# Patient Record
Sex: MALE | Race: WHITE | NOT HISPANIC OR LATINO | Employment: OTHER | URBAN - METROPOLITAN AREA
[De-identification: names, ages, dates, MRNs, and addresses within clinical notes are randomized per-mention and may not be internally consistent; named-entity substitution may affect disease eponyms.]

---

## 2018-12-20 ENCOUNTER — TRANSCRIBE ORDERS (OUTPATIENT)
Dept: ADMINISTRATIVE | Facility: HOSPITAL | Age: 68
End: 2018-12-20

## 2018-12-20 DIAGNOSIS — R52 PAIN: Primary | ICD-10-CM

## 2019-01-08 ENCOUNTER — HOSPITAL ENCOUNTER (OUTPATIENT)
Dept: MRI IMAGING | Facility: HOSPITAL | Age: 69
Discharge: HOME | End: 2019-01-08
Attending: FAMILY MEDICINE
Payer: MEDICARE

## 2019-01-08 DIAGNOSIS — R52 PAIN: ICD-10-CM

## 2019-01-08 PROCEDURE — 73221 MRI JOINT UPR EXTREM W/O DYE: CPT

## 2019-02-04 ENCOUNTER — OFFICE VISIT (OUTPATIENT)
Dept: OBGYN CLINIC | Facility: HOSPITAL | Age: 69
End: 2019-02-04
Payer: MEDICARE

## 2019-02-04 VITALS
WEIGHT: 271.6 LBS | DIASTOLIC BLOOD PRESSURE: 65 MMHG | HEIGHT: 69 IN | BODY MASS INDEX: 40.23 KG/M2 | SYSTOLIC BLOOD PRESSURE: 96 MMHG | HEART RATE: 60 BPM

## 2019-02-04 DIAGNOSIS — S46.811A INFRASPINATUS TENDON TEAR, RIGHT, INITIAL ENCOUNTER: ICD-10-CM

## 2019-02-04 DIAGNOSIS — IMO0001 SUPRASPINATUS TENDON TEAR, RIGHT, INITIAL ENCOUNTER: Primary | ICD-10-CM

## 2019-02-04 PROCEDURE — 99203 OFFICE O/P NEW LOW 30 MIN: CPT | Performed by: ORTHOPAEDIC SURGERY

## 2019-02-04 RX ORDER — METOPROLOL TARTRATE 100 MG/1
100 TABLET ORAL EVERY 12 HOURS SCHEDULED
COMMUNITY

## 2019-02-04 RX ORDER — ATORVASTATIN CALCIUM 80 MG/1
80 TABLET, FILM COATED ORAL
COMMUNITY

## 2019-02-04 RX ORDER — ISOSORBIDE DINITRATE 20 MG/1
20 TABLET ORAL 3 TIMES DAILY
COMMUNITY

## 2019-02-04 RX ORDER — CHLORTHALIDONE 25 MG/1
25 TABLET ORAL
COMMUNITY

## 2019-02-04 RX ORDER — CLOPIDOGREL BISULFATE 75 MG/1
75 TABLET ORAL
COMMUNITY
End: 2020-09-21

## 2019-02-04 RX ORDER — MELATONIN
2000 DAILY
COMMUNITY

## 2019-02-04 RX ORDER — LISINOPRIL 40 MG/1
40 TABLET ORAL
COMMUNITY

## 2019-02-04 RX ORDER — GABAPENTIN 400 MG/1
400 CAPSULE ORAL 3 TIMES DAILY
COMMUNITY

## 2019-02-04 RX ORDER — CEFAZOLIN SODIUM 2 G/50ML
2000 SOLUTION INTRAVENOUS ONCE
Status: CANCELLED | OUTPATIENT
Start: 2019-02-04 | End: 2019-02-04

## 2019-02-04 RX ORDER — CLONAZEPAM 0.5 MG/1
0.5 TABLET ORAL 2 TIMES DAILY
COMMUNITY

## 2019-02-04 RX ORDER — ASPIRIN 325 MG
325 TABLET ORAL
COMMUNITY

## 2019-02-04 NOTE — PATIENT INSTRUCTIONS
You are being scheduled for a shoulder arthroscopy to treat your symptoms  Below are some instructions and information on what to expect before and after your surgery  Pre-Surgical Preparation for Arthroscopic Shoulder Surgery: You will be contacted the evening prior to your surgery to confirm the scheduled time of the procedure and when to arrive at the hospital    Do not eat or drink anything after midnight the night before your surgery  Since you are having out-patient surgery, make sure that you have someone who can drive you home later in the day  Also, prepare that person for a long day, as the process of safely preparing for and recovering from the procedure is more time consuming than the actual procedure! As you will be in a sling after surgery, please wear or bring a loose fitting button-down shirt so that you can easily place this over the sling when you leave the surgical suite  This avoids having to place the operative arm in a sleeve  Most patients find that this is the easiest outfit to wear for the first week or so after surgery so you may want to plan accordingly  Most patients find that lying down in bed after shoulder surgery accentuates their discomfort  This is likely related to the effect of gravity on the swelling in the shoulder  As a result, most patients sleep better in a recliner or in bed with pillows propped up behind their back for the first few days or weeks after surgery  It is a good idea to plan for this ahead of time so there will be less hassle getting things set up the night after surgery  What to Expect After Arthroscopic Shoulder Surgery: It is normal to have swelling and discomfort in the shoulder for several days or a week after surgery  It is also normal to have a small amount of drainage from the surgical wounds (especially the first few days after surgery), as we put fluid into the shoulder to visualize the structures during surgery  It is NOT normal to have foul smelling, purulent drainage and if this is noted, please contact the office immediately or proceed to the emergency room for evaluation as this may indicate an infection  Applying ice bags to the shoulder may help with pain that is not controlled by the pain medicine  Ice should be applied 20-30 minutes at a time, every hour or two  Make sure to put a thin towel or T-shirt next to your skin to avoid direct contact of the ice with the skin  Icing is most helpful in the first 48 hours, although many people find that continuing past this time frame lessens their postoperative pain  Please note that your post-operative dressing is not conductive to ice, so if you need to, it is okay to remove that dressing even the night after surgery and place band-aids over the wounds in order for the ice to take effect  Pain Control    Most patients will receive a nerve block, the local anesthetic may keep your whole arm numb for several hours (12-24 in most cases)  When the block is beginning to wear off, you will first feel a pins and needles sensation in your hand  This is a warning that you may start experiencing more pain, so please take a pain pill if you have not already  You will be given a prescription for pain medication when you are discharged from the hospital  If you find you do not tolerate that type of pain medicine well, call our office and we will try another one  The anesthesiologists have also been providing most patients with a catheter that is left in place after the block  The catheter is connected to a small pump which will continue to provide numbing medicine and help prolong the pain control from the block  Unfortunately this catheter is not as effective as the initial block, but can still be very helpful in managing the pain  Even with the block the pain can be significant and a narcotic pain medication is often required to manage the pain    A prescription for this will be provided but only a limited number of pills will be prescribed to help manage the acute phase of recovery  Outside of the acute phase (5 or so days), this medication will not be indicated  In addition to the narcotic pain medication, it is safe to use an anti-inflammatory (unless the patient has a medical condition that would not allow safe use of this mediation)  This includes the Advil, Motrin, Ibuprofen and Alleve category of medications  Simply follow the over the counter dosing on the package and use as indicated as another adjunct  Importantly since these medications are all very similar, use only one of them  Tylenol is a separate medication that can be utilized as well and can be taken at the same time as the other medication or given in a "staggered" manner  Just make sure that you follow the dosing on the over the counter bottle instructions  Also make sure that the pain medication prescribed by Dr Olga Torres team does not contain acetaminophen (this is found in Percocet and Vicodin)  Typically we do not prescribe those types of pain medications but if for some reason that has been prescribed DO NOT add more Tylenol (acetaminophen) as you could end up taking too much of that medication  As mentioned above, most patients find that lying down accentuates their discomfort  You might sleep better in a recliner, or propped up in bed  Dr Millie Payne encourages patients to safely ambulate around the house as much as possible in the first few days after the procedure as this can help with blood circulation in the legs  While the incidence of blood clots is very rare following shoulder surgery, early ambulation is a great way to help decrease the already low rate  24-48 hours after the surgery you may remove the bandage and cover incisions with Band-Aids if needed   At that time you may shower, the wounds will have a surgical glue that will protect them from shower water but do not submerge your incisions directly (bathing or swimming) until at least 2 weeks post-operatively  Unless noted otherwise in your discharge paperwork, Dr Frieda Kerr uses absorbable sutures so they do not need to be removed  Dr Edith Pyle physician assistant (PA) will see you in the office a few days after the procedure to review the intra-operative findings and to initiate physical therapy if appropriate  A post-operative appointment should have been scheduled for you already, but if for some reason this did not happen, please call the office to make one  Physical therapy is important after nearly all shoulder surgeries and a detailed rehabilitation plan based on the specific intra-operative findings and procedures will be provided to your therapist at the first post-operative office visit  Most patients have post-operative therapy appointments scheduled pre-operatively, but if you do not, that will be handled at the first post-operative office visit  Unless expressly directed otherwise it is safe to remove the sling even the first day after the surgery and let the arm hang by the side  This allows patients to shower and even put a shirt on (bad arm in the sleeve first)  It is also safe to flex and extend their wrist, hand and fingers as much as possible when the block wears off  These simple motions can serve to pump fluid out of the forearm and decrease swelling in the arm

## 2019-02-04 NOTE — PROGRESS NOTES
Assessment  Diagnoses and all orders for this visit:    Supraspinatus tendon tear, right, initial encounter      Infraspinatus tendon tear, right, initial encounter      Discussion and Plan:    The patient has a recurrent tear of his supraspinatus and infraspinatus with some retraction but only mild atrophy  He has not improved with the injection and the physical therapy that he has been doing on his own at home after being instructed by the therapist of how to do it, given these findings I feel he is indicated for arthroscopic evaluation and revision rotator cuff repair  He understands with mild atrophy there is no guarantee that I can fully repair the recurrent tear and there is no guarantee that it will fully heal but it is his next best option at this time and we would not progress towards reverse total shoulder unless we are unsuccessful at repairing his rotator cuff  A thorough discussion was performed with the patient regarding the risks and benefit of operative and nonoperative treatment of their rotator cuff tear  Risks discussed include but were not limited to infection, neurovascular injury, recurrent tear, nonhealing of the repair, need for further surgery, need for biceps tenodesis or tenotomy, stiffness, need for prolonged rehabilitation, as well as the risk of anesthesia  After this discussion all questions were answered and informed consent was obtained in the office for arthroscopic rotator cuff repair  Subjective:   Patient ID: Eve Hansen is a 76 y o  male      Layton Copping presents for a second opinion for his right shoulder  He had an open rotator cuff repair approximately 20 years ago due to a fall at work  He states the right shoulder has been functioning well until October 2018 shortly after returning home from a fishing trip  He denies known injury or trauma  When shoulder pain and motion did not improve, he sought treatment at [facility]    MRI was obtained and was told he was not a surgical candidate  He states he did not see a surgeon but saw an advance practitioner  He was given a steroid injection which did not help his pain  He was given a home exercise program for which he has been faithfully doing since he first saw [facility] at the end of last year  Bhavana Duncan continues to be limited with shoulder range of motion and with pain  He has pain with sleeping  He is limited with activities of daily living and activities of enjoyment  He denies numbness or tingling  Vitals:    02/04/19 1320   BP: 96/65   Pulse: 60     The following portions of the patient's history were reviewed and updated as appropriate: allergies, current medications, past family history, past medical history, past social history, past surgical history and problem list     Review of Systems   Constitutional: Negative for chills and fever  HENT: Negative for hearing loss  Eyes: Negative for visual disturbance  Respiratory: Negative for shortness of breath  Cardiovascular: Negative for chest pain  Gastrointestinal: Negative for abdominal pain  Musculoskeletal:        As reviewed in the HPI   Skin: Negative for rash  Neurological:        As reviewed in the HPI   Psychiatric/Behavioral: Negative for agitation  Objective:  Left Shoulder Exam   Left shoulder exam is normal     Right Shoulder Exam     Tenderness   None    Range of Motion   Passive Abduction:                    90  Forward Flexion:                        170  External Rotation:                      30  Internal Rotation 0 degrees:      Sacrum    Muscle Strength   External Rotation: 3/5  Supraspinatus:     4/5    Tests   Impingement:   Positive  Ortez:          Positive  Drop Arm:        Positive    Comments:  Positive Empty Can  Negative Belly press  Negative Bear Hug          Physical Exam   Constitutional: He is oriented to person, place, and time  He appears well-developed and well-nourished     HENT:   Head: Normocephalic and atraumatic  Eyes: EOM are normal    Neck: Normal range of motion  Neck supple  Cardiovascular: Normal rate, regular rhythm and normal heart sounds  Pulmonary/Chest: Effort normal and breath sounds normal  No respiratory distress  He has no wheezes  Abdominal: Soft  He exhibits no distension  Musculoskeletal:        Left shoulder: Normal    Neurological: He is alert and oriented to person, place, and time  Skin: Skin is warm and dry  Psychiatric: He has a normal mood and affect  His behavior is normal  Judgment and thought content normal    Nursing note and vitals reviewed  I have personally reviewed pertinent films in PACS and my interpretation is as follows  MRI right shoulder: complete supraspinatus and infraspinatus tendon tears with mild atrophy  Long head bicep tendon tear

## 2019-02-05 ENCOUNTER — TELEPHONE (OUTPATIENT)
Dept: OBGYN CLINIC | Facility: HOSPITAL | Age: 69
End: 2019-02-05

## 2019-04-25 ENCOUNTER — CLINICAL SUPPORT (OUTPATIENT)
Dept: CARDIAC REHAB | Age: 69
End: 2019-04-25
Payer: OTHER GOVERNMENT

## 2019-04-25 DIAGNOSIS — Z95.1 S/P CABG X 3: ICD-10-CM

## 2019-04-25 PROCEDURE — 93797 PHYS/QHP OP CAR RHAB WO ECG: CPT

## 2019-04-29 ENCOUNTER — CLINICAL SUPPORT (OUTPATIENT)
Dept: CARDIAC REHAB | Age: 69
End: 2019-04-29
Payer: OTHER GOVERNMENT

## 2019-04-29 DIAGNOSIS — Z95.1 S/P CABG (CORONARY ARTERY BYPASS GRAFT): ICD-10-CM

## 2019-04-29 PROCEDURE — 93798 PHYS/QHP OP CAR RHAB W/ECG: CPT

## 2019-05-01 ENCOUNTER — CLINICAL SUPPORT (OUTPATIENT)
Dept: CARDIAC REHAB | Age: 69
End: 2019-05-01
Payer: OTHER GOVERNMENT

## 2019-05-01 DIAGNOSIS — Z95.1 S/P CABG X 3: ICD-10-CM

## 2019-05-01 PROCEDURE — 93798 PHYS/QHP OP CAR RHAB W/ECG: CPT

## 2019-05-03 ENCOUNTER — CLINICAL SUPPORT (OUTPATIENT)
Dept: CARDIAC REHAB | Age: 69
End: 2019-05-03
Payer: OTHER GOVERNMENT

## 2019-05-03 DIAGNOSIS — Z95.1 S/P CABG X 3: ICD-10-CM

## 2019-05-03 PROCEDURE — 93798 PHYS/QHP OP CAR RHAB W/ECG: CPT

## 2019-05-06 ENCOUNTER — CLINICAL SUPPORT (OUTPATIENT)
Dept: CARDIAC REHAB | Age: 69
End: 2019-05-06
Payer: OTHER GOVERNMENT

## 2019-05-06 DIAGNOSIS — Z95.1 S/P CABG X 3: ICD-10-CM

## 2019-05-06 PROCEDURE — 93798 PHYS/QHP OP CAR RHAB W/ECG: CPT

## 2019-05-08 ENCOUNTER — CLINICAL SUPPORT (OUTPATIENT)
Dept: CARDIAC REHAB | Age: 69
End: 2019-05-08
Payer: OTHER GOVERNMENT

## 2019-05-08 DIAGNOSIS — Z95.1 S/P CABG X 3: ICD-10-CM

## 2019-05-08 PROCEDURE — 93798 PHYS/QHP OP CAR RHAB W/ECG: CPT

## 2019-05-10 ENCOUNTER — CLINICAL SUPPORT (OUTPATIENT)
Dept: CARDIAC REHAB | Age: 69
End: 2019-05-10
Payer: OTHER GOVERNMENT

## 2019-05-10 DIAGNOSIS — Z95.1 S/P CABG X 3: ICD-10-CM

## 2019-05-10 PROCEDURE — 93798 PHYS/QHP OP CAR RHAB W/ECG: CPT

## 2019-05-13 ENCOUNTER — CLINICAL SUPPORT (OUTPATIENT)
Dept: CARDIAC REHAB | Age: 69
End: 2019-05-13
Payer: OTHER GOVERNMENT

## 2019-05-13 DIAGNOSIS — Z95.1 S/P CABG X 3: ICD-10-CM

## 2019-05-13 PROCEDURE — 93798 PHYS/QHP OP CAR RHAB W/ECG: CPT

## 2019-05-15 ENCOUNTER — CLINICAL SUPPORT (OUTPATIENT)
Dept: CARDIAC REHAB | Age: 69
End: 2019-05-15
Payer: OTHER GOVERNMENT

## 2019-05-15 DIAGNOSIS — Z95.1 S/P CABG X 3: ICD-10-CM

## 2019-05-15 PROCEDURE — 93798 PHYS/QHP OP CAR RHAB W/ECG: CPT

## 2019-05-17 ENCOUNTER — CLINICAL SUPPORT (OUTPATIENT)
Dept: CARDIAC REHAB | Age: 69
End: 2019-05-17
Payer: OTHER GOVERNMENT

## 2019-05-17 DIAGNOSIS — Z95.1 S/P CABG X 3: ICD-10-CM

## 2019-05-17 PROCEDURE — 93798 PHYS/QHP OP CAR RHAB W/ECG: CPT

## 2019-05-20 ENCOUNTER — CLINICAL SUPPORT (OUTPATIENT)
Dept: CARDIAC REHAB | Age: 69
End: 2019-05-20
Payer: OTHER GOVERNMENT

## 2019-05-20 DIAGNOSIS — Z95.1 S/P CABG X 3: ICD-10-CM

## 2019-05-20 PROCEDURE — 93798 PHYS/QHP OP CAR RHAB W/ECG: CPT

## 2019-05-22 ENCOUNTER — CLINICAL SUPPORT (OUTPATIENT)
Dept: CARDIAC REHAB | Age: 69
End: 2019-05-22
Payer: OTHER GOVERNMENT

## 2019-05-22 DIAGNOSIS — Z95.1 S/P CABG X 3: ICD-10-CM

## 2019-05-22 PROCEDURE — 93798 PHYS/QHP OP CAR RHAB W/ECG: CPT

## 2019-05-24 ENCOUNTER — CLINICAL SUPPORT (OUTPATIENT)
Dept: CARDIAC REHAB | Age: 69
End: 2019-05-24
Payer: OTHER GOVERNMENT

## 2019-05-24 DIAGNOSIS — Z95.1 S/P CABG X 3: ICD-10-CM

## 2019-05-24 PROCEDURE — 93798 PHYS/QHP OP CAR RHAB W/ECG: CPT

## 2019-05-28 ENCOUNTER — CLINICAL SUPPORT (OUTPATIENT)
Dept: CARDIAC REHAB | Age: 69
End: 2019-05-28
Payer: OTHER GOVERNMENT

## 2019-05-28 DIAGNOSIS — Z95.1 S/P CABG X 3: ICD-10-CM

## 2019-05-28 PROCEDURE — 93798 PHYS/QHP OP CAR RHAB W/ECG: CPT

## 2019-05-29 ENCOUNTER — CLINICAL SUPPORT (OUTPATIENT)
Dept: CARDIAC REHAB | Age: 69
End: 2019-05-29
Payer: OTHER GOVERNMENT

## 2019-05-29 DIAGNOSIS — Z95.1 S/P CABG X 3: ICD-10-CM

## 2019-05-29 PROCEDURE — 93798 PHYS/QHP OP CAR RHAB W/ECG: CPT

## 2019-05-31 ENCOUNTER — CLINICAL SUPPORT (OUTPATIENT)
Dept: CARDIAC REHAB | Age: 69
End: 2019-05-31
Payer: OTHER GOVERNMENT

## 2019-05-31 DIAGNOSIS — Z95.1 S/P CABG X 3: ICD-10-CM

## 2019-05-31 PROCEDURE — 93798 PHYS/QHP OP CAR RHAB W/ECG: CPT

## 2019-06-03 ENCOUNTER — CLINICAL SUPPORT (OUTPATIENT)
Dept: CARDIAC REHAB | Age: 69
End: 2019-06-03
Payer: OTHER GOVERNMENT

## 2019-06-03 DIAGNOSIS — Z95.1 S/P CABG X 3: ICD-10-CM

## 2019-06-03 PROCEDURE — 93798 PHYS/QHP OP CAR RHAB W/ECG: CPT

## 2019-06-05 ENCOUNTER — CLINICAL SUPPORT (OUTPATIENT)
Dept: CARDIAC REHAB | Age: 69
End: 2019-06-05
Payer: OTHER GOVERNMENT

## 2019-06-05 DIAGNOSIS — Z95.1 S/P CABG X 3: ICD-10-CM

## 2019-06-05 PROCEDURE — 93798 PHYS/QHP OP CAR RHAB W/ECG: CPT

## 2019-06-07 ENCOUNTER — CLINICAL SUPPORT (OUTPATIENT)
Dept: CARDIAC REHAB | Age: 69
End: 2019-06-07
Payer: OTHER GOVERNMENT

## 2019-06-07 DIAGNOSIS — Z95.1 S/P CABG X 3: ICD-10-CM

## 2019-06-07 PROCEDURE — 93798 PHYS/QHP OP CAR RHAB W/ECG: CPT

## 2019-06-10 ENCOUNTER — CLINICAL SUPPORT (OUTPATIENT)
Dept: CARDIAC REHAB | Age: 69
End: 2019-06-10
Payer: OTHER GOVERNMENT

## 2019-06-10 DIAGNOSIS — Z95.1 S/P CABG X 3: ICD-10-CM

## 2019-06-10 PROCEDURE — 93798 PHYS/QHP OP CAR RHAB W/ECG: CPT

## 2019-06-12 ENCOUNTER — CLINICAL SUPPORT (OUTPATIENT)
Dept: CARDIAC REHAB | Age: 69
End: 2019-06-12
Payer: OTHER GOVERNMENT

## 2019-06-12 DIAGNOSIS — Z95.1 S/P CABG X 3: ICD-10-CM

## 2019-06-12 PROCEDURE — 93798 PHYS/QHP OP CAR RHAB W/ECG: CPT

## 2019-06-14 ENCOUNTER — CLINICAL SUPPORT (OUTPATIENT)
Dept: CARDIAC REHAB | Age: 69
End: 2019-06-14
Payer: OTHER GOVERNMENT

## 2019-06-14 DIAGNOSIS — Z95.1 S/P CABG X 3: ICD-10-CM

## 2019-06-14 PROCEDURE — 93798 PHYS/QHP OP CAR RHAB W/ECG: CPT

## 2019-06-17 ENCOUNTER — CLINICAL SUPPORT (OUTPATIENT)
Dept: CARDIAC REHAB | Age: 69
End: 2019-06-17
Payer: OTHER GOVERNMENT

## 2019-06-17 DIAGNOSIS — Z95.1 S/P CABG X 3: ICD-10-CM

## 2019-06-17 PROCEDURE — 93798 PHYS/QHP OP CAR RHAB W/ECG: CPT

## 2019-06-19 ENCOUNTER — CLINICAL SUPPORT (OUTPATIENT)
Dept: CARDIAC REHAB | Age: 69
End: 2019-06-19
Payer: OTHER GOVERNMENT

## 2019-06-19 DIAGNOSIS — Z95.1 S/P CABG X 3: ICD-10-CM

## 2019-06-19 PROCEDURE — 93798 PHYS/QHP OP CAR RHAB W/ECG: CPT

## 2019-06-21 ENCOUNTER — CLINICAL SUPPORT (OUTPATIENT)
Dept: CARDIAC REHAB | Age: 69
End: 2019-06-21
Payer: OTHER GOVERNMENT

## 2019-06-21 DIAGNOSIS — Z95.1 S/P CABG X 3: ICD-10-CM

## 2019-06-21 PROCEDURE — 93798 PHYS/QHP OP CAR RHAB W/ECG: CPT

## 2019-06-24 ENCOUNTER — CLINICAL SUPPORT (OUTPATIENT)
Dept: CARDIAC REHAB | Age: 69
End: 2019-06-24
Payer: OTHER GOVERNMENT

## 2019-06-24 DIAGNOSIS — Z95.1 S/P CABG X 3: ICD-10-CM

## 2019-06-24 PROCEDURE — 93798 PHYS/QHP OP CAR RHAB W/ECG: CPT

## 2019-06-26 ENCOUNTER — APPOINTMENT (OUTPATIENT)
Dept: CARDIAC REHAB | Age: 69
End: 2019-06-26
Payer: OTHER GOVERNMENT

## 2019-06-28 ENCOUNTER — APPOINTMENT (OUTPATIENT)
Dept: CARDIAC REHAB | Age: 69
End: 2019-06-28
Payer: OTHER GOVERNMENT

## 2019-07-03 ENCOUNTER — CLINICAL SUPPORT (OUTPATIENT)
Dept: CARDIAC REHAB | Age: 69
End: 2019-07-03
Payer: MEDICARE

## 2019-07-03 DIAGNOSIS — Z95.1 S/P CABG X 3: ICD-10-CM

## 2019-07-03 PROCEDURE — 93798 PHYS/QHP OP CAR RHAB W/ECG: CPT

## 2019-07-05 ENCOUNTER — CLINICAL SUPPORT (OUTPATIENT)
Dept: CARDIAC REHAB | Age: 69
End: 2019-07-05
Payer: MEDICARE

## 2019-07-05 DIAGNOSIS — Z95.1 S/P CABG X 3: ICD-10-CM

## 2019-07-05 PROCEDURE — 93798 PHYS/QHP OP CAR RHAB W/ECG: CPT

## 2019-07-08 ENCOUNTER — CLINICAL SUPPORT (OUTPATIENT)
Dept: CARDIAC REHAB | Age: 69
End: 2019-07-08
Payer: MEDICARE

## 2019-07-08 DIAGNOSIS — Z95.1 S/P CABG X 3: ICD-10-CM

## 2019-07-08 PROCEDURE — 93798 PHYS/QHP OP CAR RHAB W/ECG: CPT

## 2019-07-10 ENCOUNTER — CLINICAL SUPPORT (OUTPATIENT)
Dept: CARDIAC REHAB | Age: 69
End: 2019-07-10
Payer: MEDICARE

## 2019-07-10 DIAGNOSIS — Z95.1 S/P CABG X 3: ICD-10-CM

## 2019-07-10 PROCEDURE — 93798 PHYS/QHP OP CAR RHAB W/ECG: CPT

## 2019-07-12 ENCOUNTER — CLINICAL SUPPORT (OUTPATIENT)
Dept: CARDIAC REHAB | Age: 69
End: 2019-07-12
Payer: MEDICARE

## 2019-07-12 DIAGNOSIS — Z95.1 S/P CABG X 3: ICD-10-CM

## 2019-07-12 PROCEDURE — 93798 PHYS/QHP OP CAR RHAB W/ECG: CPT

## 2019-07-15 ENCOUNTER — CLINICAL SUPPORT (OUTPATIENT)
Dept: CARDIAC REHAB | Age: 69
End: 2019-07-15
Payer: MEDICARE

## 2019-07-15 DIAGNOSIS — Z95.1 S/P CABG X 3: ICD-10-CM

## 2019-07-15 PROCEDURE — 93798 PHYS/QHP OP CAR RHAB W/ECG: CPT

## 2019-07-17 ENCOUNTER — CLINICAL SUPPORT (OUTPATIENT)
Dept: CARDIAC REHAB | Age: 69
End: 2019-07-17
Payer: MEDICARE

## 2019-07-17 DIAGNOSIS — Z95.1 S/P CABG X 3: ICD-10-CM

## 2019-07-17 PROCEDURE — 93798 PHYS/QHP OP CAR RHAB W/ECG: CPT

## 2019-07-19 ENCOUNTER — CLINICAL SUPPORT (OUTPATIENT)
Dept: CARDIAC REHAB | Age: 69
End: 2019-07-19
Payer: MEDICARE

## 2019-07-19 DIAGNOSIS — Z95.1 S/P CABG X 3: ICD-10-CM

## 2019-07-19 PROCEDURE — 93798 PHYS/QHP OP CAR RHAB W/ECG: CPT

## 2019-07-24 ENCOUNTER — CLINICAL SUPPORT (OUTPATIENT)
Dept: CARDIAC REHAB | Age: 69
End: 2019-07-24
Payer: MEDICARE

## 2019-07-24 DIAGNOSIS — Z95.1 S/P CABG X 3: ICD-10-CM

## 2019-07-24 PROCEDURE — 93798 PHYS/QHP OP CAR RHAB W/ECG: CPT

## 2019-07-26 ENCOUNTER — CLINICAL SUPPORT (OUTPATIENT)
Dept: CARDIAC REHAB | Age: 69
End: 2019-07-26
Payer: MEDICARE

## 2019-07-26 DIAGNOSIS — Z95.1 S/P CABG X 3: ICD-10-CM

## 2019-07-26 PROCEDURE — 93798 PHYS/QHP OP CAR RHAB W/ECG: CPT

## 2019-07-26 NOTE — PROGRESS NOTES
Alex Sharma        Dear Dr Bandar Buitrago,    Emotional well-being and depression is addressed in the Cardiac  Rehab setting  To assess the severity of depression, patients are given the PHQ-9 Depression Questionnaire  This is to inform you that your patient Galen Wetzel scored a 9 which is interpreted as 5-9 = Mild Depression  Please advise if you recommend additional mental health services or initiation of medical therapy  Thank you for your continued support of cardiac rehabilitation       Sincerely,      Abbi Shannon RN

## 2019-07-26 NOTE — PROGRESS NOTES
Cardiac Rehabilitation Plan of Care   Discharge Report      Today's date: 2019   Visits: 36  Patient name: Di Saez      : 1950  Age: 71 y o  MRN: [id number]  Referring Physician: Micki Velázquez MD  Provider: Carter Luis  Clinician: Manuelito Gr RN      Dx:   Encounter Diagnosis   Name Primary?  S/P CABG x 3      Date of onset:       SUMMARY OF PROGRESS:   Alysha Brenner completed session #36 today  During his discharge Submax ETT he reached 5 8 METs at 10 mins  NSR with occasional PVC  Denies cardiac symptoms  Today's weight was 280 lbs which is a 12 lb increase since 1 week ago  Alysha Brenner reports his furosemide was discontinued on Tuesday by Dr Shaye Maldonado at the  [facility] center was called and informed of his weight gain and they will address this with the patient  He continues with neuropathy pain during exercise, especially with walking/treadmill  Alysha Brenner has made dietary modifications such as increasing fruits and vegetables, rarely eating red meat, and whole wheat bread and pasta  His Rate Your Plate score increased by 9 4%  He reports his FBS ranges 100-121  PHQ-9 repeated today was 9 (Mild depression), which is down 1 level  Reports he doesn't feel depressed, but feels very frustrated with his health issues  He has good family support  He rides a stationery bike 20-30 mins 5 days a week and will be joining a gym         Medication compliance: Yes   Comments:   Fall Risk: Low   Comments:     EKG changes: NSR, occ PVC      EXERCISE ASSESSMENT and PLAN    Current Exercise Program in Rehab:       Frequency: 3 days/week   Home: stationery bike 5 days/week: 20-30 mins     Minutes: 40         METS: 3 8-5 3          HR:    RPE: 4-5         Modalities: Treadmill, UBE, NuStep and Recumbent bike      Exercise Progression 30 Day Goals :    Frequency: 3 days/week  Plus home walking, will be joining a gym   Minutes: 40-50   METS: 2 8-4 0   HR:     RPE: 4-5   Modalities: Treadmill, UBE, Rower, NuStep and Recumbent bike    Strength trainin-3 days / week, 12-15 repitations  and 1-2 sets per modality    Modalities: Leg Press, Chest Press and Pull Downs    Progressing: yes - progressed duration to 40 mins  Progressing intensity levels as tolerated without difficulty    Home Exercise: Type: stationery bike, Frequency: 5 days/week, Duration: 20-30 mins    Goals: 10% improvement in functional capacity, improved DASI score by 10%, Increase in peak CR METs by 40%, Resume ADLs with increased strength and Exercise 5 days/wk, >150mins/wk, increased strength  Education: Benefit of exercise for CAD risk factors, signs and sxs and RPE scale   Plan:stationery bike 5 days/week for 20-30 mins, joining gym - 3-4 days/week 40-50 mins  Readiness to change: Action      NUTRITION ASSESSMENT AND PLAN    Weight control:    Starting weight: 269 4   Current weight:   280  Waist circumference:    Startin 5   Current:  52 25  Diabetes: Patient reported fasting -120  Lipid management: Discussed diet and lipid management and Last lipid profile 19  Chol 115    HDL 27  LDL 65  Goals:reduced BMI to < 25, HDL >40, TRG <150, decreased body fat % <33%, reduced waist circumference <40 inches, improved A1c  < 6 5%, Improved Rate Your Plate score  >38 and Wt  loss 1-2 ppw goal of 250 lbs  Education: heart healthy eating  low sodium diet  diet and lipid management  diabetes management and exercise  wt  Loss   Education class: Reading Food Labels, Education Class: Heart Healthy Eating  Progressing:  Yes,  improved BG control   No- 12 lb weight gain in 1 week (diuretic dc'd)  Plan:  Goals met:   Increased PUFA and MUFA, Decrease SFA, Increase whole grains, increase fruits/vegs, eliminate processed meats, reduce red meat to rarely, swtiched to low fat dairy and Reduce added sugars <25g/day  Readiness to change: Maintenance      PSYCHOSOCIAL ASSESSMENT AND PLAN    Emotional: PHQ-9 = 9 = mild depression     Patient reports he/she is coping well with good social support and denies depression or anxiety  Self-reported stress level: 1   Social support: Excellent  Goals:  Goals met:   PHQ-9 - reduced severity by one level, Physical Fitness in [facility] Score < 3, Daily Activity in [facility] Score < 3, Pain in [facility] Score < 3, Overall Health in [facility] Score < 3, Quality of Life in [facility] Score < 3 , Change in Health in [place] Score < 3 , improved sleep, improved positive thoughts of well being and increased energy  Education: benefits of positive support system and depression and CAD  Progressing: yes - reports improved fitness, increased daily activity  Plan: Practice relaxation techniques  Readiness to change: Action      OTHER CORE COMPONENTS     Tobacco:   Social History     Tobacco Use   Smoking Status Former Smoker   Smokeless Tobacco Never Used   Tobacco Comment    quit        Tobacco Use Intervention: Referral to tobacco expert:   N/A    Blood pressure:    Restin/70, 102/56   Exercise: 132/72, 160/78    Goals: consistent BP < 130/80, reduced dietary sodium <2300mg and consistent exercise >150 mins/wk  Education:  understanding HTN and CAD and low sodium diet and HTN  Progressing: yes - consistent exercise 150 mins/wk  Plan: medication compliance, low sodium diet, consistent exercise >150 mins/week  Readiness to change: Action

## 2020-08-20 ENCOUNTER — TRANSCRIBE ORDERS (OUTPATIENT)
Dept: ADMINISTRATIVE | Facility: HOSPITAL | Age: 70
End: 2020-08-20

## 2020-08-20 DIAGNOSIS — R52 PAIN: Primary | ICD-10-CM

## 2020-09-08 ENCOUNTER — HOSPITAL ENCOUNTER (OUTPATIENT)
Dept: MRI IMAGING | Facility: HOSPITAL | Age: 70
Discharge: HOME | End: 2020-09-08
Attending: FAMILY MEDICINE
Payer: MEDICARE

## 2020-09-08 DIAGNOSIS — R52 PAIN: ICD-10-CM

## 2020-09-08 PROCEDURE — 72146 MRI CHEST SPINE W/O DYE: CPT

## 2020-09-08 PROCEDURE — G1004 CDSM NDSC: HCPCS

## 2020-09-21 ENCOUNTER — OFFICE VISIT (OUTPATIENT)
Dept: GASTROENTEROLOGY | Facility: CLINIC | Age: 70
End: 2020-09-21
Payer: MEDICARE

## 2020-09-21 VITALS
DIASTOLIC BLOOD PRESSURE: 76 MMHG | SYSTOLIC BLOOD PRESSURE: 160 MMHG | BODY MASS INDEX: 43.07 KG/M2 | HEART RATE: 61 BPM | HEIGHT: 68 IN | WEIGHT: 284.2 LBS | TEMPERATURE: 96.6 F

## 2020-09-21 DIAGNOSIS — Z12.11 SCREEN FOR COLON CANCER: Primary | ICD-10-CM

## 2020-09-21 PROCEDURE — 99203 OFFICE O/P NEW LOW 30 MIN: CPT | Performed by: PHYSICIAN ASSISTANT

## 2020-09-21 RX ORDER — LEVOTHYROXINE SODIUM 88 UG/1
CAPSULE ORAL
COMMUNITY

## 2020-09-21 RX ORDER — EZETIMIBE 10 MG/1
5 TABLET ORAL DAILY
COMMUNITY

## 2020-09-21 RX ORDER — INSULIN GLARGINE 100 [IU]/ML
INJECTION, SOLUTION SUBCUTANEOUS
COMMUNITY

## 2020-09-21 RX ORDER — PREGABALIN 100 MG/1
100 CAPSULE ORAL 3 TIMES DAILY
COMMUNITY

## 2020-09-21 NOTE — PROGRESS NOTES
[facility] - Outpatient Follow-up Note  Laura Golden 79 y o  male MRN: [id number]  Encounter: [id number]          ASSESSMENT AND PLAN:      Diagnoses and all orders for this visit:    1  Screen for colon cancer  -     Colonoscopy; Future  -     polyethylene glycol (GOLYTELY) 4000 mL solution; Take 4,000 mL by mouth once for 1 dose  2  Diabetes Mellitus Type II - A1C 6 7  3  Hypothyroidism - on Synthroid  4  Coronary Artery Disease s/p CABG x3  5  A fib - on Eliquis    Patient is referred for screening colonoscopy, with last procedure >10 years ago  He has multiple medical comorbidities which were reviewed today  He remains under the care of the cardiologist at the [place]  We will contact them to obtain clearance to hold Eliquis for his colonoscopy  Details of colonoscopy including risks, benefits, alternatives to colonoscopy all reviewed  Patient expresses understanding and agreement to colonoscopy  Will follow up as needed, based on procedure results  He will continue to follow with his PCP for routine medical care   ______________________________________________________________________    SUBJECTIVE:  Mr Yara Jaramillo is a 79year old male with hypertension, hyperlipidemia, DMII, CAD s/p CABG, A fib (on Eliquis) and peripheral neuropathy who presents to the GI office to discuss routine screening colonoscopy  He states he had 1 normal colonoscopy approximately 12 years ago  He has no GI complaints at this time, specifically no abdominal pain, nausea/vomiting, heartburn, dysphagia or odynophagia, constipation, diarrhea, melena or hematochezia  He denies a family history of colon cancer  He has DM2 which is managed with insulin and metformin  He states his most recent A1C was 6 7  He had CABG x3 approximately 2 years ago and remains in cardiac rehab  He had been on Plavix previously but is no longer taking this    He is on Eliquis for a diagnosis of A fib and follows with Dr Joanna Lara at the [facility]  He states he has gained some weight in the past 1-2 years and relates this to thyroid dysfunction  He is currently taking Synthroid daily  He denies fevers, chills, night sweats or unintentional weight loss  No cough, chest pain, palpitations, SOB, or STEWARD changes from baseline  He has some swelling in the left distal lower extremity which is chronic since his bypass surgery  REVIEW OF SYSTEMS IS OTHERWISE NEGATIVE  Historical Information   Past Medical History:   Diagnosis Date    Diabetes mellitus ([facility] [address] )     IDDM    Heart disease     Hyperlipidemia     Hypertension     [facility]    Neuropathy     Sleep apnea     NO CPAP     Past Surgical History:   Procedure Laterality Date    BACK SURGERY  2010    benign tumor removed from spine    CORONARY ANGIOPLASTY  2009    SHOULDER SURGERY Right     TONSILLECTOMY       Social History   Social History     Substance and Sexual Activity   Alcohol Use Not Currently    Comment: social     Social History     Substance and Sexual Activity   Drug Use No     Social History     Tobacco Use   Smoking Status Former Smoker   Smokeless Tobacco Never Used   Tobacco Comment    quit 1989     History reviewed  No pertinent family history      Meds/Allergies       Current Outpatient Medications:     apixaban (ELIQUIS) 5 mg    aspirin 325 mg tablet    atorvastatin (LIPITOR) 80 mg tablet    calcium-vitamin D (OSCAL) 250-125 MG-UNIT per tablet    chlorthalidone 25 mg tablet    clonazePAM (KlonoPIN) 0 5 mg tablet    cyanocobalamin 1000 MCG tablet    ezetimibe (ZETIA) 10 mg tablet    insulin aspart (NovoLOG) 100 units/mL injection    insulin glargine (Lantus) 100 units/mL subcutaneous injection    Levothyroxine Sodium 88 MCG CAPS    lisinopril (ZESTRIL) 40 mg tablet    metFORMIN (GLUCOPHAGE) 1000 MG tablet    metoprolol tartrate (LOPRESSOR) 100 mg tablet    pregabalin (LYRICA) 100 mg capsule    cholecalciferol (VITAMIN D3) 1,000 units tablet    gabapentin (NEURONTIN) 400 mg capsule    insulin detemir (LEVEMIR) 100 units/mL subcutaneous injection    insulin regular (HumuLIN R,NovoLIN R) 100 units/mL injection    isosorbide dinitrate (ISORDIL) 20 mg tablet    polyethylene glycol (GOLYTELY) 4000 mL solution    Allergies   Allergen Reactions    Amlodipine Lip Swelling           Objective     Blood pressure 160/76, pulse 61, temperature (!) 96 6 °F (35 9 °C), temperature source Tympanic, height 5' 8" (1 727 m), weight 129 kg (284 lb 3 2 oz)  Body mass index is 43 21 kg/m²  PHYSICAL EXAM:      General Appearance:   Alert, cooperative, no distress   HEENT:   Normocephalic, atraumatic, sclera anicteric   Wearing mask for examination  Neck:  Supple, symmetrical, no lymphadenopathy, trachea midline   Lungs:   Clear to auscultation bilaterally; no rales, rhonchi or wheezing; respirations unlabored    Heart[de-identified]   Regular rate and rhythm; no murmur, rub, or gallop     Abdomen:   Soft/obese, non-tender without rebound or guarding, non-distended; positive bowel sounds; no masses, no organomegaly    Genitalia:   Deferred    Rectal:   Deferred    Extremities:  No cyanosis or clubbing, trace non-pitting left lower extremity edema    Pulses:  2+ and symmetric    Skin:  No jaundice, rashes, or lesions          Erna Ramsey PA-C

## 2020-09-23 ENCOUNTER — TELEPHONE (OUTPATIENT)
Dept: GASTROENTEROLOGY | Facility: CLINIC | Age: 70
End: 2020-09-23

## 2020-09-23 NOTE — TELEPHONE ENCOUNTER
Our mutual patient is scheduled for procedure: Colonoscopy    On: 11/05/20     With: Dr Cris Uribe is taking the following blood thinner:   Eliquis       Can this be stopped ____2__ days prior to the procedure?       Physician Approving clearance: ________________________

## 2020-09-23 NOTE — TELEPHONE ENCOUNTER
Colonoscopy scheduled for 11/05/20 with Dr Grimm [facility] instructions given verbally/mailed to patient

## 2020-11-05 ENCOUNTER — ANESTHESIA (OUTPATIENT)
Dept: GASTROENTEROLOGY | Facility: HOSPITAL | Age: 70
End: 2020-11-05

## 2020-11-05 ENCOUNTER — ANESTHESIA EVENT (OUTPATIENT)
Dept: GASTROENTEROLOGY | Facility: HOSPITAL | Age: 70
End: 2020-11-05

## 2020-11-05 ENCOUNTER — HOSPITAL ENCOUNTER (OUTPATIENT)
Dept: GASTROENTEROLOGY | Facility: HOSPITAL | Age: 70
Setting detail: OUTPATIENT SURGERY
Discharge: HOME | End: 2020-11-05
Attending: INTERNAL MEDICINE
Payer: MEDICARE

## 2020-11-05 VITALS
SYSTOLIC BLOOD PRESSURE: 121 MMHG | DIASTOLIC BLOOD PRESSURE: 59 MMHG | HEART RATE: 68 BPM | OXYGEN SATURATION: 97 % | BODY MASS INDEX: 43.04 KG/M2 | WEIGHT: 284 LBS | RESPIRATION RATE: 17 BRPM | HEIGHT: 68 IN | TEMPERATURE: 97.2 F

## 2020-11-05 VITALS — HEART RATE: 66 BPM

## 2020-11-05 DIAGNOSIS — Z12.11 SCREEN FOR COLON CANCER: ICD-10-CM

## 2020-11-05 PROCEDURE — 45385 COLONOSCOPY W/LESION REMOVAL: CPT | Performed by: INTERNAL MEDICINE

## 2020-11-05 PROCEDURE — 88305 TISSUE EXAM BY PATHOLOGIST: CPT | Performed by: PATHOLOGY

## 2020-11-05 RX ORDER — EPHEDRINE SULFATE 50 MG/ML
INJECTION INTRAVENOUS AS NEEDED
Status: DISCONTINUED | OUTPATIENT
Start: 2020-11-05 | End: 2020-11-05

## 2020-11-05 RX ORDER — LIDOCAINE HYDROCHLORIDE 10 MG/ML
INJECTION, SOLUTION EPIDURAL; INFILTRATION; INTRACAUDAL; PERINEURAL AS NEEDED
Status: DISCONTINUED | OUTPATIENT
Start: 2020-11-05 | End: 2020-11-05

## 2020-11-05 RX ORDER — SODIUM CHLORIDE, SODIUM LACTATE, POTASSIUM CHLORIDE, CALCIUM CHLORIDE 600; 310; 30; 20 MG/100ML; MG/100ML; MG/100ML; MG/100ML
50 INJECTION, SOLUTION INTRAVENOUS CONTINUOUS
Status: CANCELLED | OUTPATIENT
Start: 2020-11-05

## 2020-11-05 RX ORDER — PROPOFOL 10 MG/ML
INJECTION, EMULSION INTRAVENOUS AS NEEDED
Status: DISCONTINUED | OUTPATIENT
Start: 2020-11-05 | End: 2020-11-05

## 2020-11-05 RX ORDER — LIDOCAINE HYDROCHLORIDE 10 MG/ML
0.5 INJECTION, SOLUTION EPIDURAL; INFILTRATION; INTRACAUDAL; PERINEURAL ONCE AS NEEDED
Status: CANCELLED | OUTPATIENT
Start: 2020-11-05

## 2020-11-05 RX ORDER — SODIUM CHLORIDE 9 MG/ML
INJECTION, SOLUTION INTRAVENOUS CONTINUOUS PRN
Status: DISCONTINUED | OUTPATIENT
Start: 2020-11-05 | End: 2020-11-05

## 2020-11-05 RX ADMIN — SODIUM CHLORIDE: 9 INJECTION, SOLUTION INTRAVENOUS at 10:56

## 2020-11-05 RX ADMIN — PROPOFOL 50 MG: 10 INJECTION, EMULSION INTRAVENOUS at 11:21

## 2020-11-05 RX ADMIN — PROPOFOL 50 MG: 10 INJECTION, EMULSION INTRAVENOUS at 11:31

## 2020-11-05 RX ADMIN — EPHEDRINE SULFATE 10 MG: 50 INJECTION, SOLUTION INTRAVENOUS at 11:25

## 2020-11-05 RX ADMIN — LIDOCAINE HYDROCHLORIDE 50 MG: 10 INJECTION, SOLUTION EPIDURAL; INFILTRATION; INTRACAUDAL; PERINEURAL at 11:11

## 2020-11-05 RX ADMIN — PROPOFOL 50 MG: 10 INJECTION, EMULSION INTRAVENOUS at 11:38

## 2020-11-05 RX ADMIN — PROPOFOL 10 MG: 10 INJECTION, EMULSION INTRAVENOUS at 11:41

## 2020-11-05 RX ADMIN — EPHEDRINE SULFATE 5 MG: 50 INJECTION, SOLUTION INTRAVENOUS at 11:21

## 2020-11-05 RX ADMIN — PROPOFOL 50 MG: 10 INJECTION, EMULSION INTRAVENOUS at 11:15

## 2020-11-05 RX ADMIN — PROPOFOL 100 MG: 10 INJECTION, EMULSION INTRAVENOUS at 11:11

## 2022-09-28 ENCOUNTER — HOSPITAL ENCOUNTER (INPATIENT)
Facility: HOSPITAL | Age: 72
LOS: 4 days | DRG: 303 | End: 2022-10-02
Attending: EMERGENCY MEDICINE | Admitting: INTERNAL MEDICINE
Payer: COMMERCIAL

## 2022-09-28 ENCOUNTER — APPOINTMENT (OUTPATIENT)
Dept: RADIOLOGY | Facility: HOSPITAL | Age: 72
DRG: 303 | End: 2022-09-28
Payer: COMMERCIAL

## 2022-09-28 DIAGNOSIS — I21.4 NSTEMI (NON-ST ELEVATED MYOCARDIAL INFARCTION) (HCC): Primary | ICD-10-CM

## 2022-09-28 DIAGNOSIS — Z95.1 S/P TRIPLE VESSEL BYPASS: ICD-10-CM

## 2022-09-28 DIAGNOSIS — R77.8 ELEVATED TROPONIN: ICD-10-CM

## 2022-09-28 DIAGNOSIS — R07.9 CHEST PAIN: ICD-10-CM

## 2022-09-28 PROBLEM — E03.9 HYPOTHYROIDISM: Status: ACTIVE | Noted: 2022-09-28

## 2022-09-28 PROBLEM — D72.829 LEUKOCYTOSIS: Status: ACTIVE | Noted: 2022-09-28

## 2022-09-28 PROBLEM — E11.9 TYPE 2 DIABETES MELLITUS (HCC): Status: ACTIVE | Noted: 2022-09-28

## 2022-09-28 LAB
2HR DELTA HS TROPONIN: 45 NG/L
4HR DELTA HS TROPONIN: 114 NG/L
ALBUMIN SERPL BCP-MCNC: 3.6 G/DL (ref 3.5–5)
ALP SERPL-CCNC: 98 U/L (ref 34–104)
ALT SERPL W P-5'-P-CCNC: 17 U/L (ref 7–52)
ANION GAP SERPL CALCULATED.3IONS-SCNC: 6 MMOL/L (ref 4–13)
APTT PPP: 44 SECONDS (ref 23–37)
AST SERPL W P-5'-P-CCNC: 16 U/L (ref 13–39)
BASOPHILS # BLD AUTO: 0.05 THOUSANDS/ΜL (ref 0–0.1)
BASOPHILS NFR BLD AUTO: 0 % (ref 0–1)
BILIRUB SERPL-MCNC: 0.93 MG/DL (ref 0.2–1)
BUN SERPL-MCNC: 23 MG/DL (ref 5–25)
CALCIUM SERPL-MCNC: 9.6 MG/DL (ref 8.4–10.2)
CARDIAC TROPONIN I PNL SERPL HS: 181 NG/L
CARDIAC TROPONIN I PNL SERPL HS: 226 NG/L
CARDIAC TROPONIN I PNL SERPL HS: 295 NG/L
CHLORIDE SERPL-SCNC: 104 MMOL/L (ref 96–108)
CO2 SERPL-SCNC: 27 MMOL/L (ref 21–32)
CREAT SERPL-MCNC: 1.13 MG/DL (ref 0.6–1.3)
EOSINOPHIL # BLD AUTO: 0.19 THOUSAND/ΜL (ref 0–0.61)
EOSINOPHIL NFR BLD AUTO: 1 % (ref 0–6)
ERYTHROCYTE [DISTWIDTH] IN BLOOD BY AUTOMATED COUNT: 18.5 % (ref 11.6–15.1)
GFR SERPL CREATININE-BSD FRML MDRD: 64 ML/MIN/1.73SQ M
GLUCOSE SERPL-MCNC: 127 MG/DL (ref 65–140)
GLUCOSE SERPL-MCNC: 67 MG/DL (ref 65–140)
GLUCOSE SERPL-MCNC: 79 MG/DL (ref 65–140)
GLUCOSE SERPL-MCNC: 90 MG/DL (ref 65–140)
HCT VFR BLD AUTO: 43.5 % (ref 36.5–49.3)
HGB BLD-MCNC: 13.8 G/DL (ref 12–17)
IMM GRANULOCYTES # BLD AUTO: 0.06 THOUSAND/UL (ref 0–0.2)
IMM GRANULOCYTES NFR BLD AUTO: 0 % (ref 0–2)
INR PPP: 1.39 (ref 0.84–1.19)
LYMPHOCYTES # BLD AUTO: 2.85 THOUSANDS/ΜL (ref 0.6–4.47)
LYMPHOCYTES NFR BLD AUTO: 19 % (ref 14–44)
MCH RBC QN AUTO: 26.6 PG (ref 26.8–34.3)
MCHC RBC AUTO-ENTMCNC: 31.7 G/DL (ref 31.4–37.4)
MCV RBC AUTO: 84 FL (ref 82–98)
MONOCYTES # BLD AUTO: 1.34 THOUSAND/ΜL (ref 0.17–1.22)
MONOCYTES NFR BLD AUTO: 9 % (ref 4–12)
NEUTROPHILS # BLD AUTO: 10.37 THOUSANDS/ΜL (ref 1.85–7.62)
NEUTS SEG NFR BLD AUTO: 71 % (ref 43–75)
NRBC BLD AUTO-RTO: 0 /100 WBCS
PLATELET # BLD AUTO: 232 THOUSANDS/UL (ref 149–390)
PMV BLD AUTO: 11.5 FL (ref 8.9–12.7)
POTASSIUM SERPL-SCNC: 3.7 MMOL/L (ref 3.5–5.3)
PROT SERPL-MCNC: 7.6 G/DL (ref 6.4–8.4)
PROTHROMBIN TIME: 17.3 SECONDS (ref 11.6–14.5)
RBC # BLD AUTO: 5.19 MILLION/UL (ref 3.88–5.62)
SODIUM SERPL-SCNC: 137 MMOL/L (ref 135–147)
WBC # BLD AUTO: 14.86 THOUSAND/UL (ref 4.31–10.16)

## 2022-09-28 PROCEDURE — 80053 COMPREHEN METABOLIC PANEL: CPT | Performed by: EMERGENCY MEDICINE

## 2022-09-28 PROCEDURE — 93005 ELECTROCARDIOGRAM TRACING: CPT

## 2022-09-28 PROCEDURE — 99285 EMERGENCY DEPT VISIT HI MDM: CPT

## 2022-09-28 PROCEDURE — 99223 1ST HOSP IP/OBS HIGH 75: CPT | Performed by: INTERNAL MEDICINE

## 2022-09-28 PROCEDURE — 85730 THROMBOPLASTIN TIME PARTIAL: CPT

## 2022-09-28 PROCEDURE — 99285 EMERGENCY DEPT VISIT HI MDM: CPT | Performed by: EMERGENCY MEDICINE

## 2022-09-28 PROCEDURE — 85610 PROTHROMBIN TIME: CPT | Performed by: INTERNAL MEDICINE

## 2022-09-28 PROCEDURE — 82948 REAGENT STRIP/BLOOD GLUCOSE: CPT

## 2022-09-28 PROCEDURE — 85025 COMPLETE CBC W/AUTO DIFF WBC: CPT | Performed by: EMERGENCY MEDICINE

## 2022-09-28 PROCEDURE — 84484 ASSAY OF TROPONIN QUANT: CPT | Performed by: EMERGENCY MEDICINE

## 2022-09-28 PROCEDURE — 71045 X-RAY EXAM CHEST 1 VIEW: CPT

## 2022-09-28 PROCEDURE — 36415 COLL VENOUS BLD VENIPUNCTURE: CPT

## 2022-09-28 RX ORDER — ASPIRIN 325 MG
325 TABLET ORAL ONCE
Status: DISCONTINUED | OUTPATIENT
Start: 2022-09-28 | End: 2022-09-28

## 2022-09-28 RX ORDER — ACETAMINOPHEN 325 MG/1
650 TABLET ORAL EVERY 6 HOURS PRN
Status: DISCONTINUED | OUTPATIENT
Start: 2022-09-28 | End: 2022-10-02 | Stop reason: HOSPADM

## 2022-09-28 RX ORDER — METOPROLOL TARTRATE 100 MG/1
100 TABLET ORAL EVERY 12 HOURS SCHEDULED
Status: DISCONTINUED | OUTPATIENT
Start: 2022-09-28 | End: 2022-09-29

## 2022-09-28 RX ORDER — NITROGLYCERIN 0.4 MG/1
0.4 TABLET SUBLINGUAL
Status: DISCONTINUED | OUTPATIENT
Start: 2022-09-28 | End: 2022-10-02 | Stop reason: HOSPADM

## 2022-09-28 RX ORDER — EZETIMIBE 10 MG/1
5 TABLET ORAL DAILY
Status: DISCONTINUED | OUTPATIENT
Start: 2022-09-29 | End: 2022-10-02 | Stop reason: HOSPADM

## 2022-09-28 RX ORDER — HEPARIN SODIUM 1000 [USP'U]/ML
4000 INJECTION, SOLUTION INTRAVENOUS; SUBCUTANEOUS ONCE
Status: COMPLETED | OUTPATIENT
Start: 2022-09-28 | End: 2022-09-28

## 2022-09-28 RX ORDER — ASPIRIN 325 MG
325 TABLET ORAL ONCE
Status: COMPLETED | OUTPATIENT
Start: 2022-09-28 | End: 2022-09-28

## 2022-09-28 RX ORDER — ASPIRIN 81 MG/1
81 TABLET ORAL DAILY
Status: DISCONTINUED | OUTPATIENT
Start: 2022-09-29 | End: 2022-10-02 | Stop reason: HOSPADM

## 2022-09-28 RX ORDER — CLOPIDOGREL BISULFATE 75 MG/1
75 TABLET ORAL DAILY
Status: DISCONTINUED | OUTPATIENT
Start: 2022-09-29 | End: 2022-10-02 | Stop reason: HOSPADM

## 2022-09-28 RX ORDER — LEVOTHYROXINE SODIUM 88 UG/1
88 TABLET ORAL
Status: DISCONTINUED | OUTPATIENT
Start: 2022-09-29 | End: 2022-10-02 | Stop reason: HOSPADM

## 2022-09-28 RX ORDER — ASPIRIN 325 MG
325 TABLET ORAL DAILY
Status: DISCONTINUED | OUTPATIENT
Start: 2022-09-29 | End: 2022-09-28

## 2022-09-28 RX ORDER — ATORVASTATIN CALCIUM 40 MG/1
80 TABLET, FILM COATED ORAL
Status: DISCONTINUED | OUTPATIENT
Start: 2022-09-28 | End: 2022-10-02 | Stop reason: HOSPADM

## 2022-09-28 RX ORDER — CHLORTHALIDONE 25 MG/1
25 TABLET ORAL
Status: DISCONTINUED | OUTPATIENT
Start: 2022-09-29 | End: 2022-09-29

## 2022-09-28 RX ORDER — CLONAZEPAM 0.5 MG/1
0.5 TABLET ORAL 2 TIMES DAILY
Status: DISCONTINUED | OUTPATIENT
Start: 2022-09-28 | End: 2022-10-02 | Stop reason: HOSPADM

## 2022-09-28 RX ORDER — ISOSORBIDE DINITRATE 10 MG/1
20 TABLET ORAL 3 TIMES DAILY
Status: DISCONTINUED | OUTPATIENT
Start: 2022-09-28 | End: 2022-10-02 | Stop reason: HOSPADM

## 2022-09-28 RX ORDER — PREGABALIN 100 MG/1
100 CAPSULE ORAL 3 TIMES DAILY
Status: DISCONTINUED | OUTPATIENT
Start: 2022-09-28 | End: 2022-10-02 | Stop reason: HOSPADM

## 2022-09-28 RX ORDER — HEPARIN SODIUM 10000 [USP'U]/100ML
3-20 INJECTION, SOLUTION INTRAVENOUS
Status: DISCONTINUED | OUTPATIENT
Start: 2022-09-28 | End: 2022-10-02 | Stop reason: HOSPADM

## 2022-09-28 RX ORDER — LISINOPRIL 20 MG/1
40 TABLET ORAL
Status: DISCONTINUED | OUTPATIENT
Start: 2022-09-29 | End: 2022-10-02 | Stop reason: HOSPADM

## 2022-09-28 RX ORDER — GABAPENTIN 400 MG/1
400 CAPSULE ORAL 3 TIMES DAILY
Status: DISCONTINUED | OUTPATIENT
Start: 2022-09-28 | End: 2022-10-02 | Stop reason: HOSPADM

## 2022-09-28 RX ORDER — MELATONIN
2000 DAILY
Status: DISCONTINUED | OUTPATIENT
Start: 2022-09-29 | End: 2022-10-02 | Stop reason: HOSPADM

## 2022-09-28 RX ADMIN — ASPIRIN 325 MG ORAL TABLET 325 MG: 325 PILL ORAL at 17:45

## 2022-09-28 RX ADMIN — ATORVASTATIN CALCIUM 80 MG: 40 TABLET, FILM COATED ORAL at 21:27

## 2022-09-28 RX ADMIN — ISOSORBIDE DINITRATE 20 MG: 10 TABLET ORAL at 21:27

## 2022-09-28 RX ADMIN — HEPARIN SODIUM 4000 UNITS: 1000 INJECTION INTRAVENOUS; SUBCUTANEOUS at 17:50

## 2022-09-28 RX ADMIN — HEPARIN SODIUM 11.1 UNITS/KG/HR: 10000 INJECTION, SOLUTION INTRAVENOUS at 17:51

## 2022-09-28 RX ADMIN — INSULIN DETEMIR 70 UNITS: 100 INJECTION, SOLUTION SUBCUTANEOUS at 21:27

## 2022-09-28 RX ADMIN — Medication 2 TABLET: at 20:00

## 2022-09-28 RX ADMIN — METOPROLOL TARTRATE 100 MG: 100 TABLET, FILM COATED ORAL at 20:00

## 2022-09-28 RX ADMIN — CLONAZEPAM 0.5 MG: 0.5 TABLET ORAL at 20:00

## 2022-09-28 RX ADMIN — GABAPENTIN 400 MG: 400 CAPSULE ORAL at 20:00

## 2022-09-28 RX ADMIN — PREGABALIN 100 MG: 100 CAPSULE ORAL at 20:00

## 2022-09-28 NOTE — ASSESSMENT & PLAN NOTE
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 + 2 stent placements in may ( since then was on eliquis + asa 325mg )  Background of hypertension, hyperlipidemia , obesity, T2DM  CONSUELO score : 6  EKG: no acute ischemic changes, first degree AV block previously reported in Ekg 1/7/14  Troponins  181>226> 295 w/ deltas 45> 114  Heparin gtt and Asa 325 initiated in the Emergency department , he is on daily plavix  At the moment of my encounter AOx3 , pleasant  Chest pain free , stable bp , bradycardic 50's range    Plan  Cardiology consulted  Telemetry monitoring  Trend troponins  Will hold eliquis for now   Continue heparin gtt  Continue aspirin and plavix   Will continue metoprolol, hold parameters HR <50

## 2022-09-28 NOTE — ED PROCEDURE NOTE
Procedure  POC Cardiac US    Date/Time: 9/28/2022 4:44 PM  Performed by: Mell Harding MD  Authorized by: Mell Harding MD     Patient location:  ED  Other Assisting Provider: Yes (comment) (Dr Nina Lal)    Procedure details:     Exam Type:  Educational    Indications: hypotension      Indications comment:  Elevated troponin    Assessment / Evaluation for: cardiac function      Exam Type: initial exam      Image quality: limited diagnostic    Patient Details:     Cardiac Rhythm:  Regular    Mechanical ventilation: No    Cardiac findings:     Echo technique: limited 2D      Views obtained: parasternal long axis, parasternal short axis and apical      Pericardial effusion: absent      Tamponade physiology: absent      Wall motion: normal      LV systolic function: normal                       Mell Harding MD  09/28/22 1713

## 2022-09-28 NOTE — ED ATTENDING ATTESTATION
9/28/2022  IRonnell MD, saw and evaluated the patient  I have discussed the patient with the resident/non-physician practitioner and agree with the resident's/non-physician practitioner's findings, Plan of Care, and MDM as documented in the resident's/non-physician practitioner's note, except where noted  All available labs and Radiology studies were reviewed  I was present for key portions of any procedure(s) performed by the resident/non-physician practitioner and I was immediately available to provide assistance  At this point I agree with the current assessment done in the Emergency Department  I have conducted an independent evaluation of this patient a history and physical is as follows: This is a 70-year-old male patient with a history of CAD, presented to the ED for chest pain  Patient states this chest pain was left-sided, radiating to his left shoulder, without any other associated symptoms aside from some lightheadedness  He, on exam does not have any significant abnormalities  His differential diagnosis includes:  ACS versus pneumonia versus VTE versus other  Patient has CBC, metabolic panel, chest x-ray, EKG all of which were unremarkable  He had a troponin which was significantly elevated, concerning for NSTEMI, and had heparin started here in the ED, in addition to aspirin, and was hospitalized on to the thank you very much Sir the hospitalist service who accepted without any further orders requested      ED Course         Critical Care Time  Procedures

## 2022-09-28 NOTE — H&P
[facility]  H&P- Shane Carrizales 1950, 67 y o  male MRN: [id number]  Unit/Bed#: ED-35 Encounter: [id number]  Primary Care Provider: Tod Nunez DO   Date and time admitted to hospital: 9/28/2022  1:41 PM    * Chest pain  Assessment & Plan  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 + 2 stent placements in may ( since then was on eliquis + asa 325mg )  Background of hypertension, hyperlipidemia , obesity, T2DM  CONSUELO score : 6  EKG: no acute ischemic changes, first degree AV block previously reported in Ekg 1/7/14  Troponins  181>226> 295 w/ deltas 45> 114  Heparin gtt and Asa 325 initiated in the Emergency department , he is on daily plavix  At the moment of my encounter AOx3 , pleasant  Chest pain free , stable bp , bradycardic 50's range    Plan  Cardiology consulted  Telemetry monitoring  Trend troponins  Will hold eliquis for now   Continue heparin gtt  Continue aspirin and plavix   Will continue metoprolol, hold parameters HR <50            Type 2 diabetes mellitus ([address] )  Assessment & Plan  Lab Results   Component Value Date    HGBA1C 6 7 (H) 06/01/2022       Recent Labs     09/28/22  1916   POCGLU 67       Home regimen includes lantus 70 u bedtime regular 26 tid  Currently normoglycemic  Carbohydrate control diet , NPO @ midnight  hypoglicemia protocol  bs goal 140-180    Leukocytosis  Assessment & Plan  Wbc 14 86   Possibly reactive   Will monitor cbc    Hypothyroidism  Assessment & Plan  Continue levothyroxine    Obesity  Assessment & Plan  Educate and encourage life style modifications    Hyperlipidemia  Assessment & Plan  Continue atorvastatin    Hypertension  Assessment & Plan  Continue losartan    VTE Prophylaxis: Heparin Drip  / sequential compression device   Code Status: Level 1   POLST: POLST form is not discussed and not completed at this time      Anticipated Length of Stay:  Patient will be admitted on an Inpatient basis with an anticipated length of stay of  2 midnights  Justification for Hospital Stay: chest pain     Chief Complaint:   Chest pain      History of Present Illness:    Daniel Gee is a 67 y o  male  With a past medical history of hypertension, hyperlipidemia, hypothyroidism, and an extensive cardiac history including a triple vessel bypass in the 1980's  And recently s/p 2 stents placements in 5/31, comes today to the hospital for evaluation of chest pain  Patient mentioned that symptomatology started last night, he was in his normal state of health, watching tv ready to go to bed when he started to experience significant dizziness, eventually developed worsening midsternum chest discomfort described as pressure/ thigness, during this period of time his blood pressure was noted low in 2 consecutives checks, the pain did not exhibited any radiation, he experienced some blurry vision denied any GI disturbances  On admission, he was noted hemodynamically stable, afebrile, EKG did not showed ischemic changes however there was a progressive troponin elevation, patient was placed on heparin gtt aspirin 325 mg was given , he takes plavix on a daily basis  During my evaluation , patient chest pain result, on RA patti in the 50's wbc in the 55479 range  Patient will be [facility] in the [facility] service for further evaluation and management  Review of Systems:    Review of Systems   Constitutional: Positive for activity change  Cardiovascular: Positive for chest pain  Neurological: Positive for dizziness  All other systems reviewed and are negative        Past Medical and Surgical History:     Past Medical History:   Diagnosis Date    Atrial fibrillation [address] [address]     CAD (coronary artery disease)     Diabetes mellitus ([facility] [address]     IDDM    Heart attack [address] [address]     Heart disease     Hyperlipidemia     Hypertension     [facility]    Neuropathy     S/P triple vessel bypass     Sleep apnea     NO CPAP       Past Surgical History:   Procedure Laterality Date    BACK SURGERY  2010    benign tumor removed from spine    CORONARY ANGIOPLASTY  2009    SHOULDER SURGERY Right     TONSILLECTOMY         Meds/Allergies:    Prior to Admission medications    Medication Sig Start Date End Date Taking?  Authorizing Provider   apixaban (ELIQUIS) 5 mg apixaban 5 mg tablet    Historical Provider, MD   aspirin 325 mg tablet Take 325 mg by mouth daily at bedtime      Historical Provider, MD   atorvastatin (LIPITOR) 80 mg tablet Take 80 mg by mouth daily at bedtime      Historical Provider, MD   calcium-vitamin D (OSCAL) 250-125 MG-UNIT per tablet Take 2 tablets by mouth 2 (two) times a day    Historical Provider, MD   chlorthalidone 25 mg tablet Take 25 mg by mouth daily in the early morning      Historical Provider, MD   cholecalciferol (VITAMIN D3) 1,000 units tablet Take 2,000 Units by mouth daily    Historical Provider, MD   clonazePAM (KlonoPIN) 0 5 mg tablet Take 0 5 mg by mouth 2 (two) times a day    Historical Provider, MD   cyanocobalamin 1000 MCG tablet Take 100 mcg by mouth every 30 (thirty) days    Historical Provider, MD   EMPAGLIFLOZIN-LINAGLIPTIN PO Take 5 mg by mouth    Historical Provider, MD   ezetimibe (ZETIA) 10 mg tablet Take 5 mg by mouth daily    Historical Provider, MD   gabapentin (NEURONTIN) 400 mg capsule Take 400 mg by mouth 3 (three) times a day      Historical Provider, MD   insulin aspart (NovoLOG) 100 units/mL injection Inject under the skin    Historical Provider, MD   insulin detemir (LEVEMIR) 100 units/mL subcutaneous injection Inject 70 Units under the skin daily at bedtime      Historical Provider, MD   insulin glargine (Lantus) 100 units/mL subcutaneous injection Inject under the skin daily at bedtime    Historical Provider, MD   insulin regular (HumuLIN R,NovoLIN R) 100 units/mL injection Inject 26 Units under the skin 3 (three) times a day with meals      Historical Provider, MD   isosorbide dinitrate (ISORDIL) 20 mg tablet Take 20 mg by mouth 3 (three) times a day    Historical Provider, MD   Levothyroxine Sodium 88 MCG CAPS Take by mouth    Historical Provider, MD   lisinopril (ZESTRIL) 40 mg tablet Take 40 mg by mouth daily after breakfast      Historical Provider, MD   metFORMIN (GLUCOPHAGE) 1000 MG tablet Take 1,000 mg by mouth 2 (two) times a day with meals    Historical Provider, MD   metoprolol tartrate (LOPRESSOR) 100 mg tablet Take 100 mg by mouth every 12 (twelve) hours    Historical Provider, MD   pregabalin (LYRICA) 100 mg capsule Take 100 mg by mouth 3 (three) times a day    Historical Provider, MD     I have reviewed home medications with patient personally  Allergies: Allergies   Allergen Reactions    Amlodipine Lip Swelling       Social History:     Marital Status: /Civil Union   Occupation: retired   Patient Pre-hospital Living Situation: lives at home   Patient Pre-hospital Level of Mobility: ambulates without assistance  Patient Pre-hospital Diet Restrictions: none  Substance Use History:   Social History     Substance and Sexual Activity   Alcohol Use Not Currently    Comment: social     Social History     Tobacco Use   Smoking Status Former Smoker   Smokeless Tobacco Never Used   Tobacco Comment    quit 1989     Social History     Substance and Sexual Activity   Drug Use No       Family History:    History reviewed  No pertinent family history  Physical Exam:     Vitals:   Blood Pressure: 137/65 (09/28/22 1830)  Pulse: (!) 50 (09/28/22 1830)  Temperature: 97 8 °F (36 6 °C) (09/28/22 1320)  Respirations: 16 (09/28/22 1830)  Weight - Scale: 129 kg (284 lb 6 3 oz) (09/28/22 1742)  SpO2: 99 % (09/28/22 1830)    Physical Exam  Vitals and nursing note reviewed  Constitutional:       General: He is not in acute distress  Appearance: Normal appearance  He is normal weight   He is not toxic-appearing  HENT:      Head: Normocephalic and atraumatic  Right Ear: Tympanic membrane normal       Left Ear: Tympanic membrane normal       Nose: Nose normal       Mouth/Throat:      Mouth: Mucous membranes are moist       Pharynx: Oropharynx is clear  Eyes:      Extraocular Movements: Extraocular movements intact  Conjunctiva/sclera: Conjunctivae normal       Pupils: Pupils are equal, round, and reactive to light  Cardiovascular:      Rate and Rhythm: Normal rate  Pulses: Normal pulses  Heart sounds: No murmur heard  No gallop  Pulmonary:      Effort: Pulmonary effort is normal  No respiratory distress  Breath sounds: No wheezing or rales  Chest:      Chest wall: No tenderness  Abdominal:      General: Bowel sounds are normal  There is no distension  Palpations: Abdomen is soft  Tenderness: There is no abdominal tenderness  Musculoskeletal:         General: Normal range of motion  Cervical back: Normal range of motion  Skin:     General: Skin is warm  Capillary Refill: Capillary refill takes 2 to 3 seconds  Neurological:      Mental Status: He is alert and oriented to person, place, and time  Psychiatric:         Mood and Affect: Mood normal          Behavior: Behavior normal          Thought Content: Thought content normal          Judgment: Judgment normal              Additional Data:     Lab Results: I have personally reviewed pertinent reports        Results from last 7 days   Lab Units 09/28/22  1328   WBC Thousand/uL 14 86*   HEMOGLOBIN g/dL 13 8   HEMATOCRIT % 43 5   PLATELETS Thousands/uL 232   NEUTROS PCT % 71   LYMPHS PCT % 19   MONOS PCT % 9   EOS PCT % 1     Results from last 7 days   Lab Units 09/28/22  1328   POTASSIUM mmol/L 3 7   CHLORIDE mmol/L 104   CO2 mmol/L 27   BUN mg/dL 23   CREATININE mg/dL 1 13   CALCIUM mg/dL 9 6   ALK PHOS U/L 98   ALT U/L 17   AST U/L 16     Results from last 7 days   Lab Units 09/28/22  1328 INR  1 39*       Imaging: I have personally reviewed pertinent reports  No results found  EKG, Pathology, and Other Studies Reviewed on Admission:   · EKG: no ischemic changes , first degree av block     Epic / Care Everywhere Records Reviewed: Yes     ** Please Note: This note has been constructed using a voice recognition system       Nutrition Assessment and Intervention:     Reviewed food recall journal      Physical Activity Assessment and Intervention:    Activity journal reviewed      Emotional and Mental Well-being, Sleep, Connectedness Assessment and Intervention:    Sleep/stress assessment performed      Tobacco and Toxic Substance Assessment and Intervention:     Tobacco use screening performed    Alcohol and drug use screening performed

## 2022-09-28 NOTE — ED PROVIDER NOTES
History  Chief Complaint   Patient presents with    Chest Pain     Pt c/o chest tightness, belching, and states that his bp has been running low at home      Patient is a 69-year-old male with PMH of 3 vessel bypass decades ago and 2 stents placed 4 months ago along with diabetes that presents to the emergency department with episode of chest tightness and squeezing that started this morning after getting up to fetus birds  He reports that last night before bed he felt very lightheaded and fatigued as well and he reports intermittent blurred vision today  He reports that he is feeling much better currently and his chest tightness is almost completely gone  He also reports that he has been having many episodes of burping since waking up today which is unusual for him  He reports that when feeling this fatigue and chest tightness this morning he checked his blood pressure several times and was found to be 102/54 and he reports that he is normally in the one-teens to 196T systolic  He denies any actual pain it did not take a nitro when he felt the symptoms  He also reports several days of right leg pain but states that it goes from his right ankle to his right knee on the anterolateral side  He denies any recent illnesses or injuries  Denies recent travel, history of DVT or PE, new cough, shortness of breath, hemoptysis, cancer, or recent surgery other than his stent placement 4 months ago  Prior to Admission Medications   Prescriptions Last Dose Informant Patient Reported? Taking?    EMPAGLIFLOZIN-LINAGLIPTIN PO 9/28/2022 at Unknown time  Yes Yes   Sig: Take 5 mg by mouth   Levothyroxine Sodium 88 MCG CAPS 9/28/2022 at Unknown time Self Yes Yes   Sig: Take by mouth   apixaban (ELIQUIS) 5 mg 9/28/2022 at Unknown time Self Yes Yes   Sig: apixaban 5 mg tablet   aspirin 325 mg tablet Not Taking at Unknown time Self Yes No   Sig: Take 325 mg by mouth daily at bedtime     Patient not taking: Reported on 9/28/2022   atorvastatin (LIPITOR) 80 mg tablet 9/28/2022 at Unknown time Self Yes Yes   Sig: Take 80 mg by mouth daily at bedtime     calcium-vitamin D (OSCAL) 250-125 MG-UNIT per tablet 9/28/2022 at Unknown time Self Yes Yes   Sig: Take 2 tablets by mouth 2 (two) times a day   chlorthalidone 25 mg tablet 9/27/2022 at Unknown time Self Yes Yes   Sig: Take 25 mg by mouth daily in the early morning     cholecalciferol (VITAMIN D3) 1,000 units tablet 9/28/2022 at Unknown time Self Yes Yes   Sig: Take 2,000 Units by mouth daily   clonazePAM (KlonoPIN) 0 5 mg tablet 9/28/2022 at Unknown time Self Yes Yes   Sig: Take 0 5 mg by mouth 2 (two) times a day   cyanocobalamin 1000 MCG tablet 9/28/2022 at Unknown time Self Yes Yes   Sig: Take 100 mcg by mouth every 30 (thirty) days   ezetimibe (ZETIA) 10 mg tablet 9/27/2022 at Unknown time Self Yes Yes   Sig: Take 5 mg by mouth daily   gabapentin (NEURONTIN) 400 mg capsule Not Taking at Unknown time Self Yes No   Sig: Take 400 mg by mouth 3 (three) times a day     Patient not taking: Reported on 9/28/2022   insulin aspart (NovoLOG) 100 units/mL injection 9/28/2022 at Unknown time Self Yes Yes   Sig: Inject under the skin   insulin detemir (LEVEMIR) 100 units/mL subcutaneous injection 9/28/2022 at Unknown time Self Yes Yes   Sig: Inject 70 Units under the skin daily at bedtime     insulin glargine (Lantus) 100 units/mL subcutaneous injection 9/28/2022 at Unknown time Self Yes Yes   Sig: Inject under the skin daily at bedtime   insulin regular (HumuLIN R,NovoLIN R) 100 units/mL injection Not Taking at Unknown time Self Yes No   Sig: Inject 26 Units under the skin 3 (three) times a day with meals     Patient not taking: Reported on 9/28/2022   isosorbide dinitrate (ISORDIL) 20 mg tablet 9/28/2022 at Unknown time Self Yes Yes   Sig: Take 20 mg by mouth 3 (three) times a day   lisinopril (ZESTRIL) 40 mg tablet 9/28/2022 at Unknown time Self Yes Yes   Sig: Take 40 mg by mouth daily after breakfast     metFORMIN (GLUCOPHAGE) 1000 MG tablet 9/28/2022 at Unknown time Self Yes Yes   Sig: Take 1,000 mg by mouth 2 (two) times a day with meals   metoprolol tartrate (LOPRESSOR) 100 mg tablet 9/28/2022 at Unknown time Self Yes Yes   Sig: Take 100 mg by mouth every 12 (twelve) hours   pregabalin (LYRICA) 100 mg capsule 9/28/2022 at Unknown time Self Yes Yes   Sig: Take 100 mg by mouth 3 (three) times a day      Facility-Administered Medications: None       Past Medical History:   Diagnosis Date    Atrial fibrillation ([facility] )     CAD (coronary artery disease)     Diabetes mellitus ([facility] )     IDDM    Heart attack ([facility] )     Heart disease     Hyperlipidemia     Hypertension     Myocardial infarction ([facility] ) 1989    Neuropathy     S/P triple vessel bypass     Sleep apnea     NO CPAP       Past Surgical History:   Procedure Laterality Date    BACK SURGERY  2010    benign tumor removed from spine    CORONARY ANGIOPLASTY  2009    SHOULDER SURGERY Right     TONSILLECTOMY         History reviewed  No pertinent family history  I have reviewed and agree with the history as documented  E-Cigarette/Vaping     E-Cigarette/Vaping Substances     Social History     Tobacco Use    Smoking status: Former Smoker    Smokeless tobacco: Never Used    Tobacco comment: quit 1989   Substance Use Topics    Alcohol use: Not Currently     Comment: social    Drug use: No        Review of Systems   Constitutional: Positive for fatigue  Negative for chills and fever  HENT: Negative for ear pain and sore throat  Eyes: Negative for pain and visual disturbance  Respiratory: Positive for chest tightness  Negative for cough and shortness of breath  Cardiovascular: Negative for chest pain and palpitations  Gastrointestinal: Negative for abdominal pain, constipation, diarrhea, nausea and vomiting  Genitourinary: Negative for dysuria and hematuria  Musculoskeletal: Negative for arthralgias and back pain  Skin: Negative for color change and rash  Neurological: Positive for light-headedness (Resolved)  Negative for seizures and syncope  All other systems reviewed and are negative  Physical Exam  ED Triage Vitals [09/28/22 1320]   Temperature Pulse Respirations Blood Pressure SpO2   97 8 °F (36 6 °C) 62 16 149/67 97 %      Temp src Heart Rate Source Patient Position - Orthostatic VS BP Location FiO2 (%)   -- -- -- -- --      Pain Score       3             Orthostatic Vital Signs  Vitals:    09/28/22 1730 09/28/22 1830 09/28/22 2000 09/28/22 2112   BP: 132/71 137/65 (!) 176/74 147/68   Pulse: 57 (!) 50 59 58       Physical Exam  Vitals and nursing note reviewed  Constitutional:       General: He is not in acute distress  Appearance: He is well-developed  He is not ill-appearing  HENT:      Head: Normocephalic and atraumatic  Eyes:      Extraocular Movements: Extraocular movements intact  Conjunctiva/sclera: Conjunctivae normal    Cardiovascular:      Rate and Rhythm: Normal rate and regular rhythm  Pulses: Normal pulses  Radial pulses are 2+ on the right side and 2+ on the left side  Dorsalis pedis pulses are 2+ on the right side and 2+ on the left side  Heart sounds: Normal heart sounds  Heart sounds not distant  No murmur heard  Pulmonary:      Effort: Pulmonary effort is normal  No respiratory distress  Breath sounds: Normal breath sounds  No decreased breath sounds, wheezing, rhonchi or rales  Chest:      Chest wall: No tenderness or edema  Abdominal:      General: Abdomen is flat  Palpations: Abdomen is soft  Tenderness: There is no abdominal tenderness  There is no guarding or rebound  Musculoskeletal:         General: No swelling, tenderness or deformity  Normal range of motion  Cervical back: Normal range of motion and neck supple  Right lower leg: No edema  Left lower leg: No edema     Skin:     General: Skin is warm and dry       Capillary Refill: Capillary refill takes less than 2 seconds  Neurological:      Mental Status: He is alert           ED Medications  Medications   heparin (porcine) 25,000 units in 0 45% NaCl 250 mL infusion (premix) (11 1 Units/kg/hr × 90 kg (Order-Specific) Intravenous New Bag 9/28/22 1751)   atorvastatin (LIPITOR) tablet 80 mg (80 mg Oral Given 9/28/22 2127)   calcium carbonate-vitamin D (OSCAL-D) 500 mg-200 units per tablet 2 tablet (2 tablets Oral Given 9/28/22 2000)   chlorthalidone tablet 25 mg (has no administration in time range)   cholecalciferol (VITAMIN D3) tablet 2,000 Units (has no administration in time range)   clonazePAM (KlonoPIN) tablet 0 5 mg (0 5 mg Oral Given 9/28/22 2000)   cyanocobalamin (VITAMIN B-12) tablet 100 mcg (100 mcg Oral Not Given 9/28/22 2126)   ezetimibe (ZETIA) tablet 5 mg (has no administration in time range)   gabapentin (NEURONTIN) capsule 400 mg (400 mg Oral Given 9/28/22 2000)   insulin detemir (LEVEMIR) subcutaneous injection 70 Units (70 Units Subcutaneous Given 9/28/22 2127)   insulin regular (HumuLIN R,NovoLIN R) injection 26 Units (26 Units Subcutaneous Not Given 9/28/22 1924)   isosorbide dinitrate (ISORDIL) tablet 20 mg (20 mg Oral Given 9/28/22 2127)   levothyroxine tablet 88 mcg (has no administration in time range)   lisinopril (ZESTRIL) tablet 40 mg (has no administration in time range)   pregabalin (LYRICA) capsule 100 mg (100 mg Oral Given 9/28/22 2000)   metoprolol tartrate (LOPRESSOR) tablet 100 mg (100 mg Oral Given 9/28/22 2000)   clopidogrel (PLAVIX) tablet 75 mg (has no administration in time range)   aspirin (ECOTRIN LOW STRENGTH) EC tablet 81 mg (has no administration in time range)   nitroglycerin (NITROSTAT) SL tablet 0 4 mg (has no administration in time range)   acetaminophen (TYLENOL) tablet 650 mg (has no administration in time range)   morphine injection 2 mg (has no administration in time range)   aspirin tablet 325 mg (325 mg Oral Given 9/28/22 1745)   heparin (porcine) injection 4,000 Units (4,000 Units Intravenous Given 9/28/22 1750)       Diagnostic Studies  Results Reviewed     Procedure Component Value Units Date/Time    APTT [[id number]]     Lab Status: No result Specimen: Blood     Fingerstick Glucose (POCT) [[id number]]  (Normal) Collected: 09/28/22 1947    Lab Status: Final result Updated: 09/28/22 1948     POC Glucose 90 mg/dl     Fingerstick Glucose (POCT) [[id number]]  (Normal) Collected: 09/28/22 1916    Lab Status: Final result Updated: 09/28/22 1918     POC Glucose 67 mg/dl     HS Troponin I 4hr [[id number]]  (Abnormal) Collected: 09/28/22    Lab Status: Final result Specimen: Blood Updated: 09/28/22 1819     hs TnI 4hr 295 ng/L      Delta 4hr hsTnI 114 ng/L     APTT six (6) hours after Heparin bolus/drip initiation or dosing change [[id number]]  (Abnormal) Collected: 09/28/22 1328    Lab Status: Final result Specimen: Blood from Arm, Right Updated: 09/28/22 1758     PTT 44 seconds     Protime-INR [[id number]]  (Abnormal) Collected: 09/28/22 1328    Lab Status: Final result Specimen: Blood from Arm, Right Updated: 09/28/22 1758     Protime 17 3 seconds      INR 1 39    HS Troponin I 2hr [[id number]]  (Abnormal) Collected: 09/28/22 1551    Lab Status: Final result Specimen: Blood from Arm, Right Updated: 09/28/22 1625     hs TnI 2hr 226 ng/L      Delta 2hr hsTnI 45 ng/L     HS Troponin 0hr (reflex protocol) [[id number]]  (Abnormal) Collected: 09/28/22 1328    Lab Status: Final result Specimen: Blood from Arm, Right Updated: 09/28/22 1435     hs TnI 0hr 181 ng/L     Comprehensive metabolic panel [[id number]] Collected: 09/28/22 1328    Lab Status: Final result Specimen: Blood from Arm, Right Updated: 09/28/22 1428     Sodium 137 mmol/L      Potassium 3 7 mmol/L      Chloride 104 mmol/L      CO2 27 mmol/L      ANION GAP 6 mmol/L      BUN 23 mg/dL      Creatinine 1 13 mg/dL      Glucose 79 mg/dL      Calcium 9 6 mg/dL      AST 16 U/L ALT 17 U/L      Alkaline Phosphatase 98 U/L      Total Protein 7 6 g/dL      Albumin 3 6 g/dL      Total Bilirubin 0 93 mg/dL      eGFR 64 ml/min/1 73sq m     Narrative:      [place] guidelines for Chronic Kidney Disease (CKD):     Stage 1 with normal or high GFR (GFR > 90 mL/min/1 73 square meters)    Stage 2 Mild CKD (GFR = 60-89 mL/min/1 73 square meters)    Stage 3A Moderate CKD (GFR = 45-59 mL/min/1 73 square meters)    Stage 3B Moderate CKD (GFR = 30-44 mL/min/1 73 square meters)    Stage 4 Severe CKD (GFR = 15-29 mL/min/1 73 square meters)    Stage 5 End Stage CKD (GFR <15 mL/min/1 73 square meters)  Note: GFR calculation is accurate only with a steady state creatinine    CBC and differential [[id number]]  (Abnormal) Collected: 09/28/22 1328    Lab Status: Final result Specimen: Blood from Arm, Right Updated: 09/28/22 1408     WBC 14 86 Thousand/uL      RBC 5 19 Million/uL      Hemoglobin 13 8 g/dL      Hematocrit 43 5 %      MCV 84 fL      MCH 26 6 pg      MCHC 31 7 g/dL      RDW 18 5 %      MPV 11 5 fL      Platelets 391 Thousands/uL      nRBC 0 /100 WBCs      Neutrophils Relative 71 %      Immat GRANS % 0 %      Lymphocytes Relative 19 %      Monocytes Relative 9 %      Eosinophils Relative 1 %      Basophils Relative 0 %      Neutrophils Absolute 10 37 Thousands/µL      Immature Grans Absolute 0 06 Thousand/uL      Lymphocytes Absolute 2 85 Thousands/µL      Monocytes Absolute 1 34 Thousand/µL      Eosinophils Absolute 0 19 Thousand/µL      Basophils Absolute 0 05 Thousands/µL                  XR chest 1 view portable    (Results Pending)         Procedures  ECG 12 Lead Documentation Only    Date/Time: 9/28/2022 2:15 PM  Performed by: Gabriela Crain DO  Authorized by:  Gabriela Crain DO     Indications / Diagnosis:  Chest pain  ECG reviewed by me, the ED Provider: yes    Patient location:  ED  Previous ECG:     Previous ECG:  Compared to current    Similarity:  No change  Interpretation:     Interpretation: normal    Rate:     ECG rate:  61    ECG rate assessment: normal    Rhythm:     Rhythm: sinus rhythm    Ectopy:     Ectopy: none    QRS:     QRS axis:  Normal    QRS intervals:  Normal  Conduction:     Conduction: normal    ST segments:     ST segments:  Normal  T waves:     T waves: normal    Comments:      Normal sinus rhythm    No evidence of ischemia or dysrhythmia            ED Course             HEART Risk Score    Flowsheet Row Most Recent Value   Heart Score Risk Calculator    History 1 Filed at: 09/28/2022 1442   ECG 1 Filed at: 09/28/2022 1442   Age 2 Filed at: 09/28/2022 1442   Risk Factors 2 Filed at: 09/28/2022 1442   Troponin 2 Filed at: 09/28/2022 1442   HEART Score 8 Filed at: 09/28/2022 1442                        CONSUELO Risk Score    Flowsheet Row Most Recent Value   Age >= 72 1 Filed at: 09/28/2022 1816   Known CAD (stenosis >= 50%) 1 Filed at: 09/28/2022 1816   Recent (<=24 hrs) Service Angina 1 Filed at: 09/28/2022 1816   ST Deviation >= 0 5 mm 0 Filed at: 09/28/2022 1816   3+ CAD Risk Factors (FHx, HTN, HLP, DM, Smoker) 1 Filed at: 09/28/2022 1816   Aspirin Use Past 7 Days 1 Filed at: 09/28/2022 1816   Elevated Cardiac Markers 1 Filed at: 09/28/2022 1816   CONSUELO Risk Score (Calculated) 6 Filed at: 09/28/2022 1816        Wells' Criteria for PE    Flowsheet Row Most Recent Value   Wells' Criteria for PE    Clinical signs and symptoms of DVT 0 Filed at: 09/28/2022 1442   PE is primary diagnosis or equally likely 0 Filed at: 09/28/2022 1442   HR >100 0 Filed at: 09/28/2022 1442   Immobilization at least 3 days or Surgery in the previous 4 weeks 0 Filed at: 09/28/2022 1442   Previous, objectively diagnosed PE or DVT 0 Filed at: 09/28/2022 1442   Hemoptysis 0 Filed at: 09/28/2022 1442   Malignancy with treatment within 6 months or palliative 0 Filed at: 09/28/2022 1442   Wells' Criteria Total 0 Filed at: 09/28/2022 1442            MDM  Number of Diagnoses or Management Options  Elevated troponin  NSTEMI (non-ST elevated myocardial infarction) [facility])  Diagnosis management comments: 72M with significant ACS history reports to the emergency department after episode of chest tightness that started this morning  On physical exam the patient looks well  Laboratory evaluation including CBC, CMP, fingerstick blood glucose are within normal limits  However the patient's initial HS troponin is 181 with delta troponin of 45 at 2 hours and 114 at 4 hours  The patient's EKG is within normal limits and with elevated troponin and ACS history patient is likely experiencing NSTEMI at this time  The patient is started with heparin and aspirin  Repeat EKG is stable the patient is appropriate for admission under the care [facility] Internal Medicine  Disposition  Final diagnoses:   NSTEMI (non-ST elevated myocardial infarction) ([facility] )   Elevated troponin     Time reflects when diagnosis was documented in both MDM as applicable and the Disposition within this note     Time User Action Codes Description Comment    9/28/2022  5:26 PM Isabelle Chaparro [I21 4] NSTEMI (non-ST elevated myocardial infarction) ([facility] )     9/28/2022  5:26 PM Isabelle Chaparro [R77 8] Elevated troponin     9/28/2022  6:19 PM ProbTawana gaxiola Add [R07 9] Chest pain     9/28/2022  6:19 PM Tawana Correa Add [Z95 1] S/P triple vessel bypass       ED Disposition     ED Disposition   Admit    Condition   Stable    Date/Time   Wed Sep 28, 2022  5:26 PM    Comment   Case was discussed with SLIM and the patient's admission status was agreed to be Admission Status: inpatient status to the service of Dr Edis Kim             Follow-up Information    None         Current Discharge Medication List      CONTINUE these medications which have NOT CHANGED    Details   apixaban (ELIQUIS) 5 mg apixaban 5 mg tablet      atorvastatin (LIPITOR) 80 mg tablet Take 80 mg by mouth daily at bedtime        calcium-vitamin D (OSCAL) 250-125 MG-UNIT per tablet Take 2 tablets by mouth 2 (two) times a day      chlorthalidone 25 mg tablet Take 25 mg by mouth daily in the early morning        cholecalciferol (VITAMIN D3) 1,000 units tablet Take 2,000 Units by mouth daily      clonazePAM (KlonoPIN) 0 5 mg tablet Take 0 5 mg by mouth 2 (two) times a day      cyanocobalamin 1000 MCG tablet Take 100 mcg by mouth every 30 (thirty) days      EMPAGLIFLOZIN-LINAGLIPTIN PO Take 5 mg by mouth      ezetimibe (ZETIA) 10 mg tablet Take 5 mg by mouth daily      insulin aspart (NovoLOG) 100 units/mL injection Inject under the skin      insulin detemir (LEVEMIR) 100 units/mL subcutaneous injection Inject 70 Units under the skin daily at bedtime        insulin glargine (Lantus) 100 units/mL subcutaneous injection Inject under the skin daily at bedtime      isosorbide dinitrate (ISORDIL) 20 mg tablet Take 20 mg by mouth 3 (three) times a day      Levothyroxine Sodium 88 MCG CAPS Take by mouth      lisinopril (ZESTRIL) 40 mg tablet Take 40 mg by mouth daily after breakfast        metFORMIN (GLUCOPHAGE) 1000 MG tablet Take 1,000 mg by mouth 2 (two) times a day with meals      metoprolol tartrate (LOPRESSOR) 100 mg tablet Take 100 mg by mouth every 12 (twelve) hours      pregabalin (LYRICA) 100 mg capsule Take 100 mg by mouth 3 (three) times a day      aspirin 325 mg tablet Take 325 mg by mouth daily at bedtime        gabapentin (NEURONTIN) 400 mg capsule Take 400 mg by mouth 3 (three) times a day        insulin regular (HumuLIN R,NovoLIN R) 100 units/mL injection Inject 26 Units under the skin 3 (three) times a day with meals             No discharge procedures on file  PDMP Review     None           ED Provider  Attending physically available and evaluated Evy Boyce I managed the patient along with the ED Attending      Electronically Signed by         Andrea Morris DO  09/28/22 9909

## 2022-09-28 NOTE — ASSESSMENT & PLAN NOTE
Lab Results   Component Value Date    HGBA1C 6 7 (H) 06/01/2022       Recent Labs     09/28/22  1916   POCGLU 67       Home regimen includes lantus 70 u bedtime regular 26 tid  Currently normoglycemic  Carbohydrate control diet , NPO @ midnight  hypoglicemia protocol  bs goal 140-180

## 2022-09-29 ENCOUNTER — APPOINTMENT (INPATIENT)
Dept: NON INVASIVE DIAGNOSTICS | Facility: HOSPITAL | Age: 72
DRG: 303 | End: 2022-09-29
Payer: COMMERCIAL

## 2022-09-29 ENCOUNTER — APPOINTMENT (OUTPATIENT)
Dept: NUCLEAR MEDICINE | Facility: HOSPITAL | Age: 72
DRG: 303 | End: 2022-09-29
Payer: COMMERCIAL

## 2022-09-29 ENCOUNTER — EPISODE CHANGES (OUTPATIENT)
Dept: CASE MANAGEMENT | Facility: OTHER | Age: 72
End: 2022-09-29

## 2022-09-29 LAB
ANION GAP SERPL CALCULATED.3IONS-SCNC: 6 MMOL/L (ref 4–13)
AORTIC ROOT: 3.4 CM
APICAL FOUR CHAMBER EJECTION FRACTION: 55 %
APTT PPP: 72 SECONDS (ref 23–37)
APTT PPP: 72 SECONDS (ref 23–37)
APTT PPP: 85 SECONDS (ref 23–37)
ASCENDING AORTA: 3.4 CM
ATRIAL RATE: 58 BPM
ATRIAL RATE: 61 BPM
BASOPHILS # BLD AUTO: 0.04 THOUSANDS/ΜL (ref 0–0.1)
BASOPHILS NFR BLD AUTO: 0 % (ref 0–1)
BUN SERPL-MCNC: 23 MG/DL (ref 5–25)
CALCIUM SERPL-MCNC: 9.1 MG/DL (ref 8.4–10.2)
CARDIAC TROPONIN I PNL SERPL HS: 75 NG/L
CHEST PAIN STATEMENT: NORMAL
CHLORIDE SERPL-SCNC: 102 MMOL/L (ref 96–108)
CHOLEST SERPL-MCNC: 75 MG/DL
CO2 SERPL-SCNC: 27 MMOL/L (ref 21–32)
CREAT SERPL-MCNC: 1.06 MG/DL (ref 0.6–1.3)
E WAVE DECELERATION TIME: 258 MS
EOSINOPHIL # BLD AUTO: 0.28 THOUSAND/ΜL (ref 0–0.61)
EOSINOPHIL NFR BLD AUTO: 2 % (ref 0–6)
ERYTHROCYTE [DISTWIDTH] IN BLOOD BY AUTOMATED COUNT: 17.9 % (ref 11.6–15.1)
EST. AVERAGE GLUCOSE BLD GHB EST-MCNC: 148 MG/DL
FRACTIONAL SHORTENING: 45 (ref 28–44)
GFR SERPL CREATININE-BSD FRML MDRD: 69 ML/MIN/1.73SQ M
GLUCOSE SERPL-MCNC: 160 MG/DL (ref 65–140)
GLUCOSE SERPL-MCNC: 59 MG/DL (ref 65–140)
GLUCOSE SERPL-MCNC: 65 MG/DL (ref 65–140)
GLUCOSE SERPL-MCNC: 82 MG/DL (ref 65–140)
GLUCOSE SERPL-MCNC: 97 MG/DL (ref 65–140)
HBA1C MFR BLD: 6.8 %
HCT VFR BLD AUTO: 40.1 % (ref 36.5–49.3)
HDLC SERPL-MCNC: 25 MG/DL
HGB BLD-MCNC: 13.2 G/DL (ref 12–17)
IMM GRANULOCYTES # BLD AUTO: 0.05 THOUSAND/UL (ref 0–0.2)
IMM GRANULOCYTES NFR BLD AUTO: 0 % (ref 0–2)
INTERVENTRICULAR SEPTUM IN DIASTOLE (PARASTERNAL SHORT AXIS VIEW): 1.1 CM
INTERVENTRICULAR SEPTUM: 1.1 CM (ref 0.6–1.1)
LAAS-AP2: 22.8 CM2
LAAS-AP4: 22.5 CM2
LDLC SERPL CALC-MCNC: 27 MG/DL (ref 0–100)
LEFT ATRIUM SIZE: 3.6 CM
LEFT INTERNAL DIMENSION IN SYSTOLE: 2.7 CM (ref 2.1–4)
LEFT VENTRICULAR INTERNAL DIMENSION IN DIASTOLE: 4.9 CM (ref 3.5–6)
LEFT VENTRICULAR POSTERIOR WALL IN END DIASTOLE: 1.1 CM
LEFT VENTRICULAR STROKE VOLUME: 86 ML
LVSV (TEICH): 86 ML
LYMPHOCYTES # BLD AUTO: 2.24 THOUSANDS/ΜL (ref 0.6–4.47)
LYMPHOCYTES NFR BLD AUTO: 19 % (ref 14–44)
MAX DIASTOLIC BP: 60 MMHG
MAX HEART RATE: 68 BPM
MAX PREDICTED HEART RATE: 148 BPM
MAX. SYSTOLIC BP: 141 MMHG
MCH RBC QN AUTO: 27.2 PG (ref 26.8–34.3)
MCHC RBC AUTO-ENTMCNC: 32.9 G/DL (ref 31.4–37.4)
MCV RBC AUTO: 83 FL (ref 82–98)
MONOCYTES # BLD AUTO: 0.95 THOUSAND/ΜL (ref 0.17–1.22)
MONOCYTES NFR BLD AUTO: 8 % (ref 4–12)
MV E'TISSUE VEL-SEP: 6 CM/S
MV PEAK A VEL: 0.84 M/S
MV PEAK E VEL: 70 CM/S
MV STENOSIS PRESSURE HALF TIME: 75 MS
MV VALVE AREA P 1/2 METHOD: 2.93
NEUTROPHILS # BLD AUTO: 8.03 THOUSANDS/ΜL (ref 1.85–7.62)
NEUTS SEG NFR BLD AUTO: 71 % (ref 43–75)
NONHDLC SERPL-MCNC: 50 MG/DL
NRBC BLD AUTO-RTO: 0 /100 WBCS
NUC STRESS EJECTION FRACTION: 60 %
P AXIS: 18 DEGREES
P AXIS: 31 DEGREES
PLATELET # BLD AUTO: 213 THOUSANDS/UL (ref 149–390)
PMV BLD AUTO: 11.6 FL (ref 8.9–12.7)
POTASSIUM SERPL-SCNC: 3.5 MMOL/L (ref 3.5–5.3)
PR INTERVAL: 214 MS
PR INTERVAL: 234 MS
PROTOCOL NAME: NORMAL
QRS AXIS: -55 DEGREES
QRS AXIS: -69 DEGREES
QRSD INTERVAL: 92 MS
QRSD INTERVAL: 96 MS
QT INTERVAL: 406 MS
QT INTERVAL: 414 MS
QTC INTERVAL: 406 MS
QTC INTERVAL: 408 MS
RATE PRESSURE PRODUCT: 9588
RBC # BLD AUTO: 4.86 MILLION/UL (ref 3.88–5.62)
REASON FOR TERMINATION: NORMAL
RIGHT ATRIUM AREA SYSTOLE A4C: 13.9 CM2
RIGHT VENTRICLE ID DIMENSION: 3.7 CM
SL CV LEFT ATRIUM LENGTH A2C: 5.6 CM
SL CV LV EF: 60
SL CV PED ECHO LEFT VENTRICLE DIASTOLIC VOLUME (MOD BIPLANE) 2D: 114 ML
SL CV PED ECHO LEFT VENTRICLE SYSTOLIC VOLUME (MOD BIPLANE) 2D: 28 ML
SL CV REST NUCLEAR ISOTOPE DOSE: 16.4 MCI
SL CV STRESS NUCLEAR ISOTOPE DOSE: 45 MCI
SL CV STRESS RECOVERY BP: NORMAL MMHG
SL CV STRESS RECOVERY HR: 62 BPM
SL CV STRESS RECOVERY O2 SAT: 98 %
SODIUM SERPL-SCNC: 135 MMOL/L (ref 135–147)
STRESS ANGINA INDEX: 0
STRESS BASELINE BP: NORMAL MMHG
STRESS BASELINE HR: 62 BPM
STRESS O2 SAT REST: 98 %
STRESS PEAK HR: 68 BPM
STRESS POST O2 SAT PEAK: 99 %
STRESS POST PEAK BP: 141 MMHG
STRESS/REST PERFUSION RATIO: 1.14
T WAVE AXIS: 75 DEGREES
T WAVE AXIS: 86 DEGREES
TARGET HR FORMULA: NORMAL
TEST INDICATION: NORMAL
TIME IN EXERCISE PHASE: NORMAL
TRIGL SERPL-MCNC: 114 MG/DL
VENTRICULAR RATE: 58 BPM
VENTRICULAR RATE: 61 BPM
WBC # BLD AUTO: 11.59 THOUSAND/UL (ref 4.31–10.16)

## 2022-09-29 PROCEDURE — 85025 COMPLETE CBC W/AUTO DIFF WBC: CPT | Performed by: INTERNAL MEDICINE

## 2022-09-29 PROCEDURE — 85730 THROMBOPLASTIN TIME PARTIAL: CPT | Performed by: INTERNAL MEDICINE

## 2022-09-29 PROCEDURE — 93017 CV STRESS TEST TRACING ONLY: CPT

## 2022-09-29 PROCEDURE — 78452 HT MUSCLE IMAGE SPECT MULT: CPT

## 2022-09-29 PROCEDURE — 93005 ELECTROCARDIOGRAM TRACING: CPT

## 2022-09-29 PROCEDURE — 84484 ASSAY OF TROPONIN QUANT: CPT

## 2022-09-29 PROCEDURE — 82948 REAGENT STRIP/BLOOD GLUCOSE: CPT

## 2022-09-29 PROCEDURE — 78452 HT MUSCLE IMAGE SPECT MULT: CPT | Performed by: INTERNAL MEDICINE

## 2022-09-29 PROCEDURE — 93010 ELECTROCARDIOGRAM REPORT: CPT | Performed by: INTERNAL MEDICINE

## 2022-09-29 PROCEDURE — 93306 TTE W/DOPPLER COMPLETE: CPT | Performed by: INTERNAL MEDICINE

## 2022-09-29 PROCEDURE — 83036 HEMOGLOBIN GLYCOSYLATED A1C: CPT | Performed by: INTERNAL MEDICINE

## 2022-09-29 PROCEDURE — 80048 BASIC METABOLIC PNL TOTAL CA: CPT | Performed by: INTERNAL MEDICINE

## 2022-09-29 PROCEDURE — A9502 TC99M TETROFOSMIN: HCPCS

## 2022-09-29 PROCEDURE — 99223 1ST HOSP IP/OBS HIGH 75: CPT | Performed by: INTERNAL MEDICINE

## 2022-09-29 PROCEDURE — 93016 CV STRESS TEST SUPVJ ONLY: CPT | Performed by: INTERNAL MEDICINE

## 2022-09-29 PROCEDURE — G1004 CDSM NDSC: HCPCS

## 2022-09-29 PROCEDURE — 99232 SBSQ HOSP IP/OBS MODERATE 35: CPT | Performed by: INTERNAL MEDICINE

## 2022-09-29 PROCEDURE — 80061 LIPID PANEL: CPT | Performed by: INTERNAL MEDICINE

## 2022-09-29 PROCEDURE — 93018 CV STRESS TEST I&R ONLY: CPT | Performed by: INTERNAL MEDICINE

## 2022-09-29 PROCEDURE — 93306 TTE W/DOPPLER COMPLETE: CPT

## 2022-09-29 RX ORDER — SODIUM CHLORIDE 9 MG/ML
100 INJECTION, SOLUTION INTRAVENOUS CONTINUOUS
Status: DISCONTINUED | OUTPATIENT
Start: 2022-09-30 | End: 2022-09-30

## 2022-09-29 RX ORDER — ASPIRIN 81 MG/1
324 TABLET, CHEWABLE ORAL ONCE
Status: COMPLETED | OUTPATIENT
Start: 2022-09-30 | End: 2022-09-30

## 2022-09-29 RX ORDER — ASPIRIN 81 MG/1
324 TABLET, CHEWABLE ORAL ONCE
Status: DISCONTINUED | OUTPATIENT
Start: 2022-09-29 | End: 2022-10-02 | Stop reason: HOSPADM

## 2022-09-29 RX ORDER — SODIUM CHLORIDE 9 MG/ML
100 INJECTION, SOLUTION INTRAVENOUS CONTINUOUS
Status: DISCONTINUED | OUTPATIENT
Start: 2022-09-29 | End: 2022-09-29

## 2022-09-29 RX ORDER — CHLORTHALIDONE 25 MG/1
12.5 TABLET ORAL
Status: DISCONTINUED | OUTPATIENT
Start: 2022-09-30 | End: 2022-10-02 | Stop reason: HOSPADM

## 2022-09-29 RX ORDER — METOPROLOL TARTRATE 50 MG/1
50 TABLET, FILM COATED ORAL EVERY 12 HOURS SCHEDULED
Status: DISCONTINUED | OUTPATIENT
Start: 2022-09-29 | End: 2022-10-02 | Stop reason: HOSPADM

## 2022-09-29 RX ADMIN — ATORVASTATIN CALCIUM 80 MG: 40 TABLET, FILM COATED ORAL at 23:41

## 2022-09-29 RX ADMIN — LISINOPRIL 40 MG: 20 TABLET ORAL at 09:21

## 2022-09-29 RX ADMIN — METOPROLOL TARTRATE 100 MG: 100 TABLET, FILM COATED ORAL at 09:21

## 2022-09-29 RX ADMIN — ISOSORBIDE DINITRATE 20 MG: 10 TABLET ORAL at 17:52

## 2022-09-29 RX ADMIN — NITROGLYCERIN 0.4 MG: 0.4 TABLET SUBLINGUAL at 20:58

## 2022-09-29 RX ADMIN — PREGABALIN 100 MG: 100 CAPSULE ORAL at 23:42

## 2022-09-29 RX ADMIN — LEVOTHYROXINE SODIUM 88 MCG: 88 TABLET ORAL at 05:06

## 2022-09-29 RX ADMIN — REGADENOSON 0.4 MG: 0.08 INJECTION, SOLUTION INTRAVENOUS at 11:48

## 2022-09-29 RX ADMIN — EZETIMIBE 5 MG: 10 TABLET ORAL at 09:21

## 2022-09-29 RX ADMIN — Medication 2 TABLET: at 17:52

## 2022-09-29 RX ADMIN — CLOPIDOGREL BISULFATE 75 MG: 75 TABLET ORAL at 09:21

## 2022-09-29 RX ADMIN — PREGABALIN 100 MG: 100 CAPSULE ORAL at 09:21

## 2022-09-29 RX ADMIN — HEPARIN SODIUM 11.1 UNITS/KG/HR: 10000 INJECTION, SOLUTION INTRAVENOUS at 17:47

## 2022-09-29 RX ADMIN — CLONAZEPAM 0.5 MG: 0.5 TABLET ORAL at 09:21

## 2022-09-29 RX ADMIN — ISOSORBIDE DINITRATE 20 MG: 10 TABLET ORAL at 23:42

## 2022-09-29 RX ADMIN — CLONAZEPAM 0.5 MG: 0.5 TABLET ORAL at 17:52

## 2022-09-29 RX ADMIN — PREGABALIN 100 MG: 100 CAPSULE ORAL at 17:52

## 2022-09-29 RX ADMIN — ASPIRIN 81 MG: 81 TABLET, COATED ORAL at 09:21

## 2022-09-29 RX ADMIN — ISOSORBIDE DINITRATE 20 MG: 10 TABLET ORAL at 09:21

## 2022-09-29 RX ADMIN — CHLORTHALIDONE 12.5 MG: 25 TABLET ORAL at 05:06

## 2022-09-29 RX ADMIN — INSULIN HUMAN 7 UNITS: 100 INJECTION, SOLUTION PARENTERAL at 18:41

## 2022-09-29 RX ADMIN — METOPROLOL TARTRATE 50 MG: 50 TABLET, FILM COATED ORAL at 23:42

## 2022-09-29 RX ADMIN — NITROGLYCERIN 0.4 MG: 0.4 TABLET SUBLINGUAL at 21:36

## 2022-09-29 RX ADMIN — INSULIN DETEMIR 20 UNITS: 100 INJECTION, SOLUTION SUBCUTANEOUS at 23:41

## 2022-09-29 NOTE — CONSULTS
Consultation - Cardiology Team One  Suzy Purdy 67 y o  male MRN: [id number]  Unit/Bed#: S -01 Encounter: [id number]    Inpatient consult to Cardiology  Consult performed by: Bruno Lozano PA-C  Consult ordered by: Alise Guzmán MD          Physician Requesting Consult: Analilia Ferris*  Reason for Consult / Principal Problem:  Chest pain, elevated troponin    Assessment:    1  Chest pain:  Presented with squeezing 7/10substernal chest pain with associated belching, lightheadedness and blurry vision  Had moderate improvement in pain after taking SLN  Pain lasted several hours before resolving  This was different than his typical anginal pain  EKG with no acute ischemic change  HS troponin 181--> 226--> 295  Patient received 325 mg of aspirin was started on heparin infusion  · Had recurrent brief squeezing chest pain after eating  Currently chest pain-free  2  CAD:  Maintained on Plavix, beta-blocker, statin  No aspirin due to anticoagulation with Eliquis  · History of remote inferior MI in 1989  · TIMOTHY to [address] [place] [address] in 2009  · s/p CABG x3 in 2017 in [place]  · s/p TIMOTHY x2 to LM and LCX at [facility] on 05/31/2022  3  PAF:  Asymptomatic, diagnosed after his bypass surgery 5 years ago  Currently maintaining NSR on Lopressor 100 mg BID  Typically anticoagulated on Eliquis which has been switched to heparin infusion this admission  4  Sinus bradycardia:  Heart rates mostly in the 50s on telemetry which patient states is a very unusual for him  He denies any current lightheadedness or dizziness  No evidence of high-degree AV block or pauses  He is on Lopressor 100 mg BID  5  Preserved LV systolic function:  Per patient report  No echo report available for review  6  Essential hypertension:  Average /66 on Isordil 20 mg TID, chlorthalidone 25 mg daily, lisinopril 40 mg daily, Lopressor 100 mg BID    7  Hyperlipidemia: TC 75, , HDL 25, LDL 27 on atorvastatin 80 mg daily and Zetia 5 mg daily  8  Type 2 DM:  Hemoglobin A1c 6 8  Management per primary team         Plan/Recommendations:  · Echocardiogram to evaluate heart function and valvular status  · Check orthostatic vital signs  · Decrease Lopressor to 50 mg BID  · Will plan for a nuclear stress test today  · Will attempt to obtain records from his primary cardiologist Dr Cici Parra at the [place]    ______________________________________________________    CC:  Chest pain      History of Present Illness   HPI: Shahana Douglas is a 67y o  year old male who has CAD with remote inferior MI in 1989, TIMOTHY to [address] in 2009, s/p CABG x3 in 2017,  with recent TIMOTHY x2 to LM and LCX in 05/31/2022 at [place], [place], essential hypertension, hyperlipidemia, type 2 DM who previously followed with cardiologist Dr Kaylyn Matthews  He now follows with the [place] in cardiologist Dr Cici Parra  Patient presents to the emergency room at [facility] on 09/28/2022 with complaints of chest pain  Patient reported squeezing chest pain in the morning when he got up to feed the birds with associated belching and blurred vision  He took a sublingual nitro with improvement in his chest pain but not complete resolution  His systolic BP was in the 244Z after taking nitro  The evening prior he had noticed fatigue and lightheadedness  On arrival to the ED patient's vital signs were stable with /67 and oxygen saturation 97% on room air  EKG revealed sinus rhythm with no acute ischemic change  CXR revealed no acute cardiopulmonary disease  Labs revealed stable CMP, HS troponin 181--> 226--> 295, WBC 14 otherwise stable CBC, TC 75, , HDL 25, LDL 27  Patient received 325 mg of aspirin and was started on heparin infusion  Cardiology has been consulted for further evaluation management of chest pain and elevated troponin      Home medication regimen includes Eliquis 5 mg BID atorvastatin 80 mg daily, chlorthalidone 25 mg daily, Zetia 5 mg daily, Plavix 75 mg daily, Isordil 20 mg TID, lisinopril 40 mg daily, Lopressor 100 mg BID  Patient resting in a chair during consultation and states that this chest pain was different than the pain he had prior to stenting in the past   His typical anginal chest pain is described as a sharp pain  Yesterday when he had walked out to feed the birds he felt lightheaded with blurry vision and subsequently had a squeezing 7/10 chest pain  He took 1 sublingual nitro with some relief but not complete resolution  His pain lasted several hours and states it was around 4:00 p m  Yesterday before it resolved  After eating he had some brief recurrent pain  He is currently chest pain-free  He denies any history of CHF  He has been compliant with Plavix and Eliquis  He denies any recurrent lightheadedness, dizziness, syncope or near-syncope  He denies any orthopnea, PND  He has chronic lower extremity edema that is unchanged  Nuclear stress test 07/20/2015:  No significant reversible defect demonstrated, EF 51%  EKG reviewed personally: 9/28/2022-sinus bradycardia at a rate of 58 beats per minute with first-degree AVB, incomplete RBBB, age-indeterminate anterior/ inferior infarct and PVCs  When compared to the prior EKG from the same day PVCs were not noted  Telemetry reviewed personally:  Sinus rhythm with heart rates 50-70s with occasional PVCs  Heart rates mostly in the 50s  Review of Systems   Constitutional: Negative  Negative for chills  Cardiovascular: Positive for chest pain and leg swelling  Negative for dyspnea on exertion, near-syncope, orthopnea, palpitations, paroxysmal nocturnal dyspnea and syncope  Respiratory: Negative  Negative for cough, shortness of breath and wheezing  Endocrine: Negative  Hematologic/Lymphatic: Negative  Skin: Negative  Musculoskeletal: Negative  Gastrointestinal: Negative  Negative for diarrhea, nausea and vomiting     Neurological: Negative for dizziness, light-headedness and weakness  Psychiatric/Behavioral: Negative  Negative for altered mental status  All other systems reviewed and are negative  Historical Information   Past Medical History:   Diagnosis Date    Atrial fibrillation ([facility] )     CAD (coronary artery disease)     Diabetes mellitus ([facility] )     IDDM    Heart attack ([facility] )     Heart disease     Hyperlipidemia     Hypertension     Myocardial infarction ([place] [address] ) 1989    Neuropathy     S/P triple vessel bypass     Sleep apnea     NO CPAP     Past Surgical History:   Procedure Laterality Date    BACK SURGERY  2010    benign tumor removed from spine    CORONARY ANGIOPLASTY  2009    SHOULDER SURGERY Right     TONSILLECTOMY       Social History     Substance and Sexual Activity   Alcohol Use Not Currently    Comment: social     Social History     Substance and Sexual Activity   Drug Use No     Social History     Tobacco Use   Smoking Status Former Smoker   Smokeless Tobacco Never Used   Tobacco Comment    quit 1989     Family History: History reviewed  No pertinent family history      Meds/Allergies   all current active meds have been reviewed, current meds:   Current Facility-Administered Medications   Medication Dose Route Frequency    acetaminophen (TYLENOL) tablet 650 mg  650 mg Oral Q6H PRN    aspirin (ECOTRIN LOW STRENGTH) EC tablet 81 mg  81 mg Oral Daily    atorvastatin (LIPITOR) tablet 80 mg  80 mg Oral HS    calcium carbonate-vitamin D (OSCAL-D) 500 mg-200 units per tablet 2 tablet  2 tablet Oral BID    [START ON 9/30/2022] chlorthalidone tablet 12 5 mg  12 5 mg Oral Early Morning    cholecalciferol (VITAMIN D3) tablet 2,000 Units  2,000 Units Oral Daily    clonazePAM (KlonoPIN) tablet 0 5 mg  0 5 mg Oral BID    clopidogrel (PLAVIX) tablet 75 mg  75 mg Oral Daily    cyanocobalamin (VITAMIN B-12) tablet 100 mcg  100 mcg Oral Q30 Days    ezetimibe (ZETIA) tablet 5 mg  5 mg Oral Daily    gabapentin (NEURONTIN) capsule 400 mg  400 mg Oral TID    heparin (porcine) 25,000 units in 0 45% NaCl 250 mL infusion (premix)  3-20 Units/kg/hr (Order-Specific) Intravenous Titrated    insulin detemir (LEVEMIR) subcutaneous injection 70 Units  70 Units Subcutaneous HS    insulin regular (HumuLIN R,NovoLIN R) injection 26 Units  26 Units Subcutaneous TID With Meals    isosorbide dinitrate (ISORDIL) tablet 20 mg  20 mg Oral TID    levothyroxine tablet 88 mcg  88 mcg Oral Early Morning    lisinopril (ZESTRIL) tablet 40 mg  40 mg Oral After Breakfast    metoprolol tartrate (LOPRESSOR) tablet 100 mg  100 mg Oral Q12H [facility]    morphine injection 2 mg  2 mg Intravenous Q4H PRN    nitroglycerin (NITROSTAT) SL tablet 0 4 mg  0 4 mg Sublingual Q5 Min PRN    pregabalin (LYRICA) capsule 100 mg  100 mg Oral TID    and PTA meds:   Prior to Admission Medications   Prescriptions Last Dose Informant Patient Reported? Taking?    EMPAGLIFLOZIN-LINAGLIPTIN PO 9/28/2022 at Unknown time  Yes Yes   Sig: Take 5 mg by mouth   Levothyroxine Sodium 88 MCG CAPS 9/28/2022 at Unknown time Self Yes Yes   Sig: Take by mouth   apixaban (ELIQUIS) 5 mg 9/28/2022 at Unknown time Self Yes Yes   Sig: apixaban 5 mg tablet   aspirin 325 mg tablet Not Taking at Unknown time Self Yes No   Sig: Take 325 mg by mouth daily at bedtime     Patient not taking: Reported on 9/28/2022   atorvastatin (LIPITOR) 80 mg tablet 9/28/2022 at Unknown time Self Yes Yes   Sig: Take 80 mg by mouth daily at bedtime     calcium-vitamin D (OSCAL) 250-125 MG-UNIT per tablet 9/28/2022 at Unknown time Self Yes Yes   Sig: Take 2 tablets by mouth 2 (two) times a day   chlorthalidone 25 mg tablet 9/27/2022 at Unknown time Self Yes Yes   Sig: Take 25 mg by mouth daily in the early morning     cholecalciferol (VITAMIN D3) 1,000 units tablet 9/28/2022 at Unknown time Self Yes Yes   Sig: Take 2,000 Units by mouth daily   clonazePAM (KlonoPIN) 0 5 mg tablet 9/28/2022 at Unknown time Self Yes Yes   Sig: Take 0 5 mg by mouth 2 (two) times a day   cyanocobalamin 1000 MCG tablet 9/28/2022 at Unknown time Self Yes Yes   Sig: Take 100 mcg by mouth every 30 (thirty) days   ezetimibe (ZETIA) 10 mg tablet 9/27/2022 at Unknown time Self Yes Yes   Sig: Take 5 mg by mouth daily   gabapentin (NEURONTIN) 400 mg capsule Not Taking at Unknown time Self Yes No   Sig: Take 400 mg by mouth 3 (three) times a day     Patient not taking: Reported on 9/28/2022   insulin aspart (NovoLOG) 100 units/mL injection 9/28/2022 at Unknown time Self Yes Yes   Sig: Inject 10 Units under the skin 3 (three) times a day with meals   insulin detemir (LEVEMIR) 100 units/mL subcutaneous injection Not Taking at Unknown time Self Yes No   Sig: Inject 70 Units under the skin daily at bedtime     Patient not taking: Reported on 9/29/2022   insulin glargine (Lantus) 100 units/mL subcutaneous injection 9/28/2022 at Unknown time Self Yes Yes   Sig: Inject 38 Units under the skin daily at bedtime   insulin regular (HumuLIN R,NovoLIN R) 100 units/mL injection Not Taking at Unknown time Self Yes No   Sig: Inject 26 Units under the skin 3 (three) times a day with meals     Patient not taking: Reported on 9/28/2022   isosorbide dinitrate (ISORDIL) 20 mg tablet 9/28/2022 at Unknown time Self Yes Yes   Sig: Take 20 mg by mouth 3 (three) times a day   lisinopril (ZESTRIL) 40 mg tablet 9/28/2022 at Unknown time Self Yes Yes   Sig: Take 40 mg by mouth daily after breakfast     metFORMIN (GLUCOPHAGE) 1000 MG tablet 9/28/2022 at Unknown time Self Yes Yes   Sig: Take 1,000 mg by mouth 2 (two) times a day with meals   metoprolol tartrate (LOPRESSOR) 100 mg tablet 9/28/2022 at Unknown time Self Yes Yes   Sig: Take 100 mg by mouth every 12 (twelve) hours   pregabalin (LYRICA) 100 mg capsule 9/28/2022 at Unknown time Self Yes Yes   Sig: Take 100 mg by mouth 3 (three) times a day      Facility-Administered Medications: None     heparin (porcine), 3-20 Units/kg/hr (Order-Specific), Last Rate: 11 1 Units/kg/hr (09/29/22 0726)        Allergies   Allergen Reactions    Amlodipine Lip Swelling       Objective   Vitals: Blood pressure 127/63, pulse (!) 52, temperature 97 5 °F (36 4 °C), resp  rate 18, weight 129 kg (284 lb 6 3 oz), SpO2 95 %  ,     Body mass index is 43 24 kg/m²  ,     Systolic (52WZR), YUP:011 , Min:110 , AKU:683     Diastolic (05IWT), IZQ:36, Min:57, Max:74    Wt Readings from Last 3 Encounters:   09/28/22 129 kg (284 lb 6 3 oz)   11/05/20 129 kg (284 lb)   09/21/20 129 kg (284 lb 3 2 oz)      Lab Results   Component Value Date    CREATININE 1 06 09/29/2022    CREATININE 1 13 09/28/2022    CREATININE 1 00 05/18/2016           No intake or output data in the 24 hours ending 09/29/22 0914  Weight (last 2 days)     Date/Time Weight    09/28/22 1742 129 (284 39)        Invasive Devices  Report    Peripheral Intravenous Line  Duration           Peripheral IV 09/28/22 <1 day                  Physical Exam  Vitals and nursing note reviewed  Constitutional:       General: He is not in acute distress  Appearance: He is well-developed  He is obese  Comments: On RA in NAD   HENT:      Head: Normocephalic and atraumatic  Neck:      Vascular: No JVD  Cardiovascular:      Rate and Rhythm: Normal rate and regular rhythm  Heart sounds: Normal heart sounds  No murmur heard  No friction rub  No gallop  Pulmonary:      Effort: Pulmonary effort is normal  No respiratory distress  Breath sounds: Normal breath sounds  No wheezing or rales  Chest:      Chest wall: No tenderness  Abdominal:      General: Bowel sounds are normal  There is no distension  Palpations: Abdomen is soft  Tenderness: There is no abdominal tenderness  Musculoskeletal:         General: No tenderness  Normal range of motion  Cervical back: Normal range of motion and neck supple  Right lower leg: Edema present  Left lower leg: Edema present     Skin:     General: Skin is warm and dry       Coloration: Skin is not pale  Findings: No erythema  Neurological:      Mental Status: He is alert and oriented to person, place, and time  Psychiatric:         Mood and Affect: Mood normal          Behavior: Behavior normal          Thought Content: Thought content normal          Judgment: Judgment normal            LABORATORY RESULTS:      CBC with diff:   Results from last 7 days   Lab Units 09/29/22  0713 09/28/22  1328   WBC Thousand/uL 11 59* 14 86*   HEMOGLOBIN g/dL 13 2 13 8   HEMATOCRIT % 40 1 43 5   MCV fL 83 84   PLATELETS Thousands/uL 213 232   MCH pg 27 2 26 6*   MCHC g/dL 32 9 31 7   RDW % 17 9* 18 5*   MPV fL 11 6 11 5   NRBC AUTO /100 WBCs 0 0       CMP:  Results from last 7 days   Lab Units 09/29/22  0713 09/28/22  1328   POTASSIUM mmol/L 3 5 3 7   CHLORIDE mmol/L 102 104   CO2 mmol/L 27 27   BUN mg/dL 23 23   CREATININE mg/dL 1 06 1 13   CALCIUM mg/dL 9 1 9 6   AST U/L  --  16   ALT U/L  --  17   ALK PHOS U/L  --  98   EGFR ml/min/1 73sq m 69 64       BMP:  Results from last 7 days   Lab Units 09/29/22  0713 09/28/22  1328   POTASSIUM mmol/L 3 5 3 7   CHLORIDE mmol/L 102 104   CO2 mmol/L 27 27   BUN mg/dL 23 23   CREATININE mg/dL 1 06 1 13   CALCIUM mg/dL 9 1 9 6          No results found for: NTBNP                Results from last 7 days   Lab Units 09/29/22  0038   HEMOGLOBIN A1C % 6 8*              Results from last 7 days   Lab Units 09/28/22  1328   INR  1 39*     Lipid Profile:   No results found for: CHOL  Lab Results   Component Value Date    HDL 25 (L) 09/29/2022     Lab Results   Component Value Date    LDLCALC 27 09/29/2022     Lab Results   Component Value Date    TRIG 114 09/29/2022         Cardiac testing:   No results found for this or any previous visit  No results found for this or any previous visit  No valid procedures specified  No results found for this or any previous visit  Imaging: I have personally reviewed pertinent reports      XR chest 1 view portable    Result Date: 9/29/2022  Narrative: CHEST INDICATION:   chest pain  COMPARISON:  Chest x-ray 1/3/2011 EXAM PERFORMED/VIEWS:  XR CHEST PORTABLE FINDINGS:  There has been prior sternotomy  Cardiomediastinal silhouette appears unremarkable  The lungs are clear  No pneumothorax or pleural effusion  Degenerative spurring at the right shoulder  Impression: No active pulmonary disease  Workstation performed: [id number]     US bedside procedure    Result Date: 9/28/2022  Narrative: 1 2 [id number]  2 446 161 [id number]  1 1      Counseling / Coordination of Care  Total floor / unit time spent today 45 minutes  Greater than 50% of total time was spent with the patient and / or family counseling and / or coordination of care  A description of the counseling / coordination of care: Review of history, current assessment, development of a plan  Code Status: Level 1 - Full Code    ** Please Note: Dragon 360 Dictation voice to text software may have been used in the creation of this document   **

## 2022-09-29 NOTE — PROGRESS NOTES
[facility]  Progress Note - Daniel Gee 1950, 67 y o  male MRN: [id number]  Unit/Bed#: S -01 Encounter: [id number]  Primary Care Provider: Apollo Barlow DO   Date and time admitted to hospital: 9/28/2022  1:41 PM    * Chest pain  Assessment & Plan  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 + 2 stent placements in may ( since then was on eliquis + asa 325mg )  Background of hypertension, hyperlipidemia , obesity, T2DM  CONSUELO score : 6  EKG: no acute ischemic changes, first degree AV block previously reported in Ekg 1/7/14  Troponins  181>226> 295 w/ deltas 45> 114  Heparin gtt and Asa 325 initiated in the Emergency department , he is on daily plavix  At the moment of my encounter AOx3 , Chest pain free , stable bp , bradycardic 50's range  Cardiology Recommendations: Echocardiogram for heart and valvular function, Orthostatic blood pressures, Lopressor dose decreased to 50 mg BID as patient is bradycardic in 50's  Nuclear stress test     Plan:  Continue Telemetry monitoring  Follow up on Nuclear stress test read  Follow up on Echo findings  Continue heparin gtt  I will consider restarting Eliquis tomorrow  Continue aspirin and plavix   Will continue metoprolol at 50 mg, hold parameters HR <50            Type 2 diabetes mellitus [facility])  Assessment & Plan  Lab Results   Component Value Date    HGBA1C 6 8 (H) 09/29/2022       Recent Labs     09/28/22  1947 09/28/22  2115 09/29/22  0408 09/29/22  0506   POCGLU 90 127 59* 97       Continue home regimen: lantus 30 u bedtime regular 10 tid  Carbohydrate control diet   hypoglicemia protocol  bs goal 140-180    Hypertension  Assessment & Plan  BP systolic ranging from 538-702 and diastolic 31-36  Continue losartan  Continue metoprolol      Hyperlipidemia  Assessment & Plan  Lipid profile:  Cholesterol: 75, Triglycerides 114, HDL 25, LDL 27  Continue atorvastatin  Leukocytosis  Assessment & Plan  Wbc 14 86  Possibly reactive  WBC trended down to 11 59  Will Continue to monitor  Hypothyroidism  Assessment & Plan  Continue levothyroxine  Obesity  Assessment & Plan  Educate and encourage life style modifications  VTE Pharmacologic Prophylaxis: VTE Score: 5 High Risk (Score >/= 5) - Pharmacological DVT Prophylaxis Ordered: heparin drip  Sequential Compression Devices Ordered  Patient Centered Rounds: I performed bedside rounds with nursing staff today  Discussions with Specialists or Other Care Team Provider: Cardiology    Education and Discussions with Family / Patient: Updated  (wife) at bedside  Current Length of Stay: 1 day(s)  Current Patient Status: Inpatient   Discharge Plan: Anticipate discharge in 24-48 hrs to home  Code Status: Level 1 - Full Code    Subjective:   Patient states he has no further episodes of chest pain/pressure  Objective:     Vitals:   Temp (24hrs), Av 4 °F (36 3 °C), Min:96 2 °F (35 7 °C), Max:98 2 °F (36 8 °C)    Temp:  [96 2 °F (35 7 °C)-98 2 °F (36 8 °C)] 96 2 °F (35 7 °C)  HR:  [50-59] 51  Resp:  [16-18] 18  BP: (104-176)/(47-74) 120/47  SpO2:  [93 %-99 %] 93 %  Body mass index is 43 18 kg/m²  Input and Output Summary (last 24 hours):   No intake or output data in the 24 hours ending 22 1117    Physical Exam:   Physical Exam  Constitutional:       General: He is not in acute distress  Appearance: He is obese  He is not ill-appearing  HENT:      Nose: No congestion or rhinorrhea  Eyes:      General: No scleral icterus  Right eye: No discharge  Left eye: No discharge  Extraocular Movements: Extraocular movements intact  Conjunctiva/sclera: Conjunctivae normal    Cardiovascular:      Rate and Rhythm: Normal rate and regular rhythm  Pulses: Normal pulses  Heart sounds: Normal heart sounds     Pulmonary: Effort: Pulmonary effort is normal  No respiratory distress  Breath sounds: Normal breath sounds  No stridor  No wheezing or rhonchi  Abdominal:      General: Bowel sounds are normal  There is no distension  Palpations: Abdomen is soft  There is no mass  Tenderness: There is no abdominal tenderness  Musculoskeletal:      Right lower leg: No edema  Left lower leg: No edema  Skin:     General: Skin is warm  Capillary Refill: Capillary refill takes less than 2 seconds  Coloration: Skin is not jaundiced  Findings: No bruising  Neurological:      General: No focal deficit present  Mental Status: He is alert and oriented to person, place, and time  Mental status is at baseline  Cranial Nerves: No cranial nerve deficit  Motor: No weakness  Psychiatric:         Mood and Affect: Mood normal          Behavior: Behavior normal          Thought Content:  Thought content normal          Judgment: Judgment normal           Additional Data:     Labs:  Results from last 7 days   Lab Units 09/29/22  0713   WBC Thousand/uL 11 59*   HEMOGLOBIN g/dL 13 2   HEMATOCRIT % 40 1   PLATELETS Thousands/uL 213   NEUTROS PCT % 71   LYMPHS PCT % 19   MONOS PCT % 8   EOS PCT % 2     Results from last 7 days   Lab Units 09/29/22  0713 09/28/22  1328   SODIUM mmol/L 135 137   POTASSIUM mmol/L 3 5 3 7   CHLORIDE mmol/L 102 104   CO2 mmol/L 27 27   BUN mg/dL 23 23   CREATININE mg/dL 1 06 1 13   ANION GAP mmol/L 6 6   CALCIUM mg/dL 9 1 9 6   ALBUMIN g/dL  --  3 6   TOTAL BILIRUBIN mg/dL  --  0 93   ALK PHOS U/L  --  98   ALT U/L  --  17   AST U/L  --  16   GLUCOSE RANDOM mg/dL 82 79     Results from last 7 days   Lab Units 09/28/22  1328   INR  1 39*     Results from last 7 days   Lab Units 09/29/22  0506 09/29/22  0408 09/28/22  2115 09/28/22  1947 09/28/22  1916   POC GLUCOSE mg/dl 97 59* 127 90 67     Results from last 7 days   Lab Units 09/29/22  0038   HEMOGLOBIN A1C % 6 8* Lines/Drains:  Invasive Devices  Report    Peripheral Intravenous Line  Duration           Peripheral IV 09/28/22 1 day                  Telemetry:  Telemetry Orders (From admission, onward)             48 Hour Telemetry Monitoring  Continuous x 48 hours        References:    Telemetry Guidelines   Question:  Reason for 48 Hour Telemetry  Answer:  Acute MI, chest pain - R/O MI, or unstable angina                 Telemetry Reviewed: Normal Sinus Rhythm  Indication for Continued Telemetry Use: Acute MI/Unstable Angina/Rule out ACS             Imaging: Reviewed radiology reports from this admission including: chest xray    Recent Cultures (last 7 days):         Last 24 Hours Medication List:   Current Facility-Administered Medications   Medication Dose Route Frequency Provider Last Rate    acetaminophen  650 mg Oral Q6H PRN Radha Panda MD      aspirin  81 mg Oral Daily Radha Panda MD      atorvastatin  80 mg Oral HS Arabella Aponte MD      calcium carbonate-vitamin D  2 tablet Oral BID MD Marcelina Mac ON 9/30/2022] chlorthalidone  12 5 mg Oral Early Morning Jihan Lemus MD      cholecalciferol  2,000 Units Oral Daily Arabella Aponte MD      clonazePAM  0 5 mg Oral BID Arabella Aponte MD      clopidogrel  75 mg Oral Daily Arabella Aponte MD      cyanocobalamin  100 mcg Oral Q30 Days Arabella Aponte MD      ezetimibe  5 mg Oral Daily Arabella Aponte MD      gabapentin  400 mg Oral TID Arabella Aponte MD      heparin (porcine)  3-20 Units/kg/hr (Order-Specific) Intravenous Titrated Shari Webb DO 11 1 Units/kg/hr (09/29/22 0726)    insulin detemir  38 Units Subcutaneous HS Kerwin Luna MD      insulin regular  10 Units Subcutaneous TID With Meals Kerwin Luna MD      isosorbide dinitrate  20 mg Oral TID Arabella Aponte MD      levothyroxine  88 mcg Oral Early Morning Arabella Aponte MD      lisinopril  40 mg Oral After Alfonso Salas MD  metoprolol tartrate  50 mg Oral Q12H [facility] CALLIE BYRNE      morphine injection  2 mg Intravenous Q4H PRN Gary Shipmana Fraction, MD      nitroglycerin  0 4 mg Sublingual Q5 Min PRN Alpmisael Anderson Fraction, MD      pregabalin  100 mg Oral TID Vicki Reyna MD          Today, Patient Was Seen By: Mago Tolbert    **Please Note: This note may have been constructed using a voice recognition system  **

## 2022-09-29 NOTE — ASSESSMENT & PLAN NOTE
Educate and encourage life style modifications  REASON FOR VISIT:    Chief Complaint   Patient presents with   • Gastro-intestinal Problem     IBS   • Gastro-intestinal Problem     GERD -- better   • Gastro-intestinal Problem     OIC -- better        LAST VISIT WITH ME:  3/27/2019    HISTORY OF PRESENT ILLNESS:    The patient is a pleasant 60 year old female who is being followed by GI for chronic GERD without esophagitis, IBS-mixed predominant, opioid induced constipation. At her last office visit she was started on b.i.d. Amitiza in addition to b.i.d. MiraLAX and Colace 300 mg nightly. Currently she reports taking both Amitiza and MiraLAX b.i.d. and the Colace approximately every 3 days. This is resulting in 3 bowel movements daily with the first stool described as type 1-2 on the [place] stool scale followed by softer bowel movement and eventually a looser stool described as type 5-6 on the [place] stool scale. This is an improvement from every 3-4 day bowel movement. She feels this is much more manageable and controllable. Denies abdominal bloating, abdominal cramping, diarrhea, melena, hematochezia, dyschezia, tenesmus, steatorrhea. She continues on Tylenol No. 3 and oxymorphone for osteoarthritis through her pain management provider at [facility] in [place]. She is aware as long as she is on narcotics she will likely need to continue on this regimen to have a more regular bowel movement pattern.    Reflux symptoms of heartburn have been very stable on high-dose b.i.d. omeprazole, q.i.d. Carafate, nightly Zegerid and as needed Gaviscon. She feels the heartburn has been very well controlled and has not had any breakthrough heartburn for at least one month. When she does have heartburn she will use Gaviscon with quick and immediate relief. Follows recommended antireflux measures. Denies early satiety, nausea, emesis, hematemesis, acid regurgitation, epigastric pain, nocturnal cough, hoarseness, globus sensation, dysphagia, odynophagia. Weight  is stable with no anorexia or unintentional weight loss. Remains morbidly obese.    I have reviewed the patient's medications and allergies, past medical, surgical, social and family history, updating these as appropriate.  See Histories section of the electronic medical record for a display of this information.    PAST MEDICAL HISTORY:    HLD (hyperlipidemia)                                          HTN (hypertension)                                            Esophageal reflux                                             Left elbow pain                                               Shoulder pain, bilateral                                        Comment: R>L    Herniated disc, cervical                                      Herniated vertebral disc                                        Comment: lower back    Personal history of traumatic fracture                          Comment: fell out of deer stand and 10 rib fractures                posteriorly    Occasional numbness/prickling/tingling of fing*                 Comment: bilat hands    Other chronic pain                                              Comment: hamstring injury-buttock pain    IBS (irritable bowel syndrome)                                  Comment: iritable bowel with diarrhea, nausea    COPD (chronic obstructive pulmonary disease) (*                 Comment: is around smokers, and used to smoke    Coronary artery disease                         12/20/13        Comment: 4 vessel heart disease    Diabetes mellitus type II                                       Comment: oral medication, checks blood sugars    Depression                                                    Fatty liver                                                   Colon polyp                                                   Diverticulosis                                                  Comment: mild    Asthma                                                        Obesity                                                        Bilateral carpal tunnel syndrome                              Hammer toe                                                    Hepatomegaly                                                  Seasonal allergies                                            Neuropathy                                                    Hyponatremia                                    2017      PAST SURGICAL HISTORY:    REPAIR OF DORITAERTOE,ONE                         2008     DELIVERY ONLY                          1979     DELIVERY ONLY                          1980    CARPAL TUNNEL RELEASE                                           Comment: bilateral    COLONOSCOPY W/ BIOPSIES                         2012    COLONOSCOPY W/ BIOPSIES                         10/2/2013       Comment:  performed at [facility]    HYSTERECTOMY                                                  STRESS TEST                                     2013       CHOLECYSTECTOMY                                 1980    IR EPIDURAL INJECTION                                           Comment: x2 to lumbar spine    CORONARY ANGIOGRAM - CV                         13        Comment: with Dr. Horne    CABG, ARTERIAL, FOUR+                           2014      ESOPHAGOGASTRODUODENOSCOPY                      2013    FLEXIBLE SIGMOIDOSCOPY BX                       2013    CYST REMOVAL                                                    Comment: foot    COLONOSCOPY                                     10/30/2017      Comment: Repeat in 3 years    ESOPHAGOGASTRODUODENOSCOPY                      2018    Current Outpatient Medications   Medication Sig   • lubiprostone (AMITIZA) 24 MCG capsule Take 1 capsule by mouth 2 times daily (with meals).   • Omeprazole-Sodium Bicarbonate  MG Pack Take nightly and every 12 hours as needed for reflux   • Alum Hydroxide-Mag  Trisilicate (GAVISCON) 80-14.2 MG Chew Tab Take 2 tablets four times daily as needed for acid reflux   • polyethylene glycol (MIRALAX) powder Take 17 g by mouth 2 times daily as needed (constipation).   • ondansetron (ZOFRAN ODT) 8 MG disintegrating tablet Place 1 tablet onto the tongue every 12 hours.   • oxymorphone (OPANA) 5 MG tablet Take 1 tablet by mouth every 8 hours on an empty stomach   • topiramate (TOPAMAX) 25 MG tablet Take 1 tablet by mouth at bedtime.   • fluconazole (DIFLUCAN) 150 MG tablet Take 1 tab day 1, repeat in 72 hours   • glipiZIDE (GLUCOTROL) 5 MG tablet Take 2 tablets by mouth 2 times daily (before meals).   • liraglutide (VICTOZA) 18 MG/3ML pen-injector Titrate up to 1.8 mg per day.   • cyclobenzaprine (FLEXERIL) 10 MG tablet Take 1 tablet by mouth 3 times daily as needed for Muscle spasms.   • pramipexole (MIRAPEX) 0.25 MG tablet Take 1 tablet by mouth at bedtime as needed (restless legs).   • ergocalciferol (DRISDOL) 05377 units capsule Take 1 capsule by mouth 1 day a week.   • promethazine (PHENERGAN) 25 MG tablet Take 1 tablet by mouth every 6 hours as needed for Nausea.   • empagliflozin (JARDIANCE) 10 MG tablet Take 1 tablet by mouth daily (before breakfast).   • nystatin (MYCOSTATIN) 288992 UNIT/GM powder Apply topically to the affected area twice daily until healed and additional 4 days.   • insulin regular human, CONCENTRATED, (HUMULIN R) 500 UNIT/ML injectable solution Inject 170 units breakfast, 140 units lunch, and 130 units supper and same correction scale 5 u: 50 >150.  units   • blood glucose test strip Test blood sugar 4 times daily as directed.    • metoPROLOL tartrate (LOPRESSOR) 50 MG tablet Take one tablet by mouth twice daily   • atorvastatin (LIPITOR) 80 MG tablet Take 1 tablet by mouth daily.   • losartan (COZAAR) 25 MG tablet Take 1 tablet daily   • amitriptyline (ELAVIL) 100 MG tablet Take 1 tablet by mouth daily   • omeprazole (PRILOSEC) 20 MG capsule Take  2 capsules by mouth 2 times daily (before meals).   • fluticasone-salmeterol (ADVAIR DISKUS) 250-50 MCG/DOSE inhaler Inhale 1 puff 2 times daily   • nitroGLYcerin (NITROSTAT) 0.4 MG sublingual tablet PLACE 1 TABLET (0.4 MG) UNDER THE TONGUE EVERY 5 MINUTES AS NEEDED FOR CHEST PAIN. DO NOT EXCEED 3 DOSES IN 15 MINUTES.   • fluticasone (FLONASE) 50 MCG/ACT nasal spray Instill 1 spray in each nostril daily   • Insulin Syringe/Needle U-500 (BD INSULIN SYRINGE U-500) 31G X 6MM 0.5 ML Misc USE THREE TIMES DAILY TO INJECT INSULIN   • traMADol (ULTRAM) 50 MG tablet One tab every 6 hours as needed for pain.   • glucagon 1 MG injection kit Inject 1 mg into the muscle as needed for severe low blood sugar and if unable to swallow   • Insulin Syringe/Needle U-500 31G X 6MM 0.5 ML Misc Use three times daily to inject insulin.   • acetaminophen (TYLENOL) 500 MG tablet Take 1,000 mg by mouth every 6 hours as needed for Pain.   • Docusate Calcium (STOOL SOFTENER PO) Take 1 capsule by mouth daily.   • albuterol (PROAIR HFA) 108 (90 Base) MCG/ACT inhaler [place] 2 puffs every 4 hours as needed   • estradiol (ESTRACE VAGINAL) 0.1 MG/GM vaginal cream place 2 g vaginally 3 times a week   • Multiple Vitamins-Minerals (MULTIVITAMIN PO) Take 1 tablet by mouth daily.   • aspirin 81 MG chewable tablet Chew 1 tablet by mouth daily.   • Probiotic Product (ALIGN) 4 MG CAPS Take 1 capsule by mouth daily.   • guaiFENesin-codeine (GUAIFENESIN AC) 100-10 MG/5ML liquid Take 5 mLs by mouth 3 times daily as needed for Cough.   • acetaminophen-codeine (TYLENOL NO.3) 300-30 MG per tablet Take 1 tablet by mouth 3 times daily.   • zoster vaccine recomb adjuvanted (SHINGRIX) 50 MCG/0.5ML injection Repeat dose in 2 to 6 months (unless 1 dose already given), for a total of 2 doses.     No current facility-administered medications for this visit.        ALLERGIES:   Allergen Reactions   • Peanut - Dietary Use Only RASH and ANAPHYLAXIS   • Levaquin      Heart  raced, restless    • Menthol [Ice Blue] RASH and SWELLING   • Seasonal      congestion       Family History   Problem Relation Age of Onset   • Diabetes Mother 35        retinopathy, foot amputations    • Heart disease Mother         cabg   • Hypertension Mother    • High blood pressure Mother    • Kidney disease Mother    • Cataracts Mother    • Hyperlipidemia Mother    • Migraine Mother    • Cancer, Prostate Father    • Cancer Father         head and neck   • Cancer, Lung Father    • Cancer, Colon Father    • Arthritis Father    • Heart disease Sister    • Diabetes Sister    • High blood pressure Sister    • Heart disease Paternal Grandmother    • Diabetes Paternal Grandmother    • Diabetes Other         maternal grandparent   • Stroke Other         maternal grandparent   • Celiac Disease Other         F/H   • Depression Sister    • Heart disease Sister    • Migraine Daughter        Social History     Socioeconomic History   • Marital status: /Civil Union     Spouse name: Not on file   • Number of children: 2   • Years of education: 12   • Highest education level: Not on file   Occupational History   • Occupation: Unemployed    Social Needs   • Financial resource strain: Not on file   • Food insecurity:     Worry: Not on file     Inability: Not on file   • Transportation needs:     Medical: Not on file     Non-medical: Not on file   Tobacco Use   • Smoking status: Never Smoker   • Smokeless tobacco: Never Used   • Tobacco comment: NECK:  52  CM   Substance and Sexual Activity   • Alcohol use: No     Alcohol/week: 0.0 oz   • Drug use: No   • Sexual activity: Yes     Partners: Male     Comment: Hysterectomy   Lifestyle   • Physical activity:     Days per week: Not on file     Minutes per session: Not on file   • Stress: Not on file   Relationships   • Social connections:     Talks on phone: Not on file     Gets together: Not on file     Attends Sikh service: Not on file     Active member of club  or organization: Not on file     Attends meetings of clubs or organizations: Not on file     Relationship status: Not on file   • Intimate partner violence:     Fear of current or ex partner: Not on file     Emotionally abused: Not on file     Physically abused: Not on file     Forced sexual activity: Not on file   Other Topics Concern   • Not on file   Social History Narrative     2003    Remarried 2009    2 children, 3 stepchildren    Lives in [place].    Does not consume caffeine.       REVIEW OF SYSTEMS:  A complete review of systems was performed and found to be negative except for what was stated in the History of Present Illness.    PHYSICAL EXAM:  Vitals:    Visit Vitals  /62 (BP Location: [facility], Patient Position: Sitting, Cuff Size: Regular)   Pulse 68   Resp 16   Ht 5' 3\" (1.6 m)   Wt 103 kg   BMI 40.21 kg/m²       Constitutional:  Well developed, well nourished. Obese.   Skin:  No jaundice on inspection. Skin is warm and dry on palpation, no diaphoresis.  Eyes:  Normal conjunctivae and lids, sclerae anicteric.  Musculoskeletal: Patient moving all extremities appropriately, no gait imbalance or ataxia.   Psychiatric: Alert and oriented x 3, pleasant and cooperative, in no acute distress. Normal mood and affect, good eye contact.     ASSESSMENT/PLAN:  1. Irritable bowel syndrome with both constipation and diarrhea - appears stable and well controlled.   Continue Amitiza 24 mcg b.i.d.   Continue MiraLAX 17 g b.i.d.   Continue Colace 300 mg nightly as needed    2. Gastroesophageal reflux disease without esophagitis - appears clinically stable and well controlled. Reviewed antireflux measures.   Continue omeprazole 40 mg b.i.d. 30 minutes a.c.   Continue Sucralfate/Carafate 1 g q.i.d. a.c. and h.s.   Continue Zegerid 20 mg q.h.s. and 12 hours p.r.n.   Continue Gaviscon 2 chewable tablets q.i.d. p.r.n.    3. Therapeutic opioid-induced constipation (OIC) - appears stable and well controlled.    Continue Amitiza 24 mcg b.i.d.   Continue MiraLAX 17 g b.i.d.   Continue Colace 300 mg nightly as needed       No orders of the defined types were placed in this encounter.        FOLLOW-UP:  Return in about 4 months (around 9/8/2019), or if symptoms worsen or fail to improve, for GERD, IBS, OIC.

## 2022-09-29 NOTE — ASSESSMENT & PLAN NOTE
Lab Results   Component Value Date    HGBA1C 6 8 (H) 09/29/2022       Recent Labs     09/28/22  1947 09/28/22  2115 09/29/22  0408 09/29/22  0506   POCGLU 90 127 59* 97       Continue home regimen: lantus 30 u bedtime regular 10 tid  Carbohydrate control diet   hypoglicemia protocol  bs goal 140-180

## 2022-09-29 NOTE — PLAN OF CARE
Problem: PAIN - ADULT  Goal: Verbalizes/displays adequate comfort level or baseline comfort level  Description: Interventions:  - Encourage patient to monitor pain and request assistance  - Assess pain using appropriate pain scale  - Administer analgesics based on type and severity of pain and evaluate response  - Implement non-pharmacological measures as appropriate and evaluate response  - Consider cultural and social influences on pain and pain management  - Notify physician/advanced practitioner if interventions unsuccessful or patient reports new pain  Outcome: Progressing     Problem: INFECTION - ADULT  Goal: Absence or prevention of progression during hospitalization  Description: INTERVENTIONS:  - Assess and monitor for signs and symptoms of infection  - Monitor lab/diagnostic results  - Monitor all insertion sites, i e  indwelling lines, tubes, and drains  - Monitor endotracheal if appropriate and nasal secretions for changes in amount and color  - [place] appropriate cooling/warming therapies per order  - Administer medications as ordered  - Instruct and encourage patient and family to use good hand hygiene technique  - Identify and instruct in appropriate isolation precautions for identified infection/condition  Outcome: Progressing  Goal: Absence of fever/infection during neutropenic period  Description: INTERVENTIONS:  - Monitor WBC    Outcome: Progressing     Problem: SAFETY ADULT  Goal: Patient will remain free of falls  Description: INTERVENTIONS:  - Educate patient/family on patient safety including physical limitations  - Instruct patient to call for assistance with activity   - Consult OT/PT to assist with strengthening/mobility   - Keep Call bell within reach  - Keep bed low and locked with side rails adjusted as appropriate  - Keep care items and personal belongings within reach  - Initiate and maintain comfort rounds  - Make Fall Risk Sign visible to staff  - Offer Toileting every  Hours, in advance of need  - Initiate/Maintain alarm  - Obtain necessary fall risk management equipment:   - Apply yellow socks and bracelet for high fall risk patients  - Consider moving patient to room near nurses station  Outcome: Progressing  Goal: Maintain or return to baseline ADL function  Description: INTERVENTIONS:  -  Assess patient's ability to carry out ADLs; assess patient's baseline for ADL function and identify physical deficits which impact ability to perform ADLs (bathing, care of mouth/teeth, toileting, grooming, dressing, etc )  - Assess/evaluate cause of self-care deficits   - Assess range of motion  - Assess patient's mobility; develop plan if impaired  - Assess patient's need for assistive devices and provide as appropriate  - Encourage maximum independence but intervene and supervise when necessary  - Involve family in performance of ADLs  - Assess for home care needs following discharge   - Consider OT consult to assist with ADL evaluation and planning for discharge  - Provide patient education as appropriate  Outcome: Progressing  Goal: Maintains/Returns to pre admission functional level  Description: INTERVENTIONS:  - Perform BMAT or MOVE assessment daily    - Set and communicate daily mobility goal to care team and patient/family/caregiver  - Collaborate with rehabilitation services on mobility goals if consulted  - Perform Range of Motion times a day  - Reposition patient every  hours    - Dangle patient  times a day  - Stand patient  times a day  - Ambulate patient  times a day  - Out of bed to chair  times a day   - Out of bed for meals times a day  - Out of bed for toileting  - Record patient progress and toleration of activity level   Outcome: Progressing     Problem: DISCHARGE PLANNING  Goal: Discharge to home or other facility with appropriate resources  Description: INTERVENTIONS:  - Identify barriers to discharge w/patient and caregiver  - Arrange for needed discharge resources and transportation as appropriate  - Identify discharge learning needs (meds, wound care, etc )  - Arrange for interpretive services to assist at discharge as needed  - Refer to Case Management Department for coordinating discharge planning if the patient needs post-hospital services based on physician/advanced practitioner order or complex needs related to functional status, cognitive ability, or social support system  Outcome: Progressing     Problem: Knowledge Deficit  Goal: Patient/family/caregiver demonstrates understanding of disease process, treatment plan, medications, and discharge instructions  Description: Complete learning assessment and assess knowledge base    Interventions:  - Provide teaching at level of understanding  - Provide teaching via preferred learning methods  Outcome: Progressing

## 2022-09-29 NOTE — ASSESSMENT & PLAN NOTE
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 + 2 stent placements in may ( since then was on eliquis + asa 325mg )  Background of hypertension, hyperlipidemia , obesity, T2DM  CONSUELO score : 6  EKG: no acute ischemic changes, first degree AV block previously reported in Ekg 1/7/14  Troponins  181>226> 295 w/ deltas 45> 114  Heparin gtt and Asa 325 initiated in the Emergency department , he is on daily plavix  At the moment of my encounter AOx3 , Chest pain free , stable bp , bradycardic 50's range  Cardiology Recommendations: Echocardiogram for heart and valvular function, Orthostatic blood pressures, Lopressor dose decreased to 50 mg BID as patient is bradycardic in 50's  Nuclear stress test     Plan:  Continue Telemetry monitoring  Follow up on Nuclear stress test read  Follow up on Echo findings  Continue heparin gtt  I will consider restarting Eliquis tomorrow     Continue aspirin and plavix   Will continue metoprolol at 50 mg, hold parameters HR <50

## 2022-09-30 LAB
2HR DELTA HS TROPONIN: 77 NG/L
4HR DELTA HS TROPONIN: 36 NG/L
ANION GAP SERPL CALCULATED.3IONS-SCNC: 7 MMOL/L (ref 4–13)
APTT PPP: 61 SECONDS (ref 23–37)
ATRIAL RATE: 73 BPM
BASOPHILS # BLD AUTO: 0.05 THOUSANDS/ΜL (ref 0–0.1)
BASOPHILS NFR BLD AUTO: 1 % (ref 0–1)
BUN SERPL-MCNC: 26 MG/DL (ref 5–25)
CALCIUM SERPL-MCNC: 8.8 MG/DL (ref 8.4–10.2)
CARDIAC TROPONIN I PNL SERPL HS: 111 NG/L
CARDIAC TROPONIN I PNL SERPL HS: 152 NG/L
CHLORIDE SERPL-SCNC: 103 MMOL/L (ref 96–108)
CO2 SERPL-SCNC: 26 MMOL/L (ref 21–32)
CREAT SERPL-MCNC: 1.23 MG/DL (ref 0.6–1.3)
EOSINOPHIL # BLD AUTO: 0.29 THOUSAND/ΜL (ref 0–0.61)
EOSINOPHIL NFR BLD AUTO: 3 % (ref 0–6)
ERYTHROCYTE [DISTWIDTH] IN BLOOD BY AUTOMATED COUNT: 17.6 % (ref 11.6–15.1)
GFR SERPL CREATININE-BSD FRML MDRD: 58 ML/MIN/1.73SQ M
GLUCOSE SERPL-MCNC: 155 MG/DL (ref 65–140)
GLUCOSE SERPL-MCNC: 156 MG/DL (ref 65–140)
GLUCOSE SERPL-MCNC: 180 MG/DL (ref 65–140)
GLUCOSE SERPL-MCNC: 86 MG/DL (ref 65–140)
HCT VFR BLD AUTO: 39 % (ref 36.5–49.3)
HGB BLD-MCNC: 12.7 G/DL (ref 12–17)
IMM GRANULOCYTES # BLD AUTO: 0.04 THOUSAND/UL (ref 0–0.2)
IMM GRANULOCYTES NFR BLD AUTO: 0 % (ref 0–2)
LYMPHOCYTES # BLD AUTO: 2.39 THOUSANDS/ΜL (ref 0.6–4.47)
LYMPHOCYTES NFR BLD AUTO: 26 % (ref 14–44)
MCH RBC QN AUTO: 27.1 PG (ref 26.8–34.3)
MCHC RBC AUTO-ENTMCNC: 32.6 G/DL (ref 31.4–37.4)
MCV RBC AUTO: 83 FL (ref 82–98)
MONOCYTES # BLD AUTO: 0.83 THOUSAND/ΜL (ref 0.17–1.22)
MONOCYTES NFR BLD AUTO: 9 % (ref 4–12)
NEUTROPHILS # BLD AUTO: 5.56 THOUSANDS/ΜL (ref 1.85–7.62)
NEUTS SEG NFR BLD AUTO: 61 % (ref 43–75)
NRBC BLD AUTO-RTO: 0 /100 WBCS
P AXIS: 46 DEGREES
PLATELET # BLD AUTO: 194 THOUSANDS/UL (ref 149–390)
PMV BLD AUTO: 11.2 FL (ref 8.9–12.7)
POTASSIUM SERPL-SCNC: 3.5 MMOL/L (ref 3.5–5.3)
PR INTERVAL: 228 MS
QRS AXIS: -51 DEGREES
QRSD INTERVAL: 96 MS
QT INTERVAL: 410 MS
QTC INTERVAL: 451 MS
RBC # BLD AUTO: 4.68 MILLION/UL (ref 3.88–5.62)
SODIUM SERPL-SCNC: 136 MMOL/L (ref 135–147)
T WAVE AXIS: 93 DEGREES
VENTRICULAR RATE: 73 BPM
WBC # BLD AUTO: 9.16 THOUSAND/UL (ref 4.31–10.16)

## 2022-09-30 PROCEDURE — 85730 THROMBOPLASTIN TIME PARTIAL: CPT | Performed by: INTERNAL MEDICINE

## 2022-09-30 PROCEDURE — 84484 ASSAY OF TROPONIN QUANT: CPT

## 2022-09-30 PROCEDURE — 82948 REAGENT STRIP/BLOOD GLUCOSE: CPT

## 2022-09-30 PROCEDURE — 80048 BASIC METABOLIC PNL TOTAL CA: CPT | Performed by: PHYSICIAN ASSISTANT

## 2022-09-30 PROCEDURE — 99232 SBSQ HOSP IP/OBS MODERATE 35: CPT | Performed by: INTERNAL MEDICINE

## 2022-09-30 PROCEDURE — 85025 COMPLETE CBC W/AUTO DIFF WBC: CPT

## 2022-09-30 PROCEDURE — 93010 ELECTROCARDIOGRAM REPORT: CPT | Performed by: INTERNAL MEDICINE

## 2022-09-30 RX ORDER — CHLORTHALIDONE 25 MG/1
12.5 TABLET ORAL
Status: CANCELLED | OUTPATIENT
Start: 2022-10-01

## 2022-09-30 RX ORDER — ACETAMINOPHEN 325 MG/1
650 TABLET ORAL EVERY 6 HOURS PRN
Status: CANCELLED | OUTPATIENT
Start: 2022-09-30

## 2022-09-30 RX ORDER — GABAPENTIN 400 MG/1
400 CAPSULE ORAL 3 TIMES DAILY
Status: CANCELLED | OUTPATIENT
Start: 2022-09-30

## 2022-09-30 RX ORDER — ATORVASTATIN CALCIUM 40 MG/1
80 TABLET, FILM COATED ORAL
Status: CANCELLED | OUTPATIENT
Start: 2022-09-30

## 2022-09-30 RX ORDER — LEVOTHYROXINE SODIUM 88 UG/1
88 TABLET ORAL
Status: CANCELLED | OUTPATIENT
Start: 2022-10-01

## 2022-09-30 RX ORDER — NITROGLYCERIN 0.4 MG/1
0.4 TABLET SUBLINGUAL
Status: CANCELLED | OUTPATIENT
Start: 2022-09-30

## 2022-09-30 RX ORDER — ASPIRIN 81 MG/1
81 TABLET ORAL DAILY
Status: CANCELLED | OUTPATIENT
Start: 2022-10-01

## 2022-09-30 RX ORDER — MELATONIN
2000 DAILY
Status: CANCELLED | OUTPATIENT
Start: 2022-10-01

## 2022-09-30 RX ORDER — ISOSORBIDE DINITRATE 10 MG/1
20 TABLET ORAL 3 TIMES DAILY
Status: CANCELLED | OUTPATIENT
Start: 2022-09-30

## 2022-09-30 RX ORDER — PREGABALIN 100 MG/1
100 CAPSULE ORAL 3 TIMES DAILY
Status: CANCELLED | OUTPATIENT
Start: 2022-09-30

## 2022-09-30 RX ORDER — HEPARIN SODIUM 10000 [USP'U]/100ML
3-20 INJECTION, SOLUTION INTRAVENOUS
Status: CANCELLED | OUTPATIENT
Start: 2022-09-30

## 2022-09-30 RX ORDER — EZETIMIBE 10 MG/1
5 TABLET ORAL DAILY
Status: CANCELLED | OUTPATIENT
Start: 2022-10-01

## 2022-09-30 RX ORDER — LISINOPRIL 20 MG/1
40 TABLET ORAL
Status: CANCELLED | OUTPATIENT
Start: 2022-10-01

## 2022-09-30 RX ORDER — CLONAZEPAM 0.5 MG/1
0.5 TABLET ORAL 2 TIMES DAILY
Status: CANCELLED | OUTPATIENT
Start: 2022-09-30

## 2022-09-30 RX ORDER — CLOPIDOGREL BISULFATE 75 MG/1
75 TABLET ORAL DAILY
Status: CANCELLED | OUTPATIENT
Start: 2022-10-01

## 2022-09-30 RX ORDER — METOPROLOL TARTRATE 50 MG/1
50 TABLET, FILM COATED ORAL EVERY 12 HOURS SCHEDULED
Status: CANCELLED | OUTPATIENT
Start: 2022-09-30

## 2022-09-30 RX ADMIN — ISOSORBIDE DINITRATE 20 MG: 10 TABLET ORAL at 22:17

## 2022-09-30 RX ADMIN — EZETIMIBE 5 MG: 10 TABLET ORAL at 09:03

## 2022-09-30 RX ADMIN — GABAPENTIN 400 MG: 400 CAPSULE ORAL at 09:05

## 2022-09-30 RX ADMIN — CLONAZEPAM 0.5 MG: 0.5 TABLET ORAL at 17:17

## 2022-09-30 RX ADMIN — CHLORTHALIDONE 12.5 MG: 25 TABLET ORAL at 05:07

## 2022-09-30 RX ADMIN — METOPROLOL TARTRATE 50 MG: 50 TABLET, FILM COATED ORAL at 09:03

## 2022-09-30 RX ADMIN — ATORVASTATIN CALCIUM 80 MG: 40 TABLET, FILM COATED ORAL at 22:18

## 2022-09-30 RX ADMIN — LISINOPRIL 40 MG: 20 TABLET ORAL at 09:05

## 2022-09-30 RX ADMIN — CLONAZEPAM 0.5 MG: 0.5 TABLET ORAL at 09:06

## 2022-09-30 RX ADMIN — ISOSORBIDE DINITRATE 20 MG: 10 TABLET ORAL at 15:37

## 2022-09-30 RX ADMIN — Medication 2 TABLET: at 09:05

## 2022-09-30 RX ADMIN — PREGABALIN 100 MG: 100 CAPSULE ORAL at 22:18

## 2022-09-30 RX ADMIN — HEPARIN SODIUM 11.1 UNITS/KG/HR: 10000 INJECTION, SOLUTION INTRAVENOUS at 20:33

## 2022-09-30 RX ADMIN — PREGABALIN 100 MG: 100 CAPSULE ORAL at 09:03

## 2022-09-30 RX ADMIN — Medication 2000 UNITS: at 09:03

## 2022-09-30 RX ADMIN — Medication 2 TABLET: at 17:18

## 2022-09-30 RX ADMIN — LEVOTHYROXINE SODIUM 88 MCG: 88 TABLET ORAL at 05:10

## 2022-09-30 RX ADMIN — PREGABALIN 100 MG: 100 CAPSULE ORAL at 15:38

## 2022-09-30 RX ADMIN — ISOSORBIDE DINITRATE 20 MG: 10 TABLET ORAL at 09:04

## 2022-09-30 RX ADMIN — SODIUM CHLORIDE 100 ML/HR: 0.9 INJECTION, SOLUTION INTRAVENOUS at 05:10

## 2022-09-30 RX ADMIN — ASPIRIN 81 MG CHEWABLE TABLET 324 MG: 81 TABLET CHEWABLE at 05:07

## 2022-09-30 RX ADMIN — METOPROLOL TARTRATE 50 MG: 50 TABLET, FILM COATED ORAL at 22:18

## 2022-09-30 RX ADMIN — INSULIN DETEMIR 20 UNITS: 100 INJECTION, SOLUTION SUBCUTANEOUS at 22:17

## 2022-09-30 RX ADMIN — INSULIN HUMAN 7 UNITS: 100 INJECTION, SOLUTION PARENTERAL at 17:17

## 2022-09-30 RX ADMIN — ASPIRIN 81 MG: 81 TABLET, COATED ORAL at 09:04

## 2022-09-30 RX ADMIN — VITAM B12 100 MCG: 100 TAB at 22:21

## 2022-09-30 RX ADMIN — CLOPIDOGREL BISULFATE 75 MG: 75 TABLET ORAL at 09:03

## 2022-09-30 NOTE — PROGRESS NOTES
[facility]  Progress Note - Shahana Douglas 1950, 67 y o  male MRN: [id number]  Unit/Bed#: S -01 Encounter: [id number]  Primary Care Provider: Isaiah Malave DO   Date and time admitted to hospital: 9/28/2022  1:41 PM    * Chest pain  Assessment & Plan  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 + 2 stent placements in may ( since then was on eliquis + asa 325mg )  Background of hypertension, hyperlipidemia , obesity, T2DM  CONSUELO score : 6  EKG: no acute ischemic changes, first degree AV block previously reported in Ekg 1/7/14  Troponins  181>226> 295 w/ deltas 45> 114  Heparin gtt and Asa 325 initiated in the Emergency department , he is on daily plavix  At the moment of my encounter AOx3 , Chest pain free , stable bp , bradycardic 50's range  Cardiology Recommendations: Echocardiogram for heart and valvular function, Orthostatic blood pressures, Lopressor dose decreased to 50 mg BID as patient is bradycardic in 50's  Echocardiogram:  Shows ejection fraction of 60% with normal systolic function and wall motion  Mildly abnormal diastolic function  Mild mitral valve regurgitation and pulmonic valve regurgitation  Nuclear stress test: "Perfusion: There is a left ventricular perfusion defect that is overall large in size and moderate in severity involving the basal to mid anterolateral and inferolateral walls  There is also mild reversibility involving the anterior wall and apex "    Patient has an episode of chest pain overnight which resolved after took sublingual nitroglycerin x2  As per cardiology patient's anatomy and prior interventions makes him high risk and will be transferred to [facility] for cardiac catheterization  Plan:  Patient will be transferred to [facility] for cardiac catheterization  Continue Telemetry monitoring    Continue heparin gtt   Continue aspirin and plavix   Will continue metoprolol at 50 mg, hold parameters HR <50    Type 2 diabetes mellitus [facility])  Assessment & Plan  Lab Results   Component Value Date    HGBA1C 6 8 (H) 2022       Recent Labs     22  1742 22  1837 22  2339 22  1715   POCGLU 65 160* 156* 180*       Continue home regimen: lantus 30 u bedtime regular 10 tid  Carbohydrate control diet   hypoglicemia protocol  bs goal 140-180    Hypertension  Assessment & Plan  BP systolic ranging from 382-816 and diastolic 07-63  Continue losartan  Continue metoprolol  Hyperlipidemia  Assessment & Plan  Lipid profile:  Cholesterol: 75, Triglycerides 114, HDL 25, LDL 27  Continue atorvastatin  Leukocytosis  Assessment & Plan  Wbc 14 86  Possibly reactive  Leukocytosis resolved, WBC is 9 6 today  Will Continue to monitor  Hypothyroidism  Assessment & Plan  Continue levothyroxine 88 mcg daily  Obesity  Assessment & Plan  Educate and encourage life style modifications  VTE Pharmacologic Prophylaxis: VTE Score: 5 High Risk (Score >/= 5) - Pharmacological DVT Prophylaxis Ordered: heparin drip  Sequential Compression Devices Ordered  Patient Centered Rounds: I performed bedside rounds with nursing staff today  Discussions with Specialists or Other Care Team Provider:  Cardiology    Education and Discussions with Family / Patient: Updated  (wife) at bedside  Current Length of Stay: 2 day(s)  Current Patient Status: Inpatient   Discharge Plan: Transfer to [facility]    Code Status: Level 1 - Full Code    Subjective:   Patient states he had chest pain overnight but is feeling low are good this morning I blood the  No more chest pain this morning      Objective:     Vitals:   Temp (24hrs), Av 9 °F (36 6 °C), Min:97 9 °F (36 6 °C), Max:97 9 °F (36 6 °C)    Temp:  [97 9 °F (36 6 °C)] 97 9 °F (36 6 °C)  HR:  [52-75] 68  Resp:  [16-18] 16  BP: (116-172)/(59-97) 125/68  SpO2: [94 %-99 %] 96 %  Body mass index is 43 18 kg/m²  Input and Output Summary (last 24 hours):   No intake or output data in the 24 hours ending 09/30/22 7622    Physical Exam:   Physical Exam  Vitals reviewed  Constitutional:       General: He is not in acute distress  Appearance: He is obese  He is not ill-appearing  HENT:      Nose: No congestion or rhinorrhea  Eyes:      General: No scleral icterus  Right eye: No discharge  Left eye: No discharge  Extraocular Movements: Extraocular movements intact  Conjunctiva/sclera: Conjunctivae normal    Cardiovascular:      Rate and Rhythm: Normal rate and regular rhythm  Pulses: Normal pulses  Heart sounds: Normal heart sounds  Pulmonary:      Effort: Pulmonary effort is normal  No respiratory distress  Breath sounds: Normal breath sounds  No stridor  No wheezing or rhonchi  Abdominal:      General: Bowel sounds are normal  There is no distension  Palpations: Abdomen is soft  There is no mass  Tenderness: There is no abdominal tenderness  Musculoskeletal:      Right lower leg: No edema  Left lower leg: No edema  Skin:     General: Skin is warm  Capillary Refill: Capillary refill takes less than 2 seconds  Coloration: Skin is not jaundiced  Findings: No bruising  Neurological:      General: No focal deficit present  Mental Status: He is alert and oriented to person, place, and time  Mental status is at baseline  Cranial Nerves: No cranial nerve deficit  Motor: No weakness  Psychiatric:         Mood and Affect: Mood normal          Behavior: Behavior normal          Thought Content:  Thought content normal          Judgment: Judgment normal           Additional Data:     Labs:  Results from last 7 days   Lab Units 09/30/22  0520   WBC Thousand/uL 9 16   HEMOGLOBIN g/dL 12 7   HEMATOCRIT % 39 0   PLATELETS Thousands/uL 194   NEUTROS PCT % 61   LYMPHS PCT % 26   MONOS PCT % 9   EOS PCT % 3     Results from last 7 days   Lab Units 09/30/22  0520 09/29/22  0713 09/28/22  1328   SODIUM mmol/L 136   < > 137   POTASSIUM mmol/L 3 5   < > 3 7   CHLORIDE mmol/L 103   < > 104   CO2 mmol/L 26   < > 27   BUN mg/dL 26*   < > 23   CREATININE mg/dL 1 23   < > 1 13   ANION GAP mmol/L 7   < > 6   CALCIUM mg/dL 8 8   < > 9 6   ALBUMIN g/dL  --   --  3 6   TOTAL BILIRUBIN mg/dL  --   --  0 93   ALK PHOS U/L  --   --  98   ALT U/L  --   --  17   AST U/L  --   --  16   GLUCOSE RANDOM mg/dL 86   < > 79    < > = values in this interval not displayed       Results from last 7 days   Lab Units 09/28/22  1328   INR  1 39*     Results from last 7 days   Lab Units 09/30/22  1715 09/29/22  2339 09/29/22  1837 09/29/22  1742 09/29/22  0506 09/29/22  0408 09/28/22  2115 09/28/22  1947 09/28/22  1916   POC GLUCOSE mg/dl 180* 156* 160* 65 97 59* 127 90 67     Results from last 7 days   Lab Units 09/29/22  0038   HEMOGLOBIN A1C % 6 8*           Lines/Drains:  Invasive Devices  Report    Peripheral Intravenous Line  Duration           Peripheral IV 09/28/22 2 days                  Telemetry:  Telemetry Orders (From admission, onward)             48 Hour Telemetry Monitoring  Continuous x 48 hours        Expiring   References:    Telemetry Guidelines   Question:  Reason for 48 Hour Telemetry  Answer:  Acute MI, chest pain - R/O MI, or unstable angina                 Telemetry Reviewed: Normal Sinus Rhythm  Indication for Continued Telemetry Use: Acute MI/Unstable Angina/Rule out ACS             Imaging: Reviewed radiology reports from this admission including: chest xray and ECHO    Recent Cultures (last 7 days):         Last 24 Hours Medication List:   Current Facility-Administered Medications   Medication Dose Route Frequency Provider Last Rate    acetaminophen  650 mg Oral Q6H PRN Gary Feliciano MD      aspirin  81 mg Oral Daily Gary Feliciano MD      aspirin  324 mg Oral Once [facility] Tyesha Vizcaino PA-C      atorvastatin  80 mg Oral HS Vee See MD      calcium carbonate-vitamin D  2 tablet Oral BID Vee See MD      chlorthalidone  12 5 mg Oral Early Morning Irma Marinelli MD      cholecalciferol  2,000 Units Oral Daily Vee See MD      clonazePAM  0 5 mg Oral BID Vee See MD      clopidogrel  75 mg Oral Daily Vee See MD      cyanocobalamin  100 mcg Oral Q30 Days Vee See MD      ezetimibe  5 mg Oral Daily Vee See MD      gabapentin  400 mg Oral TID Vee See MD      heparin (porcine)  3-20 Units/kg/hr (Order-Specific) Intravenous Titrated Espinoza GoveaDO nury 11 1 Units/kg/hr (09/29/22 1747)    insulin detemir  20 Units Subcutaneous HS Fatmata Panda MD      insulin regular  7 Units Subcutaneous TID With Meals Gary Dyer MD      isosorbide dinitrate  20 mg Oral TID Vee See MD      levothyroxine  88 mcg Oral Early Morning Vee See MD      lisinopril  40 mg Oral After Breakfast Vee See MD      metoprolol tartrate  50 mg Oral Q12H [address] [facility] [address]      morphine injection  2 mg Intravenous Q4H PRN Gary Dyer MD      nitroglycerin  0 4 mg Sublingual Q5 Min PRN Fatmata Panda MD      pregabalin  100 mg Oral TID Vee See MD          Today, Patient Was Seen By: Vanesa Harding    **Please Note: This note may have been constructed using a voice recognition system  **

## 2022-09-30 NOTE — PLAN OF CARE
Problem: PAIN - ADULT  Goal: Verbalizes/displays adequate comfort level or baseline comfort level  Description: Interventions:  - Encourage patient to monitor pain and request assistance  - Assess pain using appropriate pain scale  - Administer analgesics based on type and severity of pain and evaluate response  - Implement non-pharmacological measures as appropriate and evaluate response  - Consider cultural and social influences on pain and pain management  - Notify physician/advanced practitioner if interventions unsuccessful or patient reports new pain  Outcome: Progressing     Problem: INFECTION - ADULT  Goal: Absence or prevention of progression during hospitalization  Description: INTERVENTIONS:  - Assess and monitor for signs and symptoms of infection  - Monitor lab/diagnostic results  - Monitor all insertion sites, i e  indwelling lines, tubes, and drains  - Monitor endotracheal if appropriate and nasal secretions for changes in amount and color  - [place] appropriate cooling/warming therapies per order  - Administer medications as ordered  - Instruct and encourage patient and family to use good hand hygiene technique  - Identify and instruct in appropriate isolation precautions for identified infection/condition  Outcome: Progressing  Goal: Absence of fever/infection during neutropenic period  Description: INTERVENTIONS:  - Monitor WBC    Outcome: Progressing     Problem: Knowledge Deficit  Goal: Patient/family/caregiver demonstrates understanding of disease process, treatment plan, medications, and discharge instructions  Description: Complete learning assessment and assess knowledge base    Interventions:  - Provide teaching at level of understanding  - Provide teaching via preferred learning methods  Outcome: Progressing     Problem: Potential for Falls  Goal: Patient will remain free of falls  Description: INTERVENTIONS:  - Educate patient/family on patient safety including physical limitations  - Instruct patient to call for assistance with activity   - Consult OT/PT to assist with strengthening/mobility   - Keep Call bell within reach  - Keep bed low and locked with side rails adjusted as appropriate  - Keep care items and personal belongings within reach  - Initiate and maintain comfort rounds  - Make Fall Risk Sign visible to staff  - Offer Toileting every 2 Hours, in advance of need  - Initiate/Maintain bed and chair alarm  - Obtain necessary fall risk management equipment  - Apply yellow socks and bracelet for high fall risk patients  - Consider moving patient to room near nurses station  Outcome: Progressing     Problem: CARDIOVASCULAR - ADULT  Goal: Maintains optimal cardiac output and hemodynamic stability  Description: INTERVENTIONS:  - Monitor I/O, vital signs and rhythm  - Monitor for S/S and trends of decreased cardiac output  - Administer and titrate ordered vasoactive medications to optimize hemodynamic stability  - Assess quality of pulses, skin color and temperature  - Assess for signs of decreased coronary artery perfusion  - Instruct patient to report change in severity of symptoms  Outcome: Progressing  Goal: Absence of cardiac dysrhythmias or at baseline rhythm  Description: INTERVENTIONS:  - Continuous cardiac monitoring, vital signs, obtain 12 lead EKG if ordered  - Administer antiarrhythmic and heart rate control medications as ordered  - Monitor electrolytes and administer replacement therapy as ordered  Outcome: Progressing

## 2022-09-30 NOTE — ASSESSMENT & PLAN NOTE
Lab Results   Component Value Date    HGBA1C 6 8 (H) 09/29/2022       Recent Labs     09/29/22  1742 09/29/22  1837 09/29/22  2339 09/30/22  1715   POCGLU 65 160* 156* 180*       Continue home regimen: lantus 30 u bedtime regular 10 tid  Carbohydrate control diet   hypoglicemia protocol  bs goal 140-180

## 2022-09-30 NOTE — PLAN OF CARE
Problem: PAIN - ADULT  Goal: Verbalizes/displays adequate comfort level or baseline comfort level  Description: Interventions:  - Encourage patient to monitor pain and request assistance  - Assess pain using appropriate pain scale  - Administer analgesics based on type and severity of pain and evaluate response  - Implement non-pharmacological measures as appropriate and evaluate response  - Consider cultural and social influences on pain and pain management  - Notify physician/advanced practitioner if interventions unsuccessful or patient reports new pain  Outcome: Progressing     Problem: INFECTION - ADULT  Goal: Absence or prevention of progression during hospitalization  Description: INTERVENTIONS:  - Assess and monitor for signs and symptoms of infection  - Monitor lab/diagnostic results  - Monitor all insertion sites, i e  indwelling lines, tubes, and drains  - Monitor endotracheal if appropriate and nasal secretions for changes in amount and color  - [place] appropriate cooling/warming therapies per order  - Administer medications as ordered  - Instruct and encourage patient and family to use good hand hygiene technique  - Identify and instruct in appropriate isolation precautions for identified infection/condition  Outcome: Progressing  Goal: Absence of fever/infection during neutropenic period  Description: INTERVENTIONS:  - Monitor WBC    Outcome: Progressing     Problem: SAFETY ADULT  Goal: Patient will remain free of falls  Description: INTERVENTIONS:  - Educate patient/family on patient safety including physical limitations  - Instruct patient to call for assistance with activity   - Consult OT/PT to assist with strengthening/mobility   - Keep Call bell within reach  - Keep bed low and locked with side rails adjusted as appropriate  - Keep care items and personal belongings within reach  - Initiate and maintain comfort rounds  - Make Fall Risk Sign visible to staff  - Offer Toileting every  Hours, in advance of need  - Initiate/Maintain alarm  - Obtain necessary fall risk management equipment:   - Apply yellow socks and bracelet for high fall risk patients  - Consider moving patient to room near nurses station  Outcome: Progressing  Goal: Maintain or return to baseline ADL function  Description: INTERVENTIONS:  -  Assess patient's ability to carry out ADLs; assess patient's baseline for ADL function and identify physical deficits which impact ability to perform ADLs (bathing, care of mouth/teeth, toileting, grooming, dressing, etc )  - Assess/evaluate cause of self-care deficits   - Assess range of motion  - Assess patient's mobility; develop plan if impaired  - Assess patient's need for assistive devices and provide as appropriate  - Encourage maximum independence but intervene and supervise when necessary  - Involve family in performance of ADLs  - Assess for home care needs following discharge   - Consider OT consult to assist with ADL evaluation and planning for discharge  - Provide patient education as appropriate  Outcome: Progressing  Goal: Maintains/Returns to pre admission functional level  Description: INTERVENTIONS:  - Perform BMAT or MOVE assessment daily    - Set and communicate daily mobility goal to care team and patient/family/caregiver  - Collaborate with rehabilitation services on mobility goals if consulted  - Perform Range of Motion  times a day  - Reposition patient every  hours    - Dangle patient  times a day  - Stand patient  times a day  - Ambulate patient  times a day  - Out of bed to chair  times a day   - Out of bed for meals times a day  - Out of bed for toileting  - Record patient progress and toleration of activity level   Outcome: Progressing     Problem: DISCHARGE PLANNING  Goal: Discharge to home or other facility with appropriate resources  Description: INTERVENTIONS:  - Identify barriers to discharge w/patient and caregiver  - Arrange for needed discharge resources and transportation as appropriate  - Identify discharge learning needs (meds, wound care, etc )  - Arrange for interpretive services to assist at discharge as needed  - Refer to Case Management Department for coordinating discharge planning if the patient needs post-hospital services based on physician/advanced practitioner order or complex needs related to functional status, cognitive ability, or social support system  Outcome: Progressing     Problem: Knowledge Deficit  Goal: Patient/family/caregiver demonstrates understanding of disease process, treatment plan, medications, and discharge instructions  Description: Complete learning assessment and assess knowledge base    Interventions:  - Provide teaching at level of understanding  - Provide teaching via preferred learning methods  Outcome: Progressing     Problem: Potential for Falls  Goal: Patient will remain free of falls  Description: INTERVENTIONS:  - Educate patient/family on patient safety including physical limitations  - Instruct patient to call for assistance with activity   - Consult OT/PT to assist with strengthening/mobility   - Keep Call bell within reach  - Keep bed low and locked with side rails adjusted as appropriate  - Keep care items and personal belongings within reach  - Initiate and maintain comfort rounds  - Make Fall Risk Sign visible to staff  - Offer Toileting every  Hours, in advance of need  - Initiate/Maintain alarm  - Obtain necessary fall risk management equipment:   - Apply yellow socks and bracelet for high fall risk patients  - Consider moving patient to room near nurses station  Outcome: Progressing

## 2022-09-30 NOTE — ASSESSMENT & PLAN NOTE
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 + 2 stent placements in may ( since then was on eliquis + asa 325mg )  Background of hypertension, hyperlipidemia , obesity, T2DM  CONSUELO score : 6  EKG: no acute ischemic changes, first degree AV block previously reported in Ekg 1/7/14  Troponins  181>226> 295 w/ deltas 45> 114  Heparin gtt and Asa 325 initiated in the Emergency department , he is on daily plavix  At the moment of my encounter AOx3 , Chest pain free , stable bp , bradycardic 50's range  Cardiology Recommendations: Echocardiogram for heart and valvular function, Orthostatic blood pressures, Lopressor dose decreased to 50 mg BID as patient is bradycardic in 50's  Echocardiogram:  Shows ejection fraction of 60% with normal systolic function and wall motion  Mildly abnormal diastolic function  Mild mitral valve regurgitation and pulmonic valve regurgitation  Nuclear stress test: "Perfusion: There is a left ventricular perfusion defect that is overall large in size and moderate in severity involving the basal to mid anterolateral and inferolateral walls  There is also mild reversibility involving the anterior wall and apex "    Patient has an episode of chest pain overnight which resolved after took sublingual nitroglycerin x2  As per cardiology patient's anatomy and prior interventions makes him high risk and will be transferred to [place] for cardiac catheterization  Plan:  Patient will be transferred to [facility] for cardiac catheterization  Continue Telemetry monitoring    Continue heparin gtt   Continue aspirin and plavix   Will continue metoprolol at 50 mg, hold parameters HR <50

## 2022-09-30 NOTE — PROGRESS NOTES
General Cardiology   Progress Note -  Team One   [place] 67 y o  male MRN: [id number]    Unit/Bed#: S -01 Encounter: [id number]    Assessment:    1  UA:  Presented with squeezing 7/10substernal chest pain with associated belching, lightheadedness and blurry vision  Had moderate improvement in pain after taking SLN  Pain lasted several hours before resolving  This was different than his typical anginal pain  EKG with no acute ischemic change  HS troponin 181--> 226--> 295  Patient received 325 mg of aspirin was started on heparin infusion  · Nuclear stress test yesterday revealed basal-mid anterior wall and mid inferior wall ischemia  · Patient had recurrent sharp 10/10 chest pain with radiation to the left shoulder that 3/10 with 2 SLN and eventual resolution  Pain similar to what he experienced with his MI in the past   EKG reported to be unchanged, unable to personally review  Repeat HS troponin 75--> 152--> 111  2  CAD:  Maintained on Plavix, beta-blocker, statin  typically not on aspirin due to anticoagulation with Eliquis  · History of remote inferior MI in 1989  · TIMOTHY to [address] [place] [address] in 2009  · s/p CABG x3 in 2017 in [place]  · s/p TIMOTHY x2 to LM and LCX at [facility] on 05/31/2022  3  PAF:  Asymptomatic, diagnosed after his bypass surgery 5 years ago  Currently maintaining NSR on Lopressor 100 mg BID  Typically anticoagulated on Eliquis which has been switched to heparin infusion this admission  4  Sinus bradycardia:  Heart rates mostly in the 50s on telemetry which patient states is a very unusual for him  He denies any current lightheadedness or dizziness  No evidence of high-degree AV block or pauses  Lopressor was decreased to 50 mg BID with improvement in heart rates to the 60-70s  5  Preserved biventricular systolic function:   EF 55% with no WMA, G1DD, normal RV function, no significant valvular abnormality on echocardiogram this admission  6   Essential hypertension:  Average /69 on Isordil 20 mg TID, chlorthalidone 25 mg daily, lisinopril 40 mg daily, Lopressor 50 mg BID  7  Hyperlipidemia: TC 75, , HDL 25, LDL 27 on atorvastatin 80 mg daily and Zetia 5 mg daily  8  Type 2 DM:  Hemoglobin A1c 6 8  Management per primary team           Plan/Recommendations:  · Should patient have recurrent angina will add nitro paste  · NPO for cardiac catheterization today  · The procedure was explained to the patient in detail along with the risks versus benefits after all questions and concerns were addressed patient is agreeable to proceed  · Continue aspirin, Plavix, statin, beta-blocker and heparin infusion  · Records have been requested from his primary cardiologist Dr Dank Thompson at the [place]  __________________________________________________________    Subjective    Patient seen and examined  Overnight he had an episode of severe sharp chest pain rated 10/10 with radiation to his left arm that was reminiscent of the pain he had with his MI many years ago  He denies any associated shortness of breath, diaphoresis, nausea or vomiting  He received sublingual nitro with improvement in the pain but it started to get worse again and another sublingual nitro was given with eventual resolution  He has not had any recurrent pain since  He denies any shortness of breath or palpitations  Review of Systems   Constitutional: Negative  Negative for chills  Cardiovascular: Negative for chest pain, dyspnea on exertion, leg swelling, near-syncope, orthopnea, palpitations, paroxysmal nocturnal dyspnea and syncope  Respiratory: Negative  Negative for cough, shortness of breath and wheezing  Endocrine: Negative  Hematologic/Lymphatic: Negative  Skin: Negative  Musculoskeletal: Negative  Gastrointestinal: Negative  Negative for diarrhea, nausea and vomiting  Neurological: Negative for dizziness, light-headedness and weakness  Psychiatric/Behavioral: Negative    Negative for altered mental status  All other systems reviewed and are negative  Objective:   Vitals: Blood pressure 116/59, pulse 61, temperature 97 9 °F (36 6 °C), resp  rate 18, height 5' 8" (1 727 m), weight 129 kg (284 lb), SpO2 96 %  ,     Wt Readings from Last 3 Encounters:   09/29/22 129 kg (284 lb)   11/05/20 129 kg (284 lb)   09/21/20 129 kg (284 lb 3 2 oz)        Lab Results   Component Value Date    CREATININE 1 23 09/30/2022    CREATININE 1 06 09/29/2022    CREATININE 1 13 09/28/2022         Body mass index is 43 18 kg/m²  ,     Systolic (79FAS), GUV:794 , Min:104 , EIT:601     Diastolic (91WUS), YJX:53, Min:47, Max:97        No intake or output data in the 24 hours ending 09/30/22 1013  Weight (last 2 days)     Date/Time Weight    09/29/22 1355 129 (284)    09/28/22 1742 129 (284 39)        Telemetry Review: No significant arrhythmias seen on telemetry review  Heart rate 60-70s      Physical Exam  Vitals and nursing note reviewed  Constitutional:       General: He is not in acute distress  Appearance: He is well-developed  He is obese  Comments: On RA in NAD   HENT:      Head: Normocephalic and atraumatic  Neck:      Vascular: No JVD  Cardiovascular:      Rate and Rhythm: Normal rate and regular rhythm  Heart sounds: Normal heart sounds  No murmur heard  No friction rub  No gallop  Pulmonary:      Effort: Pulmonary effort is normal  No respiratory distress  Breath sounds: Normal breath sounds  No wheezing or rales  Chest:      Chest wall: No tenderness  Abdominal:      General: Bowel sounds are normal  There is no distension  Palpations: Abdomen is soft  Tenderness: There is no abdominal tenderness  Musculoskeletal:         General: No tenderness  Normal range of motion  Cervical back: Normal range of motion and neck supple  Right lower leg: No edema  Left lower leg: No edema  Skin:     General: Skin is warm and dry        Coloration: Skin is not pale       Findings: No erythema  Neurological:      Mental Status: He is alert and oriented to person, place, and time  Psychiatric:         Behavior: Behavior normal          Thought Content: Thought content normal          Judgment: Judgment normal          LABORATORY RESULTS      CBC with diff:   Results from last 7 days   Lab Units 09/30/22  0520 09/29/22  0713 09/28/22  1328   WBC Thousand/uL 9 16 11 59* 14 86*   HEMOGLOBIN g/dL 12 7 13 2 13 8   HEMATOCRIT % 39 0 40 1 43 5   MCV fL 83 83 84   PLATELETS Thousands/uL 194 213 232   MCH pg 27 1 27 2 26 6*   MCHC g/dL 32 6 32 9 31 7   RDW % 17 6* 17 9* 18 5*   MPV fL 11 2 11 6 11 5   NRBC AUTO /100 WBCs 0 0 0       CMP:  Results from last 7 days   Lab Units 09/30/22  0520 09/29/22  0713 09/28/22  1328   POTASSIUM mmol/L 3 5 3 5 3 7   CHLORIDE mmol/L 103 102 104   CO2 mmol/L 26 27 27   BUN mg/dL 26* 23 23   CREATININE mg/dL 1 23 1 06 1 13   CALCIUM mg/dL 8 8 9 1 9 6   AST U/L  --   --  16   ALT U/L  --   --  17   ALK PHOS U/L  --   --  98   EGFR ml/min/1 73sq m 58 69 64       BMP:  Results from last 7 days   Lab Units 09/30/22  0520 09/29/22  0713 09/28/22  1328   POTASSIUM mmol/L 3 5 3 5 3 7   CHLORIDE mmol/L 103 102 104   CO2 mmol/L 26 27 27   BUN mg/dL 26* 23 23   CREATININE mg/dL 1 23 1 06 1 13   CALCIUM mg/dL 8 8 9 1 9 6       No results found for: NTBNP                 Results from last 7 days   Lab Units 09/29/22  0038   HEMOGLOBIN A1C % 6 8*              Results from last 7 days   Lab Units 09/28/22  1328   INR  1 39*       Lipid Profile:   No results found for: CHOL  Lab Results   Component Value Date    HDL 25 (L) 09/29/2022     Lab Results   Component Value Date    LDLCALC 27 09/29/2022     Lab Results   Component Value Date    TRIG 114 09/29/2022       Cardiac testing:   No results found for this or any previous visit  No results found for this or any previous visit  No results found for this or any previous visit      No valid procedures specified  No results found for this or any previous visit  Meds/Allergies   all current active meds have been reviewed, current meds:   Current Facility-Administered Medications   Medication Dose Route Frequency    acetaminophen (TYLENOL) tablet 650 mg  650 mg Oral Q6H PRN    aspirin (ECOTRIN LOW STRENGTH) EC tablet 81 mg  81 mg Oral Daily    aspirin chewable tablet 324 mg  324 mg Oral Once    atorvastatin (LIPITOR) tablet 80 mg  80 mg Oral HS    calcium carbonate-vitamin D (OSCAL-D) 500 mg-200 units per tablet 2 tablet  2 tablet Oral BID    chlorthalidone tablet 12 5 mg  12 5 mg Oral Early Morning    cholecalciferol (VITAMIN D3) tablet 2,000 Units  2,000 Units Oral Daily    clonazePAM (KlonoPIN) tablet 0 5 mg  0 5 mg Oral BID    clopidogrel (PLAVIX) tablet 75 mg  75 mg Oral Daily    cyanocobalamin (VITAMIN B-12) tablet 100 mcg  100 mcg Oral Q30 Days    ezetimibe (ZETIA) tablet 5 mg  5 mg Oral Daily    gabapentin (NEURONTIN) capsule 400 mg  400 mg Oral TID    heparin (porcine) 25,000 units in 0 45% NaCl 250 mL infusion (premix)  3-20 Units/kg/hr (Order-Specific) Intravenous Titrated    insulin detemir (LEVEMIR) subcutaneous injection 20 Units  20 Units Subcutaneous HS    insulin regular (HumuLIN R,NovoLIN R) injection 7 Units  7 Units Subcutaneous TID With Meals    isosorbide dinitrate (ISORDIL) tablet 20 mg  20 mg Oral TID    levothyroxine tablet 88 mcg  88 mcg Oral Early Morning    lisinopril (ZESTRIL) tablet 40 mg  40 mg Oral After Breakfast    metoprolol tartrate (LOPRESSOR) tablet 50 mg  50 mg Oral Q12H LIZBET    morphine injection 2 mg  2 mg Intravenous Q4H PRN    nitroglycerin (NITROSTAT) SL tablet 0 4 mg  0 4 mg Sublingual Q5 Min PRN    pregabalin (LYRICA) capsule 100 mg  100 mg Oral TID    sodium chloride 0 9 % infusion  100 mL/hr Intravenous Continuous    and PTA meds:   Prior to Admission Medications   Prescriptions Last Dose Informant Patient Reported? Taking? EMPAGLIFLOZIN-LINAGLIPTIN PO 9/28/2022 at Unknown time  Yes Yes   Sig: Take 5 mg by mouth   Levothyroxine Sodium 88 MCG CAPS 9/28/2022 at Unknown time Self Yes Yes   Sig: Take by mouth   apixaban (ELIQUIS) 5 mg 9/28/2022 at Unknown time Self Yes Yes   Sig: apixaban 5 mg tablet   aspirin 325 mg tablet Not Taking at Unknown time Self Yes No   Sig: Take 325 mg by mouth daily at bedtime     Patient not taking: Reported on 9/28/2022   atorvastatin (LIPITOR) 80 mg tablet 9/28/2022 at Unknown time Self Yes Yes   Sig: Take 80 mg by mouth daily at bedtime     calcium-vitamin D (OSCAL) 250-125 MG-UNIT per tablet 9/28/2022 at Unknown time Self Yes Yes   Sig: Take 2 tablets by mouth 2 (two) times a day   chlorthalidone 25 mg tablet 9/27/2022 at Unknown time Self Yes Yes   Sig: Take 25 mg by mouth daily in the early morning     cholecalciferol (VITAMIN D3) 1,000 units tablet 9/28/2022 at Unknown time Self Yes Yes   Sig: Take 2,000 Units by mouth daily   clonazePAM (KlonoPIN) 0 5 mg tablet 9/28/2022 at Unknown time Self Yes Yes   Sig: Take 0 5 mg by mouth 2 (two) times a day   cyanocobalamin 1000 MCG tablet 9/28/2022 at Unknown time Self Yes Yes   Sig: Take 100 mcg by mouth every 30 (thirty) days   ezetimibe (ZETIA) 10 mg tablet 9/27/2022 at Unknown time Self Yes Yes   Sig: Take 5 mg by mouth daily   gabapentin (NEURONTIN) 400 mg capsule Not Taking at Unknown time Self Yes No   Sig: Take 400 mg by mouth 3 (three) times a day     Patient not taking: Reported on 9/28/2022   insulin aspart (NovoLOG) 100 units/mL injection 9/28/2022 at Unknown time Self Yes Yes   Sig: Inject 10 Units under the skin 3 (three) times a day with meals   insulin detemir (LEVEMIR) 100 units/mL subcutaneous injection Not Taking at Unknown time Self Yes No   Sig: Inject 70 Units under the skin daily at bedtime     Patient not taking: Reported on 9/29/2022   insulin glargine (Lantus) 100 units/mL subcutaneous injection 9/28/2022 at Unknown time Self Yes Yes Sig: Inject 38 Units under the skin daily at bedtime   insulin regular (HumuLIN R,NovoLIN R) 100 units/mL injection Not Taking at Unknown time Self Yes No   Sig: Inject 26 Units under the skin 3 (three) times a day with meals     Patient not taking: Reported on 9/28/2022   isosorbide dinitrate (ISORDIL) 20 mg tablet 9/28/2022 at Unknown time Self Yes Yes   Sig: Take 20 mg by mouth 3 (three) times a day   lisinopril (ZESTRIL) 40 mg tablet 9/28/2022 at Unknown time Self Yes Yes   Sig: Take 40 mg by mouth daily after breakfast     metFORMIN (GLUCOPHAGE) 1000 MG tablet 9/28/2022 at Unknown time Self Yes Yes   Sig: Take 1,000 mg by mouth 2 (two) times a day with meals   metoprolol tartrate (LOPRESSOR) 100 mg tablet 9/28/2022 at Unknown time Self Yes Yes   Sig: Take 100 mg by mouth every 12 (twelve) hours   pregabalin (LYRICA) 100 mg capsule 9/28/2022 at Unknown time Self Yes Yes   Sig: Take 100 mg by mouth 3 (three) times a day      Facility-Administered Medications: None     Medications Prior to Admission   Medication    apixaban (ELIQUIS) 5 mg    atorvastatin (LIPITOR) 80 mg tablet    calcium-vitamin D (OSCAL) 250-125 MG-UNIT per tablet    chlorthalidone 25 mg tablet    cholecalciferol (VITAMIN D3) 1,000 units tablet    clonazePAM (KlonoPIN) 0 5 mg tablet    cyanocobalamin 1000 MCG tablet    EMPAGLIFLOZIN-LINAGLIPTIN PO    ezetimibe (ZETIA) 10 mg tablet    insulin aspart (NovoLOG) 100 units/mL injection    insulin glargine (Lantus) 100 units/mL subcutaneous injection    isosorbide dinitrate (ISORDIL) 20 mg tablet    Levothyroxine Sodium 88 MCG CAPS    lisinopril (ZESTRIL) 40 mg tablet    metFORMIN (GLUCOPHAGE) 1000 MG tablet    metoprolol tartrate (LOPRESSOR) 100 mg tablet    pregabalin (LYRICA) 100 mg capsule    aspirin 325 mg tablet    gabapentin (NEURONTIN) 400 mg capsule    insulin detemir (LEVEMIR) 100 units/mL subcutaneous injection    insulin regular (HumuLIN R,NovoLIN R) 100 units/mL injection       heparin (porcine), 3-20 Units/kg/hr (Order-Specific), Last Rate: 11 1 Units/kg/hr (09/29/22 0899)  sodium chloride, 100 mL/hr, Last Rate: 100 mL/hr (09/30/22 9327)        Counseling / Coordination of Care  Total floor / unit time spent today 20 minutes  Greater than 50% of total time was spent with the patient and / or family counseling and / or coordination of care  ** Please Note: Dragon 360 Dictation voice to text software may have been used in the creation of this document   **

## 2022-10-01 PROBLEM — D72.829 LEUKOCYTOSIS: Status: RESOLVED | Noted: 2022-09-28 | Resolved: 2022-10-01

## 2022-10-01 LAB
ANION GAP SERPL CALCULATED.3IONS-SCNC: 9 MMOL/L (ref 4–13)
APTT PPP: 64 SECONDS (ref 23–37)
BUN SERPL-MCNC: 21 MG/DL (ref 5–25)
CALCIUM SERPL-MCNC: 8.9 MG/DL (ref 8.4–10.2)
CHLORIDE SERPL-SCNC: 103 MMOL/L (ref 96–108)
CO2 SERPL-SCNC: 23 MMOL/L (ref 21–32)
CREAT SERPL-MCNC: 1.15 MG/DL (ref 0.6–1.3)
ERYTHROCYTE [DISTWIDTH] IN BLOOD BY AUTOMATED COUNT: 17.4 % (ref 11.6–15.1)
GFR SERPL CREATININE-BSD FRML MDRD: 63 ML/MIN/1.73SQ M
GLUCOSE SERPL-MCNC: 112 MG/DL (ref 65–140)
GLUCOSE SERPL-MCNC: 122 MG/DL (ref 65–140)
GLUCOSE SERPL-MCNC: 189 MG/DL (ref 65–140)
GLUCOSE SERPL-MCNC: 200 MG/DL (ref 65–140)
HCT VFR BLD AUTO: 39.2 % (ref 36.5–49.3)
HGB BLD-MCNC: 12.7 G/DL (ref 12–17)
MCH RBC QN AUTO: 26.8 PG (ref 26.8–34.3)
MCHC RBC AUTO-ENTMCNC: 32.4 G/DL (ref 31.4–37.4)
MCV RBC AUTO: 83 FL (ref 82–98)
PLATELET # BLD AUTO: 183 THOUSANDS/UL (ref 149–390)
PMV BLD AUTO: 10.7 FL (ref 8.9–12.7)
POTASSIUM SERPL-SCNC: 3.8 MMOL/L (ref 3.5–5.3)
RBC # BLD AUTO: 4.74 MILLION/UL (ref 3.88–5.62)
SODIUM SERPL-SCNC: 135 MMOL/L (ref 135–147)
WBC # BLD AUTO: 7.41 THOUSAND/UL (ref 4.31–10.16)

## 2022-10-01 PROCEDURE — 85027 COMPLETE CBC AUTOMATED: CPT | Performed by: INTERNAL MEDICINE

## 2022-10-01 PROCEDURE — 80048 BASIC METABOLIC PNL TOTAL CA: CPT

## 2022-10-01 PROCEDURE — 82948 REAGENT STRIP/BLOOD GLUCOSE: CPT

## 2022-10-01 PROCEDURE — 85730 THROMBOPLASTIN TIME PARTIAL: CPT | Performed by: INTERNAL MEDICINE

## 2022-10-01 PROCEDURE — 99232 SBSQ HOSP IP/OBS MODERATE 35: CPT | Performed by: INTERNAL MEDICINE

## 2022-10-01 RX ADMIN — CLOPIDOGREL BISULFATE 75 MG: 75 TABLET ORAL at 09:12

## 2022-10-01 RX ADMIN — VITAM B12 100 MCG: 100 TAB at 09:12

## 2022-10-01 RX ADMIN — CLONAZEPAM 0.5 MG: 0.5 TABLET ORAL at 18:06

## 2022-10-01 RX ADMIN — ACETAMINOPHEN 650 MG: 325 TABLET ORAL at 22:03

## 2022-10-01 RX ADMIN — METOPROLOL TARTRATE 50 MG: 50 TABLET, FILM COATED ORAL at 22:05

## 2022-10-01 RX ADMIN — EZETIMIBE 5 MG: 10 TABLET ORAL at 09:12

## 2022-10-01 RX ADMIN — Medication 2 TABLET: at 18:07

## 2022-10-01 RX ADMIN — Medication 2 TABLET: at 09:12

## 2022-10-01 RX ADMIN — ISOSORBIDE DINITRATE 20 MG: 10 TABLET ORAL at 09:13

## 2022-10-01 RX ADMIN — PREGABALIN 100 MG: 100 CAPSULE ORAL at 09:13

## 2022-10-01 RX ADMIN — CLONAZEPAM 0.5 MG: 0.5 TABLET ORAL at 09:12

## 2022-10-01 RX ADMIN — LEVOTHYROXINE SODIUM 88 MCG: 88 TABLET ORAL at 05:32

## 2022-10-01 RX ADMIN — INSULIN HUMAN 7 UNITS: 100 INJECTION, SOLUTION PARENTERAL at 12:49

## 2022-10-01 RX ADMIN — CHLORTHALIDONE 12.5 MG: 25 TABLET ORAL at 05:33

## 2022-10-01 RX ADMIN — METOPROLOL TARTRATE 50 MG: 50 TABLET, FILM COATED ORAL at 09:11

## 2022-10-01 RX ADMIN — HEPARIN SODIUM 11.1 UNITS/KG/HR: 10000 INJECTION, SOLUTION INTRAVENOUS at 22:02

## 2022-10-01 RX ADMIN — Medication 2000 UNITS: at 09:12

## 2022-10-01 RX ADMIN — PREGABALIN 100 MG: 100 CAPSULE ORAL at 16:10

## 2022-10-01 RX ADMIN — INSULIN HUMAN 7 UNITS: 100 INJECTION, SOLUTION PARENTERAL at 09:19

## 2022-10-01 RX ADMIN — ASPIRIN 81 MG: 81 TABLET, COATED ORAL at 09:12

## 2022-10-01 RX ADMIN — ISOSORBIDE DINITRATE 20 MG: 10 TABLET ORAL at 16:10

## 2022-10-01 RX ADMIN — LISINOPRIL 40 MG: 20 TABLET ORAL at 09:13

## 2022-10-01 RX ADMIN — INSULIN DETEMIR 20 UNITS: 100 INJECTION, SOLUTION SUBCUTANEOUS at 22:06

## 2022-10-01 RX ADMIN — INSULIN HUMAN 7 UNITS: 100 INJECTION, SOLUTION PARENTERAL at 18:07

## 2022-10-01 RX ADMIN — ATORVASTATIN CALCIUM 80 MG: 40 TABLET, FILM COATED ORAL at 22:06

## 2022-10-01 RX ADMIN — PREGABALIN 100 MG: 100 CAPSULE ORAL at 22:04

## 2022-10-01 NOTE — ASSESSMENT & PLAN NOTE
Resolved  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 + 2 stent placements in may ( since then was on eliquis + asa 325mg )  Background of hypertension, hyperlipidemia , obesity, T2DM  CONSUELO score : 6  EKG: no acute ischemic changes, first degree AV block previously reported in Ekg 1/7/14  Troponins  181>226> 295 w/ deltas 45> 114  Heparin gtt and Asa 325 initiated in the Emergency department , he is on daily plavix  At the moment of my encounter AOx3 , Chest pain free , stable bp , bradycardic 50's range  Cardiology Recommendations: Echocardiogram for heart and valvular function, Orthostatic blood pressures, Lopressor dose decreased to 50 mg BID as patient is bradycardic in 50's  Echocardiogram:  Shows ejection fraction of 60% with normal systolic function and wall motion  Mildly abnormal diastolic function  Mild mitral valve regurgitation and pulmonic valve regurgitation  Nuclear stress test: "Perfusion: There is a left ventricular perfusion defect that is overall large in size and moderate in severity involving the basal to mid anterolateral and inferolateral walls  There is also mild reversibility involving the anterior wall and apex "    Patient has an episode of chest pain overnight which resolved after took sublingual nitroglycerin x2  As per cardiology patient's anatomy and prior interventions makes him high risk and [place] [facility] [place]  Plan:  Patient will be [facility] cardiac [facility]  Continue Telemetry monitoring    Continue heparin gtt   Continue aspirin and plavix   Will continue metoprolol at 50 mg, hold parameters HR <50

## 2022-10-01 NOTE — PLAN OF CARE
Problem: PAIN - ADULT  Goal: Verbalizes/displays adequate comfort level or baseline comfort level  Description: Interventions:  - Encourage patient to monitor pain and request assistance  - Assess pain using appropriate pain scale  - Administer analgesics based on type and severity of pain and evaluate response  - Implement non-pharmacological measures as appropriate and evaluate response  - Consider cultural and social influences on pain and pain management  - Notify physician/advanced practitioner if interventions unsuccessful or patient reports new pain  Outcome: Progressing     Problem: INFECTION - ADULT  Goal: Absence or prevention of progression during hospitalization  Description: INTERVENTIONS:  - Assess and monitor for signs and symptoms of infection  - Monitor lab/diagnostic results  - Monitor all insertion sites, i e  indwelling lines, tubes, and drains  - Monitor endotracheal if appropriate and nasal secretions for changes in amount and color  - [place] appropriate cooling/warming therapies per order  - Administer medications as ordered  - Instruct and encourage patient and family to use good hand hygiene technique  - Identify and instruct in appropriate isolation precautions for identified infection/condition  Outcome: Progressing  Goal: Absence of fever/infection during neutropenic period  Description: INTERVENTIONS:  - Monitor WBC    Outcome: Progressing     Problem: SAFETY ADULT  Goal: Patient will remain free of falls  Description: INTERVENTIONS:  - Educate patient/family on patient safety including physical limitations  - Instruct patient to call for assistance with activity   - Consult OT/PT to assist with strengthening/mobility   - Keep Call bell within reach  - Keep bed low and locked with side rails adjusted as appropriate  - Keep care items and personal belongings within reach  - Initiate and maintain comfort rounds  - Make Fall Risk Sign visible to staff  - Offer Toileting every 2 Hours, in advance of need  - Apply yellow socks and bracelet for high fall risk patients  - Consider moving patient to room near nurses station  Outcome: Progressing  Goal: Maintain or return to baseline ADL function  Description: INTERVENTIONS:  -  Assess patient's ability to carry out ADLs; assess patient's baseline for ADL function and identify physical deficits which impact ability to perform ADLs (bathing, care of mouth/teeth, toileting, grooming, dressing, etc )  - Assess/evaluate cause of self-care deficits   - Assess range of motion  - Assess patient's mobility; develop plan if impaired  - Assess patient's need for assistive devices and provide as appropriate  - Encourage maximum independence but intervene and supervise when necessary  - Involve family in performance of ADLs  - Assess for home care needs following discharge   - Consider OT consult to assist with ADL evaluation and planning for discharge  - Provide patient education as appropriate  Outcome: Progressing  Goal: Maintains/Returns to pre admission functional level  Description: INTERVENTIONS:  - Perform BMAT or MOVE assessment daily    - Set and communicate daily mobility goal to care team and patient/family/caregiver  - Collaborate with rehabilitation services on mobility goals if consulted  - Perform Range of Motion 3 times a day  - Reposition patient every 2 hours    - Dangle patient 3 times a day  - Stand patient 3 times a day  - Ambulate patient 3 times a day  - Out of bed to chair 3 times a day   - Out of bed for meals 3 times a day  - Out of bed for toileting  - Record patient progress and toleration of activity level   Outcome: Progressing     Problem: DISCHARGE PLANNING  Goal: Discharge to home or other facility with appropriate resources  Description: INTERVENTIONS:  - Identify barriers to discharge w/patient and caregiver  - Arrange for needed discharge resources and transportation as appropriate  - Identify discharge learning needs (meds, wound care, etc )  - Arrange for interpretive services to assist at discharge as needed  - Refer to Case Management Department for coordinating discharge planning if the patient needs post-hospital services based on physician/advanced practitioner order or complex needs related to functional status, cognitive ability, or social support system  Outcome: Progressing     Problem: Knowledge Deficit  Goal: Patient/family/caregiver demonstrates understanding of disease process, treatment plan, medications, and discharge instructions  Description: Complete learning assessment and assess knowledge base    Interventions:  - Provide teaching at level of understanding  - Provide teaching via preferred learning methods  Outcome: Progressing

## 2022-10-01 NOTE — ASSESSMENT & PLAN NOTE
Lab Results   Component Value Date    HGBA1C 6 8 (H) 09/29/2022       Recent Labs     09/30/22  1715 09/30/22  2216 10/01/22  0854 10/01/22  1141   POCGLU 180* 155* 200* 189*       Home regimen: lantus 30 u bedtime regular 10 tid  But since patient had low blood sugar the other day and had back-to-back NPO for a possibility of cardiac catheterization here, we decreased patient's Lantus to 20 units at bedtime and regular insulin to 7 units 3 times a day  So far, patient's blood sugars at acceptable levels  If blood sugars are significantly persistently high, we will increase back to his home regimen  Carbohydrate control diet   hypoglicemia protocol  bs goal 140-180  Adjust treatment accordingly

## 2022-10-01 NOTE — ASSESSMENT & PLAN NOTE
Wbc on admission 14 86  Possibly stress reactive  Leukocytosis resolved  Monitor p r n  Alexandrocelena Hearn

## 2022-10-01 NOTE — PROGRESS NOTES
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 + 2 stent placements in may ( since then was on eliquis + asa 325mg )  Background of hypertension, hyperlipidemia , obesity, T2DM  CONSUELO score : 6  EKG: no acute ischemic changes, first degree AV block previously reported in Ekg 1/7/14  Troponins  181>226> 295 w/ deltas 45> 114  Heparin gtt and Asa 325 initiated in the Emergency department , he is on daily plavix  At the moment of my encounter AOx3 , Chest pain free , stable bp , bradycardic 50's range  Cardiology Recommendations: Echocardiogram for heart and valvular function, Orthostatic blood pressures, Lopressor dose decreased to 50 mg BID as patient is bradycardic in 50's  Echocardiogram:  Shows ejection fraction of 60% with normal systolic function and wall motion  Mildly abnormal diastolic function  Mild mitral valve regurgitation and pulmonic valve regurgitation  Nuclear stress test: "Perfusion: There is a left ventricular perfusion defect that is overall large in size and moderate in severity involving the basal to mid anterolateral and inferolateral walls  There is also mild reversibility involving the anterior wall and apex "    Patient has an episode of chest pain overnight which resolved after took sublingual nitroglycerin x2  As per cardiology patient's anatomy and prior interventions makes him high risk and will be transferred to [place] for cardiac catheterization  Plan:  Patient will be transferred to [facility] for cardiac catheterization  Continue Telemetry monitoring    Continue heparin gtt   Continue aspirin and plavix   Will continue metoprolol at 50 mg, hold parameters HR <50    Type 2 diabetes mellitus [facility])  Assessment & Plan  Lab Results   Component Value Date    HGBA1C 6 8 (H) 09/29/2022       Recent Labs     09/30/22  1715 09/30/22  2216 10/01/22  0854 10/01/22  1141   POCGLU 180* 155* 200* 189*       Home regimen: lantus 30 u bedtime regular 10 tid  But since patient had low blood sugar the other day and had back-to-back NPO for a possibility of cardiac catheterization here, we decreased patient's Lantus to 20 units at bedtime and regular insulin to 7 units 3 times a day  So far, patient's blood sugars at acceptable levels  If blood sugars are significantly persistently high, we will increase back to his home regimen  Carbohydrate control diet   hypoglicemia protocol  bs goal 140-180  Adjust treatment accordingly  Hypothyroidism  Assessment & Plan  Continue levothyroxine 88 mcg daily  Obesity  Assessment & Plan  Educate and encourage life style modifications  Hyperlipidemia  Assessment & Plan  Lipid profile:  Cholesterol: 75, Triglycerides 114, HDL 25, LDL 27  Continue atorvastatin  Hypertension  Assessment & Plan  BP stable  Continue losartan  Continue metoprolol  Leukocytosis-resolved as of 10/1/2022  Assessment & Plan  Wbc on admission 14 86  Possibly stress reactive  Leukocytosis resolved  Monitor p r n  VTE Pharmacologic Prophylaxis: VTE Score: 5 High Risk (Score >/= 5) - Pharmacological DVT Prophylaxis Ordered: heparin drip  Sequential Compression Devices Ordered  Patient Centered Rounds: I performed bedside rounds with nursing staff today  Discussions with Specialists or Other Care Team Provider:  PACs  Education and Discussions with Family / Patient: Patient declined call to   Time Spent for Care: 30 minutes  More than 50% of total time spent on counseling and coordination of care as described above      Current Length of Stay: 3 day(s)  Current Patient Status: Inpatient   Certification Statement: The patient will continue to require additional inpatient hospital stay due to Above findings and plans and waiting for a bed at [facility]  Discharge Plan: Anticipate discharge tomorrow to [facility] I spoke to staff at patient access center and I was told, that patient likely will not have a bed today at [facility]  Code Status: Level 1 - Full Code    Subjective:   Patient is doing fine and feels a lot better  Patient denies any more chest pains or any other pains  No shortness of breath  No dizziness  No complaints  Objective:     Vitals:   Temp (24hrs), Av 5 °F (37 5 °C), Min:98 1 °F (36 7 °C), Max:99 9 °F (37 7 °C)    Temp:  [98 1 °F (36 7 °C)-99 9 °F (37 7 °C)] 99 9 °F (37 7 °C)  HR:  [65-72] 65  Resp:  [16-18] 18  BP: (126-149)/(64-70) 149/68  SpO2:  [94 %-97 %] 96 %  Body mass index is 43 18 kg/m²  Input and Output Summary (last 24 hours):   No intake or output data in the 24 hours ending 10/01/22 9103    Physical Exam:   Physical Exam  Vitals and nursing note reviewed  Constitutional:       General: He is not in acute distress  Appearance: He is obese  He is not ill-appearing, toxic-appearing or diaphoretic  Cardiovascular:      Rate and Rhythm: Normal rate and regular rhythm  Heart sounds: Normal heart sounds  Pulmonary:      Effort: Pulmonary effort is normal  No respiratory distress  Breath sounds: Normal breath sounds  Abdominal:      General: Bowel sounds are normal  There is no distension  Palpations: Abdomen is soft  Tenderness: There is no abdominal tenderness  Musculoskeletal:      Right lower leg: Edema present  Left lower leg: Edema present  Comments: Plus one bilateral lower extremity edema (according to the patient, chronic and had been worse before)   Skin:     General: Skin is warm  Coloration: Skin is not pale  Findings: No erythema or rash  Neurological:      General: No focal deficit present  Mental Status: He is alert  Psychiatric:         Mood and Affect: Mood normal          Behavior: Behavior normal          Thought Content: Thought content normal               Additional Data:     Labs:  Results from last 7 days   Lab Units 10/01/22  0617 09/30/22  0520   WBC Thousand/uL 7 41 9 16   HEMOGLOBIN g/dL 12 7 12 7   HEMATOCRIT % 39 2 39 0   PLATELETS Thousands/uL 183 194   NEUTROS PCT %  --  61   LYMPHS PCT %  --  26   MONOS PCT %  --  9   EOS PCT %  --  3     Results from last 7 days   Lab Units 10/01/22  0617 09/29/22  0713 09/28/22  1328   SODIUM mmol/L 135   < > 137   POTASSIUM mmol/L 3 8   < > 3 7   CHLORIDE mmol/L 103   < > 104   CO2 mmol/L 23   < > 27   BUN mg/dL 21   < > 23   CREATININE mg/dL 1 15   < > 1 13   ANION GAP mmol/L 9   < > 6   CALCIUM mg/dL 8 9   < > 9 6   ALBUMIN g/dL  --   --  3 6   TOTAL BILIRUBIN mg/dL  --   --  0 93   ALK PHOS U/L  --   --  98   ALT U/L  --   --  17   AST U/L  --   --  16   GLUCOSE RANDOM mg/dL 112   < > 79    < > = values in this interval not displayed       Results from last 7 days   Lab Units 09/28/22  1328   INR  1 39*     Results from last 7 days   Lab Units 10/01/22  1141 10/01/22  0854 09/30/22  2216 09/30/22  1715 09/29/22  2339 09/29/22  1837 09/29/22  1742 09/29/22  0506 09/29/22  0408 09/28/22  2115 09/28/22  1947 09/28/22  1916   POC GLUCOSE mg/dl 189* 200* 155* 180* 156* 160* 65 97 59* 127 90 67     Results from last 7 days   Lab Units 09/29/22  0038   HEMOGLOBIN A1C % 6 8*           Lines/Drains:  Invasive Devices  Report    Peripheral Intravenous Line  Duration           Peripheral IV 09/28/22 3 days                  Telemetry:  Telemetry Orders (From admission, onward)             48 Hour Telemetry Monitoring  Continuous x 48 hours        References:    Telemetry Guidelines   Question:  Reason for 48 Hour Telemetry Answer:  Acute MI, chest pain - R/O MI, or unstable angina                 Telemetry Reviewed: Normal Sinus Rhythm  Indication for Continued Telemetry Use: Acute MI/Unstable Angina/Rule out ACS             Imaging: No pertinent imaging reviewed      Recent Cultures (last 7 days):         Last 24 Hours Medication List:   Current Facility-Administered Medications   Medication Dose Route Frequency Provider Last Rate    acetaminophen  650 mg Oral Q6H PRN Gary Crespo MD      aspirin  81 mg Oral Daily Gary Crespo MD      aspirin  324 mg Oral Once [employer], PA-C      atorvastatin  80 mg Oral HS Citlaly Jose MD      calcium carbonate-vitamin D  2 tablet Oral BID Citlaly Jose MD      chlorthalidone  12 5 mg Oral Early Morning Angella Solomon MD      cholecalciferol  2,000 Units Oral Daily Citlaly Jose MD      clonazePAM  0 5 mg Oral BID Citlaly Jose MD      clopidogrel  75 mg Oral Daily Citlaly Jose MD      cyanocobalamin  100 mcg Oral Daily Alex Goncalves MD      ezetimibe  5 mg Oral Daily Citlaly Jose MD      gabapentin  400 mg Oral TID Citlaly Jose MD      heparin (porcine)  3-20 Units/kg/hr (Order-Specific) Intravenous Titrated Emile Arrow, DO 11 1 Units/kg/hr (09/30/22 2033)    insulin detemir  20 Units Subcutaneous HS Les Panda MD      insulin regular  7 Units Subcutaneous TID With Meals Gary Crespo MD      isosorbide dinitrate  20 mg Oral TID Citlaly Jose MD      levothyroxine  88 mcg Oral Early Morning Citlaly Jose MD      lisinopril  40 mg Oral After Breakfast Citlaly Jose MD      metoprolol tartrate  50 mg Oral Q12H [address] [facility] S, PA-C      morphine injection  2 mg Intravenous Q4H PRN Gary Crespo MD      nitroglycerin  0 4 mg Sublingual Q5 Min PRN Les Panda MD      pregabalin  100 mg Oral TID Citlaly Jose MD          Today, Patient Was Seen By: Sal Montalvo Jevon Panda    **Please Note: This note may have been constructed using a voice recognition system  **

## 2022-10-02 ENCOUNTER — HOSPITAL ENCOUNTER (INPATIENT)
Facility: HOSPITAL | Age: 72
LOS: 2 days | Discharge: HOME | DRG: 287 | End: 2022-10-04
Attending: INTERNAL MEDICINE | Admitting: INTERNAL MEDICINE
Payer: COMMERCIAL

## 2022-10-02 VITALS
TEMPERATURE: 98.3 F | RESPIRATION RATE: 18 BRPM | SYSTOLIC BLOOD PRESSURE: 133 MMHG | HEIGHT: 68 IN | WEIGHT: 284 LBS | OXYGEN SATURATION: 96 % | DIASTOLIC BLOOD PRESSURE: 64 MMHG | BODY MASS INDEX: 43.04 KG/M2 | HEART RATE: 63 BPM

## 2022-10-02 DIAGNOSIS — I20.0 UNSTABLE ANGINA (HCC): ICD-10-CM

## 2022-10-02 DIAGNOSIS — I20.0 UNSTABLE ANGINA PECTORIS (HCC): ICD-10-CM

## 2022-10-02 DIAGNOSIS — I10 PRIMARY HYPERTENSION: ICD-10-CM

## 2022-10-02 DIAGNOSIS — Z86.79 HISTORY OF CORONARY ARTERY DISEASE: Primary | ICD-10-CM

## 2022-10-02 PROBLEM — R50.9 FEVER: Status: ACTIVE | Noted: 2022-10-02

## 2022-10-02 PROBLEM — R00.1 SINUS BRADYCARDIA: Status: ACTIVE | Noted: 2022-10-02

## 2022-10-02 LAB
APTT PPP: 65 SECONDS (ref 23–37)
BASOPHILS # BLD AUTO: 0.04 THOUSANDS/ΜL (ref 0–0.1)
BASOPHILS NFR BLD AUTO: 1 % (ref 0–1)
EOSINOPHIL # BLD AUTO: 0.08 THOUSAND/ΜL (ref 0–0.61)
EOSINOPHIL NFR BLD AUTO: 1 % (ref 0–6)
ERYTHROCYTE [DISTWIDTH] IN BLOOD BY AUTOMATED COUNT: 17.8 % (ref 11.6–15.1)
GLUCOSE SERPL-MCNC: 119 MG/DL (ref 65–140)
GLUCOSE SERPL-MCNC: 130 MG/DL (ref 65–140)
GLUCOSE SERPL-MCNC: 208 MG/DL (ref 65–140)
HCT VFR BLD AUTO: 41 % (ref 36.5–49.3)
HGB BLD-MCNC: 13.4 G/DL (ref 12–17)
IMM GRANULOCYTES # BLD AUTO: 0.05 THOUSAND/UL (ref 0–0.2)
IMM GRANULOCYTES NFR BLD AUTO: 1 % (ref 0–2)
LYMPHOCYTES # BLD AUTO: 1.64 THOUSANDS/ΜL (ref 0.6–4.47)
LYMPHOCYTES NFR BLD AUTO: 23 % (ref 14–44)
MCH RBC QN AUTO: 27.1 PG (ref 26.8–34.3)
MCHC RBC AUTO-ENTMCNC: 32.7 G/DL (ref 31.4–37.4)
MCV RBC AUTO: 83 FL (ref 82–98)
MONOCYTES # BLD AUTO: 0.91 THOUSAND/ΜL (ref 0.17–1.22)
MONOCYTES NFR BLD AUTO: 13 % (ref 4–12)
NEUTROPHILS # BLD AUTO: 4.58 THOUSANDS/ΜL (ref 1.85–7.62)
NEUTS SEG NFR BLD AUTO: 61 % (ref 43–75)
NRBC BLD AUTO-RTO: 0 /100 WBCS
PLATELET # BLD AUTO: 170 THOUSANDS/UL (ref 149–390)
PMV BLD AUTO: 11 FL (ref 8.9–12.7)
RBC # BLD AUTO: 4.94 MILLION/UL (ref 3.88–5.62)
WBC # BLD AUTO: 7.3 THOUSAND/UL (ref 4.31–10.16)

## 2022-10-02 PROCEDURE — 82948 REAGENT STRIP/BLOOD GLUCOSE: CPT

## 2022-10-02 PROCEDURE — 99223 1ST HOSP IP/OBS HIGH 75: CPT | Performed by: INTERNAL MEDICINE

## 2022-10-02 PROCEDURE — 85025 COMPLETE CBC W/AUTO DIFF WBC: CPT

## 2022-10-02 PROCEDURE — 85730 THROMBOPLASTIN TIME PARTIAL: CPT | Performed by: INTERNAL MEDICINE

## 2022-10-02 PROCEDURE — 99239 HOSP IP/OBS DSCHRG MGMT >30: CPT | Performed by: INTERNAL MEDICINE

## 2022-10-02 RX ORDER — ISOSORBIDE DINITRATE 20 MG/1
20 TABLET ORAL 3 TIMES DAILY
Status: DISCONTINUED | OUTPATIENT
Start: 2022-10-02 | End: 2022-10-04 | Stop reason: HOSPADM

## 2022-10-02 RX ORDER — EZETIMIBE 10 MG/1
5 TABLET ORAL DAILY
Status: DISCONTINUED | OUTPATIENT
Start: 2022-10-03 | End: 2022-10-04 | Stop reason: HOSPADM

## 2022-10-02 RX ORDER — NITROGLYCERIN 0.4 MG/1
0.4 TABLET SUBLINGUAL
Status: DISCONTINUED | OUTPATIENT
Start: 2022-10-02 | End: 2022-10-04 | Stop reason: HOSPADM

## 2022-10-02 RX ORDER — MELATONIN
2000 DAILY
Status: DISCONTINUED | OUTPATIENT
Start: 2022-10-03 | End: 2022-10-04 | Stop reason: HOSPADM

## 2022-10-02 RX ORDER — LISINOPRIL 20 MG/1
40 TABLET ORAL
Status: DISCONTINUED | OUTPATIENT
Start: 2022-10-03 | End: 2022-10-02

## 2022-10-02 RX ORDER — CHLORTHALIDONE 25 MG/1
12.5 TABLET ORAL
Status: DISCONTINUED | OUTPATIENT
Start: 2022-10-03 | End: 2022-10-02

## 2022-10-02 RX ORDER — PREGABALIN 100 MG/1
100 CAPSULE ORAL 3 TIMES DAILY
Status: DISCONTINUED | OUTPATIENT
Start: 2022-10-02 | End: 2022-10-04 | Stop reason: HOSPADM

## 2022-10-02 RX ORDER — CLONAZEPAM 0.5 MG/1
0.5 TABLET ORAL 2 TIMES DAILY
Status: DISCONTINUED | OUTPATIENT
Start: 2022-10-03 | End: 2022-10-04 | Stop reason: HOSPADM

## 2022-10-02 RX ORDER — ATORVASTATIN CALCIUM 80 MG/1
80 TABLET, FILM COATED ORAL
Status: DISCONTINUED | OUTPATIENT
Start: 2022-10-02 | End: 2022-10-04 | Stop reason: HOSPADM

## 2022-10-02 RX ORDER — HEPARIN SODIUM 10000 [USP'U]/100ML
3-20 INJECTION, SOLUTION INTRAVENOUS
Status: DISCONTINUED | OUTPATIENT
Start: 2022-10-02 | End: 2022-10-03

## 2022-10-02 RX ORDER — GABAPENTIN 400 MG/1
400 CAPSULE ORAL 3 TIMES DAILY
Status: DISCONTINUED | OUTPATIENT
Start: 2022-10-02 | End: 2022-10-02

## 2022-10-02 RX ORDER — LEVOTHYROXINE SODIUM 88 UG/1
88 TABLET ORAL
Status: DISCONTINUED | OUTPATIENT
Start: 2022-10-03 | End: 2022-10-04 | Stop reason: HOSPADM

## 2022-10-02 RX ORDER — ACETAMINOPHEN 325 MG/1
650 TABLET ORAL EVERY 6 HOURS PRN
Status: DISCONTINUED | OUTPATIENT
Start: 2022-10-02 | End: 2022-10-04 | Stop reason: HOSPADM

## 2022-10-02 RX ORDER — CLOPIDOGREL BISULFATE 75 MG/1
75 TABLET ORAL DAILY
Status: DISCONTINUED | OUTPATIENT
Start: 2022-10-03 | End: 2022-10-04 | Stop reason: HOSPADM

## 2022-10-02 RX ORDER — ASPIRIN 81 MG/1
81 TABLET ORAL DAILY
Status: DISCONTINUED | OUTPATIENT
Start: 2022-10-03 | End: 2022-10-04 | Stop reason: HOSPADM

## 2022-10-02 RX ORDER — METOPROLOL TARTRATE 50 MG/1
50 TABLET, FILM COATED ORAL EVERY 12 HOURS SCHEDULED
Status: DISCONTINUED | OUTPATIENT
Start: 2022-10-02 | End: 2022-10-04 | Stop reason: HOSPADM

## 2022-10-02 RX ADMIN — EZETIMIBE 5 MG: 10 TABLET ORAL at 08:28

## 2022-10-02 RX ADMIN — INSULIN HUMAN 7 UNITS: 100 INJECTION, SOLUTION PARENTERAL at 08:31

## 2022-10-02 RX ADMIN — INSULIN DETEMIR 20 UNITS: 100 INJECTION, SOLUTION SUBCUTANEOUS at 21:35

## 2022-10-02 RX ADMIN — INSULIN HUMAN 7 UNITS: 100 INJECTION, SOLUTION PARENTERAL at 12:54

## 2022-10-02 RX ADMIN — METOPROLOL TARTRATE 50 MG: 50 TABLET, FILM COATED ORAL at 08:28

## 2022-10-02 RX ADMIN — LEVOTHYROXINE SODIUM 88 MCG: 88 TABLET ORAL at 05:01

## 2022-10-02 RX ADMIN — PREGABALIN 100 MG: 100 CAPSULE ORAL at 21:35

## 2022-10-02 RX ADMIN — ISOSORBIDE DINITRATE 20 MG: 10 TABLET ORAL at 08:28

## 2022-10-02 RX ADMIN — CLOPIDOGREL BISULFATE 75 MG: 75 TABLET ORAL at 08:28

## 2022-10-02 RX ADMIN — ISOSORBIDE DINITRATE 20 MG: 20 TABLET ORAL at 21:40

## 2022-10-02 RX ADMIN — ATORVASTATIN CALCIUM 80 MG: 80 TABLET, FILM COATED ORAL at 21:35

## 2022-10-02 RX ADMIN — INSULIN HUMAN 7 UNITS: 100 INJECTION, SOLUTION PARENTERAL at 17:44

## 2022-10-02 RX ADMIN — LISINOPRIL 40 MG: 20 TABLET ORAL at 08:28

## 2022-10-02 RX ADMIN — PREGABALIN 100 MG: 100 CAPSULE ORAL at 16:00

## 2022-10-02 RX ADMIN — CHLORTHALIDONE 12.5 MG: 25 TABLET ORAL at 05:04

## 2022-10-02 RX ADMIN — CLONAZEPAM 0.5 MG: 0.5 TABLET ORAL at 17:43

## 2022-10-02 RX ADMIN — VITAM B12 100 MCG: 100 TAB at 08:29

## 2022-10-02 RX ADMIN — CLONAZEPAM 0.5 MG: 0.5 TABLET ORAL at 08:28

## 2022-10-02 RX ADMIN — ISOSORBIDE DINITRATE 20 MG: 10 TABLET ORAL at 16:00

## 2022-10-02 RX ADMIN — METOPROLOL TARTRATE 50 MG: 50 TABLET, FILM COATED ORAL at 21:35

## 2022-10-02 RX ADMIN — PREGABALIN 100 MG: 100 CAPSULE ORAL at 08:28

## 2022-10-02 RX ADMIN — HEPARIN SODIUM 11.1 UNITS/KG/HR: 10000 INJECTION, SOLUTION INTRAVENOUS at 20:13

## 2022-10-02 RX ADMIN — Medication 2 TABLET: at 17:43

## 2022-10-02 RX ADMIN — Medication 2000 UNITS: at 08:28

## 2022-10-02 RX ADMIN — ASPIRIN 81 MG: 81 TABLET, COATED ORAL at 08:28

## 2022-10-02 RX ADMIN — Medication 2 TABLET: at 08:28

## 2022-10-02 NOTE — ASSESSMENT & PLAN NOTE
Lab Results   Component Value Date    HGBA1C 6 8 (H) 09/29/2022       Recent Labs     10/01/22  1141 10/01/22  2039 10/02/22  0800 10/02/22  1143   POCGLU 189* 122 119 208*       Home regimen: lantus 30 u bedtime regular 10 tid  But since patient had low blood sugar the other day and had back-to-back NPO for a possibility of cardiac catheterization here, we decreased patient's Lantus to 20 units at bedtime and regular insulin to 7 units 3 times a day  So far, patient's blood sugars at acceptable levels  If blood sugars are significantly persistently high, we will increase back to his home regimen  Carbohydrate control diet   hypoglicemia protocol  bs goal 140-180  Adjust treatment accordingly

## 2022-10-02 NOTE — DISCHARGE SUMMARY
[facility]  Discharge- Evgeny Erickson 1950, 67 y o  male MRN: [id number]  Unit/Bed#: S -01 Encounter: [id number]  Primary Care Provider: Vickie Wright DO   Date and time admitted to hospital: 9/28/2022  1:41 PM    * Chest pain  Assessment & Plan  Resolved  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 + 2 stent placements in may ( since then was on eliquis + asa 325mg )  Background of hypertension, hyperlipidemia , obesity, T2DM  CONSUELO score : 6  EKG: no acute ischemic changes, first degree AV block previously reported in Ekg 1/7/14  Troponins  181>226> 295 w/ deltas 45> 114  Heparin gtt and Asa 325 initiated in the Emergency department , he is on daily plavix  At the moment of my encounter AOx3 , Chest pain free , stable bp , bradycardic 50's range  Cardiology Recommendations: Echocardiogram for heart and valvular function, Orthostatic blood pressures, Lopressor dose decreased to 50 mg BID as patient is bradycardic in 50's  Echocardiogram:  Shows ejection fraction of 60% with normal systolic function and wall motion  Mildly abnormal diastolic function  Mild mitral valve regurgitation and pulmonic valve regurgitation  Nuclear stress test: "Perfusion: There is a left ventricular perfusion defect that is overall large in size and moderate in severity involving the basal to mid anterolateral and inferolateral walls  There is also mild reversibility involving the anterior wall and apex "    Patient has an repeat episode of chest pain in the night of 9/29 which resolved after took sublingual nitroglycerin x2  As per cardiology patient's anatomy and prior interventions makes him high risk and will be transferred to [place] for cardiac catheterization  Patient has an episode of fever with temp of 100 4 tylenol was given  Temperature has been ranging from 97 7-98 this morning  WBC is normal this a m  Plan:  Patient is being transferred to [facility] for cardiac catheterization  Continue Telemetry monitoring  Continue heparin gtt   Continue aspirin and plavix   Will continue metoprolol at 50 mg, hold parameters HR <50    Type 2 diabetes mellitus [facility])  Assessment & Plan  Lab Results   Component Value Date    HGBA1C 6 8 (H) 09/29/2022       Recent Labs     10/01/22  1141 10/01/22  2039 10/02/22  0800 10/02/22  1143   POCGLU 189* 122 119 208*       Home regimen: lantus 30 u bedtime regular 10 tid  But since patient had low blood sugar the other day and had back-to-back NPO for a possibility of cardiac catheterization here, we decreased patient's Lantus to 20 units at bedtime and regular insulin to 7 units 3 times a day  So far, patient's blood sugars at acceptable levels  If blood sugars are significantly persistently high, we will increase back to his home regimen  Carbohydrate control diet   hypoglicemia protocol  bs goal 140-180  Adjust treatment accordingly  Hypertension  Assessment & Plan  BP stable  Continue losartan  Continue metoprolol  Hyperlipidemia  Assessment & Plan  Lipid profile:  Cholesterol: 75, Triglycerides 114, HDL 25, LDL 27  Continue atorvastatin  Hypothyroidism  Assessment & Plan  Continue levothyroxine 88 mcg daily  Obesity  Assessment & Plan  Educate and encourage life style modifications          Discharging Resident Physician: Maria Ramires  Attending: Lisa Patel*  PCP: Isaiah Malave DO  Admission Date: 9/28/2022  Discharge Date: 10/02/22    Disposition:     Other: Transferred to [facility]    Reason for Admission:  Chest pressure    Consultations During Hospital Stay:  · Cardiology    Procedures Performed:     · Nuclear medicine stress test    Significant Findings / Test Results:     Nuclear medicine myocardial perfusion test showed: ' Perfusion: There is a left ventricular perfusion defect that is overall large in size and moderate in severity involving the basal to mid anterolateral and inferolateral walls  There is also mild reversibility involving the anterior wall and apex "  Echocardiogram: "Left ventricular cavity size is normal  Wall thickness is normal  The left ventricular ejection fraction is 60%  Systolic function is normal  Wall motion is normal  Diastolic function is mildly abnormal, consistent with grade I (abnormal) relaxation " Along with mild mitral valve regurgitation and mild pulmonic valve regurgitation    Incidental Findings:   · None    Test Results Pending at Discharge (will require follow up): · None     Outpatient Tests Requested:  · None    Complications:  None    Hospital Course:     Chilo Santizo is a 67 y o  male patient with past medical history of coronary artery disease with inferior wall MI in 1989 with stent placement patient had triple-vessel CABG in 2017 along with drug-eluting stents placement in left main and left circumflex, hypertension, hyperlipidemia, type 2 diabetes on May of 2022 who originally presented to the hospital on 9/28/2022 due to chest pressure which improved with sublingual nitroglycerin but did not completely resolve  EKG done in the ED the showed 6 sinus rhythm and no acute ST changes  Troponins were elevated and trended up  Patient was started on tele which showed normal sinus rhythm  Patient was on Eliquis, Plavix at home  Patient was transitioned to a heparin drip for possible catheterization  Patient had no chest pain overnight, and was further evaluated with echocardiogram and nuclear medicine stress test   Patient's echo showed ejection fraction of 60% with no wall motion abnormality  Patient's stress test showed a large area of reversibility/ischemia along with transient ischemic dilation which suggests presence of multivessel coronary artery disease    During the 2nd night of hospital stay patient had repeat episode of chest pain which was resolved with 2 doses of sublingual nitroglycerin  Repeat troponins were still elevated  He was scheduled for cath in a   But due to his anatomy and prior interventions, a his catheterization was deemed to be high risk and the decision was made to transfer patient to [place] for cardiac catheterization  Patient has had no more episodes of chest pain  He will be transferred to [facility] today for a scheduled cardiac catheterization tomorrow  Condition at Discharge: stable     Discharge Day Visit / Exam:     Subjective:  Patient states he feels well, no episodes of chest pain overnight  Vitals: Blood Pressure: 133/64 (10/02/22 1517)  Pulse: 63 (10/02/22 1517)  Temperature: 98 3 °F (36 8 °C) (10/02/22 1517)  Temp Source: Oral (10/02/22 0757)  Respirations: 18 (10/02/22 1517)  Height: 5' 8" (172 7 cm) (09/29/22 1355)  Weight - Scale: 129 kg (284 lb) (09/29/22 1355)  SpO2: 96 % (10/02/22 1517)  Exam:   Physical Exam  Constitutional:       General: He is not in acute distress  Appearance: He is obese  He is not ill-appearing  HENT:      Head: Normocephalic and atraumatic  Nose: No congestion or rhinorrhea  Mouth/Throat:      Pharynx: No oropharyngeal exudate or posterior oropharyngeal erythema  Eyes:      General: No scleral icterus  Right eye: No discharge  Left eye: No discharge  Cardiovascular:      Rate and Rhythm: Normal rate and regular rhythm  Pulses: Normal pulses  Pulmonary:      Effort: Pulmonary effort is normal  No respiratory distress  Breath sounds: Normal breath sounds  Abdominal:      General: Bowel sounds are normal  There is no distension  Palpations: Abdomen is soft  Tenderness: There is no abdominal tenderness  There is no guarding  Musculoskeletal:      Right lower leg: Edema present  Left lower leg: Edema present  Skin:     Findings: No bruising, lesion or rash  Neurological:      General: No focal deficit present  Mental Status: He is alert and oriented to person, place, and time  Mental status is at baseline  Psychiatric:         Mood and Affect: Mood normal          Behavior: Behavior normal          Thought Content: Thought content normal          Judgment: Judgment normal          Discussion with Family:  Tried calling spouse over the phone, was unable to reach her was no option to leave a voicemail  Discharge instructions/Information to patient and family:   See after visit summary for information provided to patient and family  Provisions for Follow-Up Care:  See after visit summary for information related to follow-up care and any pertinent home health orders  Planned Readmission:  No     Discharge Medications:  See after visit summary for reconciled discharge medications provided to patient and family        ** Please Note: This note has been constructed using a voice recognition system **

## 2022-10-02 NOTE — Clinical Note
The saphenous vein graft was selectively engaged, injected and visualized  Multiple views of the injected vessel were taken

## 2022-10-02 NOTE — PLAN OF CARE
Problem: PAIN - ADULT  Goal: Verbalizes/displays adequate comfort level or baseline comfort level  Description: Interventions:  - Encourage patient to monitor pain and request assistance  - Assess pain using appropriate pain scale  - Administer analgesics based on type and severity of pain and evaluate response  - Implement non-pharmacological measures as appropriate and evaluate response  - Consider cultural and social influences on pain and pain management  - Notify physician/advanced practitioner if interventions unsuccessful or patient reports new pain  Outcome: Progressing     Problem: INFECTION - ADULT  Goal: Absence or prevention of progression during hospitalization  Description: INTERVENTIONS:  - Assess and monitor for signs and symptoms of infection  - Monitor lab/diagnostic results  - Monitor all insertion sites, i e  indwelling lines, tubes, and drains  - Monitor endotracheal if appropriate and nasal secretions for changes in amount and color  - Campbell appropriate cooling/warming therapies per order  - Administer medications as ordered  - Instruct and encourage patient and family to use good hand hygiene technique  - Identify and instruct in appropriate isolation precautions for identified infection/condition  Outcome: Progressing  Goal: Absence of fever/infection during neutropenic period  Description: INTERVENTIONS:  - Monitor WBC    Outcome: Progressing     Problem: SAFETY ADULT  Goal: Patient will remain free of falls  Description: INTERVENTIONS:  - Educate patient/family on patient safety including physical limitations  - Instruct patient to call for assistance with activity   - Consult OT/PT to assist with strengthening/mobility   - Keep Call bell within reach  - Keep bed low and locked with side rails adjusted as appropriate  - Keep care items and personal belongings within reach  - Initiate and maintain comfort rounds  - Make Fall Risk Sign visible to staff  - Offer Toileting every 2 Hours, in advance of need  - Apply yellow socks and bracelet for high fall risk patients  - Consider moving patient to room near nurses station  Outcome: Progressing  Goal: Maintain or return to baseline ADL function  Description: INTERVENTIONS:  -  Assess patient's ability to carry out ADLs; assess patient's baseline for ADL function and identify physical deficits which impact ability to perform ADLs (bathing, care of mouth/teeth, toileting, grooming, dressing, etc )  - Assess/evaluate cause of self-care deficits   - Assess range of motion  - Assess patient's mobility; develop plan if impaired  - Assess patient's need for assistive devices and provide as appropriate  - Encourage maximum independence but intervene and supervise when necessary  - Involve family in performance of ADLs  - Assess for home care needs following discharge   - Consider OT consult to assist with ADL evaluation and planning for discharge  - Provide patient education as appropriate  Outcome: Progressing  Goal: Maintains/Returns to pre admission functional level  Description: INTERVENTIONS:  - Perform BMAT or MOVE assessment daily    - Set and communicate daily mobility goal to care team and patient/family/caregiver  - Collaborate with rehabilitation services on mobility goals if consulted  - Perform Range of Motion 3 times a day  - Reposition patient every 2 hours    - Dangle patient 3 times a day  - Stand patient 3 times a day  - Ambulate patient 3 times a day  - Out of bed to chair 3 times a day   - Out of bed for meals 3 times a day  - Out of bed for toileting  - Record patient progress and toleration of activity level   Outcome: Progressing     Problem: DISCHARGE PLANNING  Goal: Discharge to home or other facility with appropriate resources  Description: INTERVENTIONS:  - Identify barriers to discharge w/patient and caregiver  - Arrange for needed discharge resources and transportation as appropriate  - Identify discharge learning needs (meds, wound care, etc )  - Arrange for interpretive services to assist at discharge as needed  - Refer to Case Management Department for coordinating discharge planning if the patient needs post-hospital services based on physician/advanced practitioner order or complex needs related to functional status, cognitive ability, or social support system  Outcome: Progressing     Problem: CARDIOVASCULAR - ADULT  Goal: Maintains optimal cardiac output and hemodynamic stability  Description: INTERVENTIONS:  - Monitor I/O, vital signs and rhythm  - Monitor for S/S and trends of decreased cardiac output  - Administer and titrate ordered vasoactive medications to optimize hemodynamic stability  - Assess quality of pulses, skin color and temperature  - Assess for signs of decreased coronary artery perfusion  - Instruct patient to report change in severity of symptoms  Outcome: Progressing  Goal: Absence of cardiac dysrhythmias or at baseline rhythm  Description: INTERVENTIONS:  - Continuous cardiac monitoring, vital signs, obtain 12 lead EKG if ordered  - Administer antiarrhythmic and heart rate control medications as ordered  - Monitor electrolytes and administer replacement therapy as ordered  Outcome: Progressing

## 2022-10-02 NOTE — ASSESSMENT & PLAN NOTE
Resolved  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 + 2 stent placements in may ( since then was on eliquis + asa 325mg )  Background of hypertension, hyperlipidemia , obesity, T2DM  CONSUELO score : 6  EKG: no acute ischemic changes, first degree AV block previously reported in Ekg 1/7/14  Troponins  181>226> 295 w/ deltas 45> 114  Heparin gtt and Asa 325 initiated in the Emergency department , he is on daily plavix  At the moment of my encounter AOx3 , Chest pain free , stable bp , bradycardic 50's range  Cardiology Recommendations: Echocardiogram for heart and valvular function, Orthostatic blood pressures, Lopressor dose decreased to 50 mg BID as patient is bradycardic in 50's  Echocardiogram:  Shows ejection fraction of 60% with normal systolic function and wall motion  Mildly abnormal diastolic function  Mild mitral valve regurgitation and pulmonic valve regurgitation  Nuclear stress test: "Perfusion: There is a left ventricular perfusion defect that is overall large in size and moderate in severity involving the basal to mid anterolateral and inferolateral walls  There is also mild reversibility involving the anterior wall and apex "    Patient has an repeat episode of chest pain in the night of 9/29 which resolved after took sublingual nitroglycerin x2  As per cardiology patient's anatomy and prior interventions makes him high risk and will be transferred to [facility] for cardiac catheterization  Patient has an episode of fever with temp of 100 4 tylenol was given  Temperature has been ranging from 97 7-98 this morning  WBC is normal this a m  Plan:  Patient is being transferred to [facility] for cardiac catheterization  Continue Telemetry monitoring    Continue heparin gtt   Continue aspirin and plavix   Will continue metoprolol at 50 mg, hold parameters HR <50

## 2022-10-03 LAB
ANION GAP SERPL CALCULATED.3IONS-SCNC: 7 MMOL/L (ref 4–13)
ANION GAP SERPL CALCULATED.3IONS-SCNC: 8 MMOL/L (ref 4–13)
APTT PPP: 54 SECONDS (ref 23–37)
BASOPHILS # BLD AUTO: 0.04 THOUSANDS/ΜL (ref 0–0.1)
BASOPHILS NFR BLD AUTO: 1 % (ref 0–1)
BUN SERPL-MCNC: 18 MG/DL (ref 5–25)
BUN SERPL-MCNC: 18 MG/DL (ref 5–25)
CALCIUM SERPL-MCNC: 9.1 MG/DL (ref 8.3–10.1)
CALCIUM SERPL-MCNC: 9.4 MG/DL (ref 8.3–10.1)
CHLORIDE SERPL-SCNC: 103 MMOL/L (ref 96–108)
CHLORIDE SERPL-SCNC: 104 MMOL/L (ref 96–108)
CO2 SERPL-SCNC: 24 MMOL/L (ref 21–32)
CO2 SERPL-SCNC: 25 MMOL/L (ref 21–32)
CREAT SERPL-MCNC: 1.22 MG/DL (ref 0.6–1.3)
CREAT SERPL-MCNC: 1.22 MG/DL (ref 0.6–1.3)
EOSINOPHIL # BLD AUTO: 0.11 THOUSAND/ΜL (ref 0–0.61)
EOSINOPHIL NFR BLD AUTO: 2 % (ref 0–6)
ERYTHROCYTE [DISTWIDTH] IN BLOOD BY AUTOMATED COUNT: 17.7 % (ref 11.6–15.1)
GFR SERPL CREATININE-BSD FRML MDRD: 58 ML/MIN/1.73SQ M
GFR SERPL CREATININE-BSD FRML MDRD: 58 ML/MIN/1.73SQ M
GLUCOSE SERPL-MCNC: 107 MG/DL (ref 65–140)
GLUCOSE SERPL-MCNC: 116 MG/DL (ref 65–140)
GLUCOSE SERPL-MCNC: 147 MG/DL (ref 65–140)
GLUCOSE SERPL-MCNC: 151 MG/DL (ref 65–140)
HCT VFR BLD AUTO: 40.2 % (ref 36.5–49.3)
HGB BLD-MCNC: 12.8 G/DL (ref 12–17)
IMM GRANULOCYTES # BLD AUTO: 0.07 THOUSAND/UL (ref 0–0.2)
IMM GRANULOCYTES NFR BLD AUTO: 1 % (ref 0–2)
LYMPHOCYTES # BLD AUTO: 2.39 THOUSANDS/ΜL (ref 0.6–4.47)
LYMPHOCYTES NFR BLD AUTO: 33 % (ref 14–44)
MCH RBC QN AUTO: 26.4 PG (ref 26.8–34.3)
MCHC RBC AUTO-ENTMCNC: 31.8 G/DL (ref 31.4–37.4)
MCV RBC AUTO: 83 FL (ref 82–98)
MONOCYTES # BLD AUTO: 1.19 THOUSAND/ΜL (ref 0.17–1.22)
MONOCYTES NFR BLD AUTO: 17 % (ref 4–12)
NEUTROPHILS # BLD AUTO: 3.4 THOUSANDS/ΜL (ref 1.85–7.62)
NEUTS SEG NFR BLD AUTO: 46 % (ref 43–75)
NRBC BLD AUTO-RTO: 0 /100 WBCS
PLATELET # BLD AUTO: 183 THOUSANDS/UL (ref 149–390)
PMV BLD AUTO: 11.3 FL (ref 8.9–12.7)
POTASSIUM SERPL-SCNC: 3.5 MMOL/L (ref 3.5–5.3)
POTASSIUM SERPL-SCNC: 3.9 MMOL/L (ref 3.5–5.3)
RBC # BLD AUTO: 4.84 MILLION/UL (ref 3.88–5.62)
SODIUM SERPL-SCNC: 135 MMOL/L (ref 135–147)
SODIUM SERPL-SCNC: 136 MMOL/L (ref 135–147)
WBC # BLD AUTO: 7.2 THOUSAND/UL (ref 4.31–10.16)

## 2022-10-03 PROCEDURE — C1887 CATHETER, GUIDING: HCPCS | Performed by: INTERNAL MEDICINE

## 2022-10-03 PROCEDURE — 99152 MOD SED SAME PHYS/QHP 5/>YRS: CPT | Performed by: INTERNAL MEDICINE

## 2022-10-03 PROCEDURE — 82948 REAGENT STRIP/BLOOD GLUCOSE: CPT

## 2022-10-03 PROCEDURE — C1769 GUIDE WIRE: HCPCS | Performed by: INTERNAL MEDICINE

## 2022-10-03 PROCEDURE — 4A023N7 MEASUREMENT OF CARDIAC SAMPLING AND PRESSURE, LEFT HEART, PERCUTANEOUS APPROACH: ICD-10-PCS | Performed by: INTERNAL MEDICINE

## 2022-10-03 PROCEDURE — 93455 CORONARY ART/GRFT ANGIO S&I: CPT | Performed by: INTERNAL MEDICINE

## 2022-10-03 PROCEDURE — 85730 THROMBOPLASTIN TIME PARTIAL: CPT | Performed by: INTERNAL MEDICINE

## 2022-10-03 PROCEDURE — 80048 BASIC METABOLIC PNL TOTAL CA: CPT | Performed by: INTERNAL MEDICINE

## 2022-10-03 PROCEDURE — C1894 INTRO/SHEATH, NON-LASER: HCPCS | Performed by: INTERNAL MEDICINE

## 2022-10-03 PROCEDURE — 85025 COMPLETE CBC W/AUTO DIFF WBC: CPT | Performed by: INTERNAL MEDICINE

## 2022-10-03 PROCEDURE — C1760 CLOSURE DEV, VASC: HCPCS | Performed by: INTERNAL MEDICINE

## 2022-10-03 PROCEDURE — 99153 MOD SED SAME PHYS/QHP EA: CPT | Performed by: INTERNAL MEDICINE

## 2022-10-03 PROCEDURE — B2111ZZ FLUOROSCOPY OF MULTIPLE CORONARY ARTERIES USING LOW OSMOLAR CONTRAST: ICD-10-PCS | Performed by: INTERNAL MEDICINE

## 2022-10-03 PROCEDURE — 99222 1ST HOSP IP/OBS MODERATE 55: CPT | Performed by: INTERNAL MEDICINE

## 2022-10-03 PROCEDURE — B2131ZZ FLUOROSCOPY OF MULTIPLE CORONARY ARTERY BYPASS GRAFTS USING LOW OSMOLAR CONTRAST: ICD-10-PCS | Performed by: INTERNAL MEDICINE

## 2022-10-03 PROCEDURE — B2181ZZ FLUOROSCOPY OF LEFT INTERNAL MAMMARY BYPASS GRAFT USING LOW OSMOLAR CONTRAST: ICD-10-PCS | Performed by: INTERNAL MEDICINE

## 2022-10-03 DEVICE — ANGIO-SEAL VIP VASCULAR CLOSURE DEVICE
Type: IMPLANTABLE DEVICE | Status: FUNCTIONAL
Brand: ANGIO-SEAL

## 2022-10-03 DEVICE — PERCLOSE™ PROSTYLE™ SUTURE-MEDIATED CLOSURE AND REPAIR SYSTEM
Type: IMPLANTABLE DEVICE | Status: FUNCTIONAL
Brand: PERCLOSE™ PROSTYLE™

## 2022-10-03 RX ORDER — MIDAZOLAM HYDROCHLORIDE 2 MG/2ML
INJECTION, SOLUTION INTRAMUSCULAR; INTRAVENOUS AS NEEDED
Status: DISCONTINUED | OUTPATIENT
Start: 2022-10-03 | End: 2022-10-03 | Stop reason: HOSPADM

## 2022-10-03 RX ORDER — LIDOCAINE HYDROCHLORIDE 10 MG/ML
INJECTION, SOLUTION EPIDURAL; INFILTRATION; INTRACAUDAL; PERINEURAL AS NEEDED
Status: DISCONTINUED | OUTPATIENT
Start: 2022-10-03 | End: 2022-10-03 | Stop reason: HOSPADM

## 2022-10-03 RX ORDER — SODIUM CHLORIDE 9 MG/ML
100 INJECTION, SOLUTION INTRAVENOUS CONTINUOUS
Status: DISPENSED | OUTPATIENT
Start: 2022-10-03 | End: 2022-10-03

## 2022-10-03 RX ORDER — FENTANYL CITRATE 50 UG/ML
INJECTION, SOLUTION INTRAMUSCULAR; INTRAVENOUS AS NEEDED
Status: DISCONTINUED | OUTPATIENT
Start: 2022-10-03 | End: 2022-10-03 | Stop reason: HOSPADM

## 2022-10-03 RX ORDER — RANOLAZINE 500 MG/1
500 TABLET, EXTENDED RELEASE ORAL EVERY 12 HOURS SCHEDULED
Status: DISCONTINUED | OUTPATIENT
Start: 2022-10-03 | End: 2022-10-04 | Stop reason: HOSPADM

## 2022-10-03 RX ORDER — ONDANSETRON 2 MG/ML
4 INJECTION INTRAMUSCULAR; INTRAVENOUS EVERY 6 HOURS PRN
Status: DISCONTINUED | OUTPATIENT
Start: 2022-10-03 | End: 2022-10-04 | Stop reason: HOSPADM

## 2022-10-03 RX ADMIN — PREGABALIN 100 MG: 100 CAPSULE ORAL at 21:39

## 2022-10-03 RX ADMIN — Medication 2 TABLET: at 12:53

## 2022-10-03 RX ADMIN — PREGABALIN 100 MG: 100 CAPSULE ORAL at 12:53

## 2022-10-03 RX ADMIN — CLONAZEPAM 0.5 MG: 0.5 TABLET ORAL at 12:53

## 2022-10-03 RX ADMIN — INSULIN HUMAN 7 UNITS: 100 INJECTION, SOLUTION PARENTERAL at 12:58

## 2022-10-03 RX ADMIN — ISOSORBIDE DINITRATE 20 MG: 20 TABLET ORAL at 21:39

## 2022-10-03 RX ADMIN — EZETIMIBE 5 MG: 10 TABLET ORAL at 12:53

## 2022-10-03 RX ADMIN — HEPARIN SODIUM 11.1 UNITS/KG/HR: 10000 INJECTION, SOLUTION INTRAVENOUS at 02:33

## 2022-10-03 RX ADMIN — LEVOTHYROXINE SODIUM 88 MCG: 88 TABLET ORAL at 06:27

## 2022-10-03 RX ADMIN — METOPROLOL TARTRATE 50 MG: 50 TABLET, FILM COATED ORAL at 12:53

## 2022-10-03 RX ADMIN — INSULIN DETEMIR 20 UNITS: 100 INJECTION, SOLUTION SUBCUTANEOUS at 21:39

## 2022-10-03 RX ADMIN — ASPIRIN 81 MG: 81 TABLET, COATED ORAL at 10:22

## 2022-10-03 RX ADMIN — RANOLAZINE 500 MG: 500 TABLET, FILM COATED, EXTENDED RELEASE ORAL at 21:40

## 2022-10-03 RX ADMIN — CLOPIDOGREL BISULFATE 75 MG: 75 TABLET ORAL at 10:22

## 2022-10-03 RX ADMIN — VITAM B12 100 MCG: 100 TAB at 12:53

## 2022-10-03 RX ADMIN — PREGABALIN 100 MG: 100 CAPSULE ORAL at 16:52

## 2022-10-03 RX ADMIN — SODIUM CHLORIDE 100 ML/HR: 0.9 INJECTION, SOLUTION INTRAVENOUS at 12:54

## 2022-10-03 RX ADMIN — CLONAZEPAM 0.5 MG: 0.5 TABLET ORAL at 16:52

## 2022-10-03 RX ADMIN — ISOSORBIDE DINITRATE 20 MG: 20 TABLET ORAL at 12:54

## 2022-10-03 RX ADMIN — METOPROLOL TARTRATE 50 MG: 50 TABLET, FILM COATED ORAL at 21:39

## 2022-10-03 RX ADMIN — ATORVASTATIN CALCIUM 80 MG: 80 TABLET, FILM COATED ORAL at 21:39

## 2022-10-03 RX ADMIN — Medication 2 TABLET: at 16:52

## 2022-10-03 RX ADMIN — INSULIN HUMAN 7 UNITS: 100 INJECTION, SOLUTION PARENTERAL at 16:53

## 2022-10-03 RX ADMIN — ISOSORBIDE DINITRATE 20 MG: 20 TABLET ORAL at 16:54

## 2022-10-03 RX ADMIN — Medication 2000 UNITS: at 12:53

## 2022-10-03 NOTE — DISCHARGE INSTRUCTIONS
PCP should check BMP in 1-2 weeks    1  Please see the post cardiac catheterization dishcarge instructions  No heavy lifting, greater than 10 lbs  or strenuous  activity for 5 days     2  Remove band aid tomorrow  Shower and wash area- groin gently with soap and water- beginning tomorrow  Rinse and pat dry  Apply new water seal band aid  Repeat this process for 5 days  No powders, creams lotions or antibiotic ointments  for 5 days  No tub baths, hot tubs or swimming for 5 days  3  Please call our office ([phone]) if you have any fever, redness, swelling, discharge from your groin access site  4 No driving for 2 days    5   Angioseal Booklet

## 2022-10-03 NOTE — ASSESSMENT & PLAN NOTE
· History of TIMOTHY to OM2 in 2009, status post CABG x3 in 2017, status post TIMOTHY x2 to LM and circumflex at May 2022  · Continue aspirin, Plavix, statin, metoprolol, lisinopril, Isordil

## 2022-10-03 NOTE — CONSULTS
Consultation - General Cardiology Team   Sue Ozuna 67 y o  male MRN: [id number]  Unit/Bed#: [facility] 726-01 Encounter: [id number]      Inpatient consult to Cardiology  Consult performed by: Sudhakar Mckeon MD  Consult ordered by: Nataly Rich MD        PCP: Miky Joyce DO   Outpatient Cardiologist: Goes to the [address] [place] [address] for care     History of Present Illness   Physician Requesting Consult: Dre Gan MD  Reason for Consult / Principal Problem: Chest Pain     HPI: Sue Ozuna is a 67y o  year old male with a history of MI 1989, drug eluting stents in LCX and left main in 2009,  triple vessel CABG in 2017, HTN, HLD,  insulin dependent T2DM and hypothyroidism who present to [facility] [place] ED with chest pain  Patient states that he was going to his bird feeder outside when he began to develop chest tightness  He subsequently began to have intermittent blurry vision and lightheadedness  He checked his BP and found it to be 102/50  At this time he took isordil which slightly improved his sx  He than called his doctor who told him to go to the ED  When he got to [facility] ED he was found to have elevated trops that peaked at 295  He was started on a heparin drip with plans for the cath lab  However based on his extensive cardiac history he was transferred here for cath  Assessment and Plan:   1  Chest pain likely secondary to an ischemia event  o 09/29 Echo: Left ventricular ejection fraction is 60%  Systolic function is normal  Diastolic function is mildly abnormal, consistent with grade I (abnormal) relaxation  o 09/29 Stress test:    i  Perfusion: There is a left ventricular perfusion defect that is overall large in size and moderate in severity involving the basal to mid anterolateral and inferolateral walls  There is also mild reversibility involving the anterior wall and apex    ii  Stress Function: Left ventricular function post-stress is normal  Post-stress ejection fraction is 60 %   iii  Perfusion Defect Conclusion: The stress/rest perfusion ratio is 1 14   There is no quantitative evidence of transient ischemic dilation (TID) but visually appreciated  o Patient NPO overnight for cath lab today   o Heparin gtt, ASA 81 mg and Plavix 75 mg Bid   o Continue Metoprolol 50 mg BID   o Add Ranolazine 500 mg BID   2  Afib   o Presented after bypass  ~ 5 years ago patient is on Plavix and Eliquis at home   o Continue Metoprolol 50 mg BID   o ASA 81 mg and Plavix 75 mg Bid   3  HTN  o Lisinopril and Chorthalidone held for cath today    o Continue Metoprolol 50 mg BID   4  HLD   o Lipitor 80 mg       Subjective:   Pt seen and examined at bedside  No acute event overnight  Patient has no chest pain, SOB, lightheadedness or palpitations at rest  Patient reports STEWARD and Chest tightness with excertion but not at rest or with light movement  No concerns per nursing staff  Review of Systems   Constitutional: Negative for chills and fever  HENT: Negative for ear pain and sore throat  Eyes: Negative for pain and visual disturbance  Respiratory: Positive for chest tightness  Negative for cough and shortness of breath  Cardiovascular: Positive for leg swelling  Negative for chest pain and palpitations  Gastrointestinal: Negative for abdominal pain and vomiting  Genitourinary: Negative for dysuria and hematuria  Musculoskeletal: Negative for arthralgias and back pain  Skin: Negative for color change and rash  Neurological: Negative for seizures and syncope  All other systems reviewed and are negative  ROS as noted above         Historical Information   Past Medical History:   Diagnosis Date    Atrial fibrillation ([facility] )     CAD (coronary artery disease)     Diabetes mellitus ([facility] )     IDDM    Heart attack ([facility] )     Heart disease     Hyperlipidemia     Hypertension     Myocardial infarction ([facility] ) 1989    Neuropathy     S/P triple vessel bypass     Sleep apnea     NO CPAP Past Surgical History:   Procedure Laterality Date    BACK SURGERY  2010    benign tumor removed from spine    CORONARY ANGIOPLASTY  2009    SHOULDER SURGERY Right     TONSILLECTOMY       Social History     Substance and Sexual Activity   Alcohol Use Not Currently    Comment: social     Social History     Substance and Sexual Activity   Drug Use No     Social History     Tobacco Use   Smoking Status Former Smoker   Smokeless Tobacco Never Used   Tobacco Comment    quit 1989     Family History: non-contributory    Meds/Allergies   Hospital Medications:   Current Facility-Administered Medications   Medication Dose Route Frequency    acetaminophen (TYLENOL) tablet 650 mg  650 mg Oral Q6H PRN    aspirin (ECOTRIN LOW STRENGTH) EC tablet 81 mg  81 mg Oral Daily    atorvastatin (LIPITOR) tablet 80 mg  80 mg Oral HS    calcium carbonate-vitamin D (OSCAL-D) 500 mg-200 units per tablet 2 tablet  2 tablet Oral BID    cholecalciferol (VITAMIN D3) tablet 2,000 Units  2,000 Units Oral Daily    clonazePAM (KlonoPIN) tablet 0 5 mg  0 5 mg Oral BID    clopidogrel (PLAVIX) tablet 75 mg  75 mg Oral Daily    cyanocobalamin (VITAMIN B-12) tablet 100 mcg  100 mcg Oral Daily    ezetimibe (ZETIA) tablet 5 mg  5 mg Oral Daily    heparin (porcine) 25,000 units in 0 45% NaCl 250 mL infusion (premix)  3-20 Units/kg/hr (Order-Specific) Intravenous Titrated    insulin detemir (LEVEMIR) subcutaneous injection 20 Units  20 Units Subcutaneous HS    insulin regular (HumuLIN R,NovoLIN R) injection 7 Units  7 Units Subcutaneous TID With Meals    isosorbide dinitrate (ISORDIL) tablet 20 mg  20 mg Oral TID    levothyroxine tablet 88 mcg  88 mcg Oral Early Morning    metoprolol tartrate (LOPRESSOR) tablet 50 mg  50 mg Oral Q12H [address]    morphine injection 2 mg  2 mg Intravenous Q4H PRN    nitroglycerin (NITROSTAT) SL tablet 0 4 mg  0 4 mg Sublingual Q5 Min PRN    pregabalin (LYRICA) capsule 100 mg  100 mg Oral TID     Home Medications:   Medications Prior to Admission   Medication    apixaban (ELIQUIS) 5 mg    aspirin 325 mg tablet    atorvastatin (LIPITOR) 80 mg tablet    calcium-vitamin D (OSCAL) 250-125 MG-UNIT per tablet    chlorthalidone 25 mg tablet    cholecalciferol (VITAMIN D3) 1,000 units tablet    clonazePAM (KlonoPIN) 0 5 mg tablet    cyanocobalamin 1000 MCG tablet    EMPAGLIFLOZIN-LINAGLIPTIN PO    ezetimibe (ZETIA) 10 mg tablet    gabapentin (NEURONTIN) 400 mg capsule    insulin aspart (NovoLOG) 100 units/mL injection    insulin detemir (LEVEMIR) 100 units/mL subcutaneous injection    insulin glargine (Lantus) 100 units/mL subcutaneous injection    insulin regular (HumuLIN R,NovoLIN R) 100 units/mL injection    isosorbide dinitrate (ISORDIL) 20 mg tablet    Levothyroxine Sodium 88 MCG CAPS    lisinopril (ZESTRIL) 40 mg tablet    metFORMIN (GLUCOPHAGE) 1000 MG tablet    metoprolol tartrate (LOPRESSOR) 100 mg tablet    pregabalin (LYRICA) 100 mg capsule       Allergies   Allergen Reactions    Amlodipine Lip Swelling       Objective   Vitals: Blood pressure 118/55, pulse 71, temperature 98 3 °F (36 8 °C), SpO2 95 %  Orthostatic Blood Pressures    Flowsheet Row Most Recent Value   Blood Pressure 118/55 filed at 10/03/2022 5725            Invasive Devices  Report    Peripheral Intravenous Line  Duration           Peripheral IV 10/02/22 Dorsal (posterior); Left Forearm <1 day                Physical Exam  Constitutional:       General: He is awake  Appearance: Normal appearance  HENT:      Head: Normocephalic  Jaw: There is normal jaw occlusion  Eyes:      General: Lids are normal    Cardiovascular:      Rate and Rhythm: Normal rate and regular rhythm  Heart sounds: Normal heart sounds, S1 normal and S2 normal    Pulmonary:      Effort: Pulmonary effort is normal  No tachypnea or bradypnea  Breath sounds: Normal breath sounds and air entry     Abdominal:      General: Bowel sounds are normal       Palpations: Abdomen is soft  Tenderness: There is no abdominal tenderness  Musculoskeletal:      Right lower leg: Edema present  Left lower leg: Edema present  Skin:     General: Skin is warm  Neurological:      Mental Status: He is alert and oriented to person, place, and time  Psychiatric:         Attention and Perception: Attention normal          Behavior: Behavior is cooperative  Lab Results: I have personally reviewed pertinent lab results  Results from last 7 days   Lab Units 10/03/22  0434 10/01/22  0617 09/30/22  0520   POTASSIUM mmol/L 3 5 3 8 3 5   CO2 mmol/L 25 23 26   CHLORIDE mmol/L 103 103 103   BUN mg/dL 18 21 26*   CREATININE mg/dL 1 22 1 15 1 23     Results from last 7 days   Lab Units 10/03/22  0434 10/02/22  0744 10/01/22  0617   HEMOGLOBIN g/dL 12 8 13 4 12 7   HEMATOCRIT % 40 2 41 0 39 2   PLATELETS Thousands/uL 183 170 183     Results from last 7 days   Lab Units 10/03/22  0434 10/02/22  0505 10/01/22  0617   PTT seconds 54* 65* 64*     Results from last 7 days   Lab Units 09/29/22  0713   HDL mg/dL 25*   LDL CALC mg/dL 27   TRIGLYCERIDES mg/dL 114         Imaging: I have personally reviewed pertinent reports  Telemetry:   No acute events     EKG:   Date: 09/29  Interpretation:     Sinus rhythm with 1st degree A-V block with occasional Premature ventricular complexes  Left axis deviation  Inferior infarct (cited on or before 01-JUL-2014)  Abnormal ECG  When compared with ECG of 28-SEP-2022 17:33,  T wave inversion no longer evident in Anterior leads  Confirmed by Afia Stout ([id number]) on 9/30/2022 1:33:24 PM    Previous STRESS TEST:  No results found for this or any previous visit  No results found for this or any previous visit  Results for orders placed during the hospital encounter of 09/28/22    NM myocardial perfusion spect (rx stress and/or rest)    Interpretation Summary    Stress ECG: The ECG was negative for ischemia   The stress ECG is negative for ischemia after pharmacologic vasodilation, without reproduction of symptoms    Perfusion: There is a left ventricular perfusion defect that is overall large in size and moderate in severity involving the basal to mid anterolateral and inferolateral walls  There is also mild reversibility involving the anterior wall and apex    Stress Function: Left ventricular function post-stress is normal  Post-stress ejection fraction is 60 %    Perfusion Defect Conclusion: The stress/rest perfusion ratio is 1 14   There is no quantitative evidence of transient ischemic dilation (TID) but visually appreciated  Large area of reversibility/ischemia along with transient ischemic dilatation which is appreciable visually suggests the presence of multivessel CAD  Previous Cath/PCI:  No results found for this or any previous visit  ECHO:  No results found for this or any previous visit  No results found for this or any previous visit  HOLTER  No results found for this or any previous visit  VTE Prophylaxis: Heparin       Case discussed and reviewed with Dr Sharifa Osorio and is pending their agreement of the assessment and plan  Thank you for involving us in the care of your patient  [address]  PGY-1  [facility]    ==========================================================================================    ** Please Note: Fluency DirectDictation voice to text software may have been used in the creation of this document   **

## 2022-10-03 NOTE — PLAN OF CARE
Physical Therapy  Visit Type: initial evaluation  Precautions:  Medical precautions:  fall risk; standard precautions.    Lines:     Basic: telemetry  Spine:     Trunk: L4 compression fracture.  Safety Measures: bed alarm    SUBJECTIVE  Patient agreed to participate in therapy this date.  Patient laying in bed. Just finished his breakfast. Nursing assistant states he had a muscle relaxer about 20 minutes prior to me entering.     OBJECTIVE     Oriented to person, place and time     Affect/Behavior: appropriate and cooperative  Patient activity tolerance: 1 to 2 activity to rest  Functional Communication/Cognition    Overall status:  Within functional limits    Form of communication:  Verbal    Commands: follows all commands and directions consistently.    Safety judgement: good awareness of safety precautions.    Awareness of deficits: fully aware of deficits.  PostureStanding:  Forward head and trunk lean anterior  Strength (out of 5 unless otherwise indicated)   Comments / Details:  Gross LE strength 3- to 3+/5. Patient struggles with extended knees in standing to bear weight.    Bed Mobility:      Rolling right: modified independent      Supine to sit: modified independent    Sit to supine: modified independent  Transfers:    Assistive devices: 2-wheeled walker    Sit to stand: minimal assist    Stand to sit: minimal assist  Training completed:    Tasks: sit to stand    Education details: body mechanics, patient safety and patient requires additional training    Verbal cues needed for hand placement (attempts to pull on walker).  Gait/Ambulation:     Assistance: minimal assist   Assistive device: 2-wheeled walker    Distance (ft): 5    Pattern: step to    Type: antalgic, decreased deion and difficulty advancing assistive device    Stance phase: Left: decreased heel strike; Right: decreased heel strike    Gait description: Left: decreased stance time; Right: decreased stance time    Surface: even  Training  Problem: PAIN - ADULT  Goal: Verbalizes/displays adequate comfort level or baseline comfort level  Description: Interventions:  - Encourage patient to monitor pain and request assistance  - Assess pain using appropriate pain scale  - Administer analgesics based on type and severity of pain and evaluate response  - Implement non-pharmacological measures as appropriate and evaluate response  - Consider cultural and social influences on pain and pain management  - Notify physician/advanced practitioner if interventions unsuccessful or patient reports new pain  Outcome: Progressing     Problem: INFECTION - ADULT  Goal: Absence or prevention of progression during hospitalization  Description: INTERVENTIONS:  - Assess and monitor for signs and symptoms of infection  - Monitor lab/diagnostic results  - Monitor all insertion sites, i e  indwelling lines, tubes, and drains  - Monitor endotracheal if appropriate and nasal secretions for changes in amount and color  - [place] appropriate cooling/warming therapies per order  - Administer medications as ordered  - Instruct and encourage patient and family to use good hand hygiene technique  - Identify and instruct in appropriate isolation precautions for identified infection/condition  Outcome: Progressing  Goal: Absence of fever/infection during neutropenic period  Description: INTERVENTIONS:  - Monitor WBC    Outcome: Progressing     Problem: SAFETY ADULT  Goal: Patient will remain free of falls  Description: INTERVENTIONS:  - Educate patient/family on patient safety including physical limitations  - Instruct patient to call for assistance with activity   - Consult OT/PT to assist with strengthening/mobility   - Keep Call bell within reach  - Keep bed low and locked with side rails adjusted as appropriate  - Keep care items and personal belongings within reach  - Initiate and maintain comfort rounds  - Make Fall Risk Sign visible to staff  - Offer Toileting every  Hours, Completed:    Tasks: gait training on level surfaces and assistive device use    Education details: patient requires additional training    Gait distance kept short due to weak LEs. Patient's knees buckle. Unsafe for longer distances at this time.      Interventions     Training provided: activity tolerance and gait training    Skilled input: Verbal instruction/cues and tactile instruction/cues  Verbal Consent: Writer verbally educated and received verbal consent for hand placement, positioning of patient, and techniques to be performed today from patient for therapist position for techniques and hand placement and palpation for techniques as described above and how they are pertinent to the patient's plan of care.       ASSESSMENT    Impairments: strength, balance deficits, pain, activity tolerance and endurance  Functional Limitations: sit to/from stand transfers, stand pivot transfers and ambulation  Álvaro's impairments are consistent with compression fracture (L4). Today's treatment focused on maximizing function by working on gait and transfers. He was motivated to participate, but tolerance was poor due to pain and weakness. Gait was kept short due to safety concerns of him falling. Will continue to work on this at upcoming sessions.       Discharge Recommendations  Recommendation for Discharge: PT WI: SNF, Home therapy         PT Identified Barriers to Discharge: weakness, gait, pain   Skilled therapy is required to address these limitations in attempt to maximize the patient's independence.    Pain at end of session:  8/10, location: low back  Predicted patient presentation: Moderate (evolving) - Patient comorbidities and complexities, as defined above, may have varying impact on steady progress for prescribed plan of care.    End of Session:   Location: in bed  Safety measures: alarm system in place/re-engaged, bed rails x2 and call light within reach    PLAN    Suggestions for next session as indicated:  in advance of need  - Initiate/Maintain alarm  - Obtain necessary fall risk management equipment:   - Apply yellow socks and bracelet for high fall risk patients  - Consider moving patient to room near nurses station  Outcome: Progressing  Goal: Maintain or return to baseline ADL function  Description: INTERVENTIONS:  -  Assess patient's ability to carry out ADLs; assess patient's baseline for ADL function and identify physical deficits which impact ability to perform ADLs (bathing, care of mouth/teeth, toileting, grooming, dressing, etc )  - Assess/evaluate cause of self-care deficits   - Assess range of motion  - Assess patient's mobility; develop plan if impaired  - Assess patient's need for assistive devices and provide as appropriate  - Encourage maximum independence but intervene and supervise when necessary  - Involve family in performance of ADLs  - Assess for home care needs following discharge   - Consider OT consult to assist with ADL evaluation and planning for discharge  - Provide patient education as appropriate  Outcome: Progressing  Goal: Maintains/Returns to pre admission functional level  Description: INTERVENTIONS:  - Perform BMAT or MOVE assessment daily    - Set and communicate daily mobility goal to care team and patient/family/caregiver  - Collaborate with rehabilitation services on mobility goals if consulted  - Perform Range of Motion  times a day  - Reposition patient every  hours    - Dangle patient  times a day  - Stand patient  times a day  - Ambulate patient  times a day  - Out of bed to chair  times a day   - Out of bed for meals  times a day  - Out of bed for toileting  - Record patient progress and toleration of activity level   Outcome: Progressing     Problem: DISCHARGE PLANNING  Goal: Discharge to home or other facility with appropriate resources  Description: INTERVENTIONS:  - Identify barriers to discharge w/patient and caregiver  - Arrange for needed discharge resources and PT Frequency: Once a day, 5 days/week  Frequency Comments: Gait x 5' with 2WW, knees buckle, unsteady    Interventions: balance, body mechanics, gait training, strengthening, bed mobility, HEP train/position, endurance training and functional transfer training  Agreement to plan and goals: patient agrees with goals and treatment plan        GOALS  Long Term Goals: (to be met by time of discharge from hospital)  Sit to stand: Patient will complete sit to stand transfer with 2-wheeled walker, modified independent.   Ambulation (even): Patient will ambulate on even surface for 25 feet with 2-wheeled walker and gait belt, minimal assist.   Exercise home program: patient to demonstrate and state appropriate assist with exercises as instructed.     Documented in the chart in the following areas: Prior Level of Function. Pain. Assessment.      Therapy procedure time and total treatment time can be found documented on the Time Entry flowsheet   transportation as appropriate  - Identify discharge learning needs (meds, wound care, etc )  - Arrange for interpretive services to assist at discharge as needed  - Refer to Case Management Department for coordinating discharge planning if the patient needs post-hospital services based on physician/advanced practitioner order or complex needs related to functional status, cognitive ability, or social support system  Outcome: Progressing     Problem: Knowledge Deficit  Goal: Patient/family/caregiver demonstrates understanding of disease process, treatment plan, medications, and discharge instructions  Description: Complete learning assessment and assess knowledge base    Interventions:  - Provide teaching at level of understanding  - Provide teaching via preferred learning methods  Outcome: Progressing     Problem: Potential for Falls  Goal: Patient will remain free of falls  Description: INTERVENTIONS:  - Educate patient/family on patient safety including physical limitations  - Instruct patient to call for assistance with activity   - Consult OT/PT to assist with strengthening/mobility   - Keep Call bell within reach  - Keep bed low and locked with side rails adjusted as appropriate  - Keep care items and personal belongings within reach  - Initiate and maintain comfort rounds  - Make Fall Risk Sign visible to staff  - Offer Toileting every  Hours, in advance of need  - Initiate/Maintain alarm  - Obtain necessary fall risk management equipment:   - Apply yellow socks and bracelet for high fall risk patients  - Consider moving patient to room near nurses station  Outcome: Progressing

## 2022-10-03 NOTE — ASSESSMENT & PLAN NOTE
· Diagnosed after bypass surgery  · Currently in normal sinus rhythm  · Continue metoprolol, anticoagulated with Eliquis, currently on heparin drip

## 2022-10-03 NOTE — ASSESSMENT & PLAN NOTE
Lab Results   Component Value Date    HGBA1C 6 8 (H) 09/29/2022       Recent Labs     10/02/22  1143 10/02/22  2228 10/03/22  0632 10/03/22  1258   POCGLU 208* 130 116 151*       Blood Sugar Average: Last 72 hrs:  · Home regimen Lantus 30 units at bedtime and Humalog 10 units t i d   · (P) 132 [id number] Levemir 20 units HS, Humalog 7 units t i d  with meal  · Diabetic diet  · Hypoglycemia protocol

## 2022-10-03 NOTE — PROGRESS NOTES
[facility]  Progress Note - Juli Shah 1950, 67 y o  male MRN: [id number]  [facility]: [id number]  Primary Care Provider: Viv Lion DO   Date and time admitted to hospital: 10/2/2022  7:51 PM    * Chest pain  Assessment & Plan  · Patient has a history of CABG x3 and stents last 1 placed in May 2022  · Patient presented with midsternal chest pressure  · Elevated troponin with no ischemic EKG changes  · Echo showed ejection fraction 60%, mildly abnormal diastolic function  · Nuclear stress test abnormal  · Evaluated by Cardiology, and Interventional Cardiology    Patient is too high risks for a CT Migue and recommended transferred to [place]  · Continue heparin drip, aspirin, Plavix, statin, metoprolol, Isordil  · Telemetry  · Patient undergoing cardiac cath 10/3; follow up on results     Atrial fibrillation [facility])  Assessment & Plan  · Diagnosed after bypass surgery  · Currently in normal sinus rhythm  · Continue metoprolol, anticoagulated with Eliquis, currently on heparin drip    Sinus bradycardia  Assessment & Plan  · Due to bradycardia his metoprolol 100 mg b i d  was decreased to 50 mg b i d  with improvement of heart rate  · Continue telemetry    Hypertension  Assessment & Plan  · Continue chlorthalidone, lisinopril, metoprolol    History of coronary artery disease  Assessment & Plan  · History of TIMOTHY to OM2 in 2009, status post CABG x3 in 2017, status post TIMOTHY x2 to LM and circumflex at May 2022  · Continue aspirin, Plavix, statin, metoprolol, lisinopril, Isordil    Type 2 diabetes mellitus ([facility] [address] )  Assessment & Plan  Lab Results   Component Value Date    HGBA1C 6 8 (H) 09/29/2022       Recent Labs     10/02/22  1143 10/02/22  2228 10/03/22  0632 10/03/22  1258   POCGLU 208* 130 116 151*       Blood Sugar Average: Last 72 hrs:  · Home regimen Lantus 30 units at bedtime and Humalog 10 units t i d   · (P) [id number] Haritha 20 units HS, Humalog 7 units t i d  with meal  · Diabetic diet  · Hypoglycemia protocol    Hypothyroidism  Assessment & Plan  · Continue levothyroxine    Fever  Assessment & Plan  · Patient low-grade fever 100 4 on 10/01/2022  · Patient denies any signs of upper respiratory tract infection, GI or  symptoms  · Chest x-ray negative for acute pathology  · Thought to be secondary to atelectasis, started using incentive spirometry  · Afebrile in the past 48 hours    Hyperlipidemia  Assessment & Plan  · Continue statin    Obesity  Assessment & Plan  · Lifestyle modification        VTE Pharmacologic Prophylaxis:   Pharmacologic: Heparin Drip  Mechanical VTE Prophylaxis in Place: Yes    Patient Centered Rounds: I have performed bedside rounds with nursing staff today  Discussions with Specialists or Other Care Team Provider: Yes  Education and Discussions with Family / Patient:Yes  Time Spent for Care: 30 minutes  More than 50% of total time spent on counseling and coordination of care as described above  Current Length of Stay: 1 day(s)  Current Patient Status: Inpatient     Discharge Plan: In next 24 hours s/p cath; IVF, repeat labs in am      Code Status: Level 1 - Full Code      Subjective:   Patient seen Marvin Alamo in bed, post cath  Tolerated well  No pain, no SOB, able to lay flat without difficulty  Objective:     Vitals:   Temp (24hrs), Av 4 °F (36 9 °C), Min:97 6 °F (36 4 °C), Max:99 4 °F (37 4 °C)    Temp:  [97 6 °F (36 4 °C)-99 4 °F (37 4 °C)] 97 6 °F (36 4 °C)  HR:  [59-74] 59  Resp:  [18] 18  BP: (118-178)/() 178/80  SpO2:  [93 %-99 %] 96 %  There is no height or weight on file to calculate BMI  Input and Output Summary (last 24 hours): Intake/Output Summary (Last 24 hours) at 10/3/2022 1440  Last data filed at 10/3/2022 1143  Gross per 24 hour   Intake --   Output 5 ml   Net -5 ml        Physical Exam:     Physical Exam  Vitals and nursing note reviewed     Constitutional: General: He is not in acute distress  Appearance: Normal appearance  HENT:      Head: Normocephalic  Nose: Nose normal       Mouth/Throat:      Mouth: Mucous membranes are moist    Eyes:      Extraocular Movements: Extraocular movements intact  Conjunctiva/sclera: Conjunctivae normal       Pupils: Pupils are equal, round, and reactive to light  Cardiovascular:      Rate and Rhythm: Normal rate and regular rhythm  Pulses: Normal pulses  Pulmonary:      Effort: Pulmonary effort is normal       Breath sounds: Normal breath sounds  Abdominal:      General: Bowel sounds are normal  There is no distension  Palpations: Abdomen is soft  Tenderness: There is no abdominal tenderness  Genitourinary:     Comments: Voiding spontaneously  Musculoskeletal:         General: Normal range of motion  Cervical back: Normal range of motion  Comments: Trace bilateral LE edema   Skin:     General: Skin is warm and dry  Capillary Refill: Capillary refill takes less than 2 seconds  Neurological:      General: No focal deficit present  Mental Status: He is alert and oriented to person, place, and time  Psychiatric:         Mood and Affect: Mood normal          Behavior: Behavior normal          Thought Content:  Thought content normal          Judgment: Judgment normal          Additional Data:     Labs:    Results from last 7 days   Lab Units 10/03/22  0434 10/02/22  0744 10/01/22  0617 09/30/22  0520   WBC Thousand/uL 7 20 7 30 7 41 9 16   HEMOGLOBIN g/dL 12 8 13 4 12 7 12 7   HEMATOCRIT % 40 2 41 0 39 2 39 0   PLATELETS Thousands/uL 183 170 183 194   NEUTROS PCT % 46 61  --  61     Results from last 7 days   Lab Units 10/03/22  1348 10/03/22  0434 10/01/22  0617 09/29/22  0713 09/28/22  1328   SODIUM mmol/L 135 136 135   < > 137   POTASSIUM mmol/L 3 9 3 5 3 8   < > 3 7   CHLORIDE mmol/L 104 103 103   < > 104   CO2 mmol/L 24 25 23   < > 27   BUN mg/dL 18 18 21   < > 23 CREATININE mg/dL 1 22 1 22 1 15   < > 1 13   CALCIUM mg/dL 9 1 9 4 8 9   < > 9 6   TOTAL BILIRUBIN mg/dL  --   --   --   --  0 93   ALK PHOS U/L  --   --   --   --  98   ALT U/L  --   --   --   --  17   AST U/L  --   --   --   --  16    < > = values in this interval not displayed  Results from last 7 days   Lab Units 09/28/22  1328   INR  1 39*         Lab Results   Component Value Date/Time    HGBA1C 6 8 (H) 09/29/2022 12:38 AM    HGBA1C 6 7 (H) 06/01/2022 05:41 AM     Results from last 7 days   Lab Units 10/03/22  1258 10/03/22  0632 10/02/22  2228 10/02/22  1143 10/02/22  0800 10/01/22  2039 10/01/22  1141 10/01/22  0854 09/30/22  2216 09/30/22  1715 09/29/22  2339 09/29/22  1837   POC GLUCOSE mg/dl 151* 116 130 208* 119 122 189* 200* 155* 180* 156* 160*           * I Have Reviewed All Lab Data Listed Above  * Additional Pertinent Lab Tests Reviewed:  All Labs Within Last 24 Hours Reviewed    Imaging:     No orders to display     Imaging Reports Reviewed by myself    Cultures:   Blood Culture: No results found for: BLOODCX  Urine Culture: No results found for: URINECX  Sputum Culture: No components found for: SPUTUMCX  Wound Culture: No results found for: WOUNDCULT    Last 24 Hours Medication List:   Current Facility-Administered Medications   Medication Dose Route Frequency Provider Last Rate    acetaminophen  650 mg Oral Q6H PRN Fransisco Stoner MD      aspirin  81 mg Oral Daily Fransisco Stoner MD      atorvastatin  80 mg Oral HS Fransisco Stoner MD      calcium carbonate-vitamin D  2 tablet Oral BID Fransisco Stoner MD      cholecalciferol  2,000 Units Oral Daily Fransisco Stoner MD      clonazePAM  0 5 mg Oral BID Fransisco Stoner MD      clopidogrel  75 mg Oral Daily Fransisco Stoner MD      cyanocobalamin  100 mcg Oral Daily Fransisco Stoner MD      ezetimibe  5 mg Oral Daily Fransisco Stoner MD      insulin detemir  20 Units Subcutaneous HS MD Jennifer River insulin regular  7 Units Subcutaneous TID With Meals Gayathri Britton, MD      isosorbide dinitrate  20 mg Oral TID Gayathri Britton, MD      levothyroxine  88 mcg Oral Early Morning Gayathri Britton, MD      metoprolol tartrate  50 mg Oral Q12H [address] Gayathri Britton, MD      morphine injection  2 mg Intravenous Q4H PRN Gayathri Britton, MD      nitroglycerin  0 4 mg Sublingual Q5 Min PRN Gayathri Britton, MD      ondansetron  4 mg Intravenous Q6H PRN JULISSA Zheng      pregabalin  100 mg Oral TID Gayathri Britton, MD      sodium chloride  100 mL/hr Intravenous Continuous JERICA ZhengNP 100 mL/hr (10/03/22 1254)        Today, Patient Was Seen By: Javed Sun    ** Please Note: Dragon 360 Dictation voice to text software may have been used in the creation of this document   **

## 2022-10-03 NOTE — ASSESSMENT & PLAN NOTE
· Patient has a history of CABG x3 and stents last 1 placed in May 2022  · Patient presented with midsternal chest pressure  · Elevated troponin with no ischemic EKG changes  · Echo showed ejection fraction 60%, mildly abnormal diastolic function  · Nuclear stress test abnormal  · Evaluated by Cardiology, and Interventional Cardiology    Patient is too high risks for a CT M[place] and recommended transferred to [place]  · Continue heparin drip, aspirin, Plavix, statin, metoprolol, Isordil  · Telemetry

## 2022-10-03 NOTE — RESTORATIVE TECHNICIAN NOTE
Restorative Technician Note      Patient Name: Lamonte Estimable     Note Type: Mobility  Patient Position Upon Consult: Supine  Activity Performed: Ambulated; [id number]; Stood  Assistive Device: Other (Comment) (none)  Education Provided: Yes  Patient Position at End of Consult: Bedside chair;  All needs within reach      Daisha Villarreal BS, Restorative Technician, [employer]

## 2022-10-03 NOTE — ASSESSMENT & PLAN NOTE
· Due to bradycardia his metoprolol 100 mg b i d  was decreased to 50 mg b i d  with improvement of heart rate  · Continue telemetry

## 2022-10-03 NOTE — ASSESSMENT & PLAN NOTE
Lab Results   Component Value Date    HGBA1C 6 8 (H) 09/29/2022       Recent Labs     10/01/22  1141 10/01/22  2039 10/02/22  0800 10/02/22  1143   POCGLU 189* 122 119 208*       Blood Sugar Average: Last 72 hrs:  · Home regimen Lantus 30 units at bedtime and Humalog 10 units t i d   · Continue Levemir 20 units HS, Humalog 7 units t i d  with meal  · Diabetic diet  · Hypoglycemia protocol

## 2022-10-03 NOTE — PLAN OF CARE
Problem: PAIN - ADULT  Goal: Verbalizes/displays adequate comfort level or baseline comfort level  Description: Interventions:  - Encourage patient to monitor pain and request assistance  - Assess pain using appropriate pain scale  - Administer analgesics based on type and severity of pain and evaluate response  - Implement non-pharmacological measures as appropriate and evaluate response  - Consider cultural and social influences on pain and pain management  - Notify physician/advanced practitioner if interventions unsuccessful or patient reports new pain  Outcome: Progressing     Problem: INFECTION - ADULT  Goal: Absence or prevention of progression during hospitalization  Description: INTERVENTIONS:  - Assess and monitor for signs and symptoms of infection  - Monitor lab/diagnostic results  - Monitor all insertion sites, i e  indwelling lines, tubes, and drains  - Monitor endotracheal if appropriate and nasal secretions for changes in amount and color  - [place] appropriate cooling/warming therapies per order  - Administer medications as ordered  - Instruct and encourage patient and family to use good hand hygiene technique  - Identify and instruct in appropriate isolation precautions for identified infection/condition  Outcome: Progressing  Goal: Absence of fever/infection during neutropenic period  Description: INTERVENTIONS:  - Monitor WBC    Outcome: Progressing     Problem: SAFETY ADULT  Goal: Patient will remain free of falls  Description: INTERVENTIONS:  - Educate patient/family on patient safety including physical limitations  - Instruct patient to call for assistance with activity   - Consult OT/PT to assist with strengthening/mobility   - Keep Call bell within reach  - Keep bed low and locked with side rails adjusted as appropriate  - Keep care items and personal belongings within reach  - Initiate and maintain comfort rounds  - Make Fall Risk Sign visible to staff  - Offer Toileting every 2 Hours, in advance of need    - Apply yellow socks and bracelet for high fall risk patients  - Consider moving patient to room near nurses station  Outcome: Progressing  Goal: Maintain or return to baseline ADL function  Description: INTERVENTIONS:  -  Assess patient's ability to carry out ADLs; assess patient's baseline for ADL function and identify physical deficits which impact ability to perform ADLs (bathing, care of mouth/teeth, toileting, grooming, dressing, etc )  - Assess/evaluate cause of self-care deficits   - Assess range of motion  - Assess patient's mobility; develop plan if impaired  - Assess patient's need for assistive devices and provide as appropriate  - Encourage maximum independence but intervene and supervise when necessary  - Involve family in performance of ADLs  - Assess for home care needs following discharge   - Consider OT consult to assist with ADL evaluation and planning for discharge  - Provide patient education as appropriate  Outcome: Progressing  Goal: Maintains/Returns to pre admission functional level  Description: INTERVENTIONS:  - Perform BMAT or MOVE assessment daily    - Set and communicate daily mobility goal to care team and patient/family/caregiver     - Collaborate with rehabilitation services on mobility goals if consulted  - Out of bed for toileting  - Record patient progress and toleration of activity level   Outcome: Progressing     Problem: DISCHARGE PLANNING  Goal: Discharge to home or other facility with appropriate resources  Description: INTERVENTIONS:  - Identify barriers to discharge w/patient and caregiver  - Arrange for needed discharge resources and transportation as appropriate  - Identify discharge learning needs (meds, wound care, etc )  - Arrange for interpretive services to assist at discharge as needed  - Refer to Case Management Department for coordinating discharge planning if the patient needs post-hospital services based on physician/advanced practitioner order or complex needs related to functional status, cognitive ability, or social support system  Outcome: Progressing     Problem: Knowledge Deficit  Goal: Patient/family/caregiver demonstrates understanding of disease process, treatment plan, medications, and discharge instructions  Description: Complete learning assessment and assess knowledge base    Interventions:  - Provide teaching at level of understanding  - Provide teaching via preferred learning methods  Outcome: Progressing     Problem: Potential for Falls  Goal: Patient will remain free of falls  Description: INTERVENTIONS:  - Educate patient/family on patient safety including physical limitations  - Instruct patient to call for assistance with activity   - Consult OT/PT to assist with strengthening/mobility   - Keep Call bell within reach  - Keep bed low and locked with side rails adjusted as appropriate  - Keep care items and personal belongings within reach  - Initiate and maintain comfort rounds  - Make Fall Risk Sign visible to staff  - Offer Toileting every 2 Hours, in advance of need  - Initiate/Maintain bed alarm  - Obtain necessary fall risk management equipment:   - Apply yellow socks and bracelet for high fall risk patients  - Consider moving patient to room near nurses station  Outcome: Progressing

## 2022-10-03 NOTE — PLAN OF CARE
Problem: PAIN - ADULT  Goal: Verbalizes/displays adequate comfort level or baseline comfort level  Description: Interventions:  - Encourage patient to monitor pain and request assistance  - Assess pain using appropriate pain scale  - Administer analgesics based on type and severity of pain and evaluate response  - Implement non-pharmacological measures as appropriate and evaluate response  - Consider cultural and social influences on pain and pain management  - Notify physician/advanced practitioner if interventions unsuccessful or patient reports new pain  Outcome: Progressing     Problem: INFECTION - ADULT  Goal: Absence or prevention of progression during hospitalization  Description: INTERVENTIONS:  - Assess and monitor for signs and symptoms of infection  - Monitor lab/diagnostic results  - Monitor all insertion sites, i e  indwelling lines, tubes, and drains  - Monitor endotracheal if appropriate and nasal secretions for changes in amount and color  - [place] appropriate cooling/warming therapies per order  - Administer medications as ordered  - Instruct and encourage patient and family to use good hand hygiene technique  - Identify and instruct in appropriate isolation precautions for identified infection/condition  Outcome: Progressing  Goal: Absence of fever/infection during neutropenic period  Description: INTERVENTIONS:  - Monitor WBC    Outcome: Progressing     Problem: SAFETY ADULT  Goal: Patient will remain free of falls  Description: INTERVENTIONS:  - Educate patient/family on patient safety including physical limitations  - Instruct patient to call for assistance with activity   - Consult OT/PT to assist with strengthening/mobility   - Keep Call bell within reach  - Keep bed low and locked with side rails adjusted as appropriate  - Keep care items and personal belongings within reach  - Initiate and maintain comfort rounds  - Make Fall Risk Sign visible to staff  - Apply yellow socks and bracelet for high fall risk patients  - Consider moving patient to room near nurses station  Outcome: Progressing  Goal: Maintain or return to baseline ADL function  Description: INTERVENTIONS:  -  Assess patient's ability to carry out ADLs; assess patient's baseline for ADL function and identify physical deficits which impact ability to perform ADLs (bathing, care of mouth/teeth, toileting, grooming, dressing, etc )  - Assess/evaluate cause of self-care deficits   - Assess range of motion  - Assess patient's mobility; develop plan if impaired  - Assess patient's need for assistive devices and provide as appropriate  - Encourage maximum independence but intervene and supervise when necessary  - Involve family in performance of ADLs  - Assess for home care needs following discharge   - Consider OT consult to assist with ADL evaluation and planning for discharge  - Provide patient education as appropriate  Outcome: Progressing  Goal: Maintains/Returns to pre admission functional level  Description: INTERVENTIONS:  - Perform BMAT or MOVE assessment daily    - Set and communicate daily mobility goal to care team and patient/family/caregiver  - Collaborate with rehabilitation services on mobility goals if consulted  - Perform Range of Motion 2 times a day  - Reposition patient every 2 hours    - Dangle patient 2 times a day  - Stand patient 2 times a day  - Ambulate patient 2 times a day  - Out of bed to chair 2 times a day   - Out of bed for meals 2 times a day  - Out of bed for toileting  - Record patient progress and toleration of activity level   Outcome: Progressing     Problem: DISCHARGE PLANNING  Goal: Discharge to home or other facility with appropriate resources  Description: INTERVENTIONS:  - Identify barriers to discharge w/patient and caregiver  - Arrange for needed discharge resources and transportation as appropriate  - Identify discharge learning needs (meds, wound care, etc )  - Arrange for interpretive services to assist at discharge as needed  - Refer to Case Management Department for coordinating discharge planning if the patient needs post-hospital services based on physician/advanced practitioner order or complex needs related to functional status, cognitive ability, or social support system  Outcome: Progressing     Problem: Knowledge Deficit  Goal: Patient/family/caregiver demonstrates understanding of disease process, treatment plan, medications, and discharge instructions  Description: Complete learning assessment and assess knowledge base    Interventions:  - Provide teaching at level of understanding  - Provide teaching via preferred learning methods  Outcome: Progressing

## 2022-10-03 NOTE — ASSESSMENT & PLAN NOTE
· Patient low-grade fever 100 4 on 10/01/2022  · Patient denies any signs of upper respiratory tract infection, GI or  symptoms  · Chest x-ray negative for acute pathology  · Thought to be secondary to atelectasis, started using incentive spirometry  · Afebrile in the past 24 hours

## 2022-10-03 NOTE — ASSESSMENT & PLAN NOTE
· Patient low-grade fever 100 4 on 10/01/2022  · Patient denies any signs of upper respiratory tract infection, GI or  symptoms  · Chest x-ray negative for acute pathology  · Thought to be secondary to atelectasis, started using incentive spirometry  · Afebrile in the past 48 hours

## 2022-10-03 NOTE — QUICK NOTE
He transferred to [facility] today  I held his Chlorthalidone and lisinopril for cath monday and placed orders

## 2022-10-03 NOTE — ASSESSMENT & PLAN NOTE
· Patient has a history of CABG x3 and stents last 1 placed in May 2022  · Patient presented with midsternal chest pressure  · Elevated troponin with no ischemic EKG changes  · Echo showed ejection fraction 60%, mildly abnormal diastolic function  · Nuclear stress test abnormal  · Evaluated by Cardiology, and Interventional Cardiology    Patient is too high risks for a CT M[place] and recommended transferred to [place]  · Continue heparin drip, aspirin, Plavix, statin, metoprolol, Isordil  · Telemetry  · Patient undergoing cardiac cath 10/3; follow up on results

## 2022-10-03 NOTE — H&P
[address] [facility]  H&P- Cayetano Grates 1950, 67 y o  male MRN: [id number]  Unit/Bed#: [facility] 726-01 Encounter: [id number]  Primary Care Provider: Sinai Oates DO   Date and time admitted to hospital: 10/2/2022  7:51 PM    * Chest pain  Assessment & Plan  · Patient has a history of CABG x3 and stents last 1 placed in May 2022  · Patient presented with midsternal chest pressure  · Elevated troponin with no ischemic EKG changes  · Echo showed ejection fraction 60%, mildly abnormal diastolic function  · Nuclear stress test abnormal  · Evaluated by Cardiology, and Interventional Cardiology    Patient is too high risks for a CT [place] and recommended transferred to [facility]  · Continue heparin drip, aspirin, Plavix, statin, metoprolol, Isordil  · Telemetry    History of coronary artery disease  Assessment & Plan  · History of TIMOTHY to OM2 in 2009, status post CABG x3 in 2017, status post TIMOTHY x2 to LM and circumflex at May 2022  · Continue aspirin, Plavix, statin, metoprolol, lisinopril, Isordil    Fever  Assessment & Plan  · Patient low-grade fever 100 4 on 10/01/2022  · Patient denies any signs of upper respiratory tract infection, GI or  symptoms  · Chest x-ray negative for acute pathology  · Thought to be secondary to atelectasis, started using incentive spirometry  · Afebrile in the past 24 hours    Sinus bradycardia  Assessment & Plan  · Due to bradycardia his metoprolol 100 mg b i d  was decreased to 50 mg b i d  with improvement of heart rate  · Continue telemetry    Type 2 diabetes mellitus [facility])  Assessment & Plan  Lab Results   Component Value Date    HGBA1C 6 8 (H) 09/29/2022       Recent Labs     10/01/22  1141 10/01/22  2039 10/02/22  0800 10/02/22  1143   POCGLU 189* 122 119 208*       Blood Sugar Average: Last 72 hrs:  · Home regimen Lantus 30 units at bedtime and Humalog 10 units t i d   · Continue Levemir 20 units HS, Humalog 7 units t i d  with meal  · Diabetic diet  · Hypoglycemia protocol    Hypothyroidism  Assessment & Plan  · Continue levothyroxine    Obesity  Assessment & Plan  · Lifestyle modification    Atrial fibrillation (HCC)  Assessment & Plan  · Diagnosed after bypass surgery  · Currently in normal sinus rhythm  · Continue metoprolol, anticoagulated with Eliquis, currently on heparin drip    Hyperlipidemia  Assessment & Plan  · Continue statin    Hypertension  Assessment & Plan  · Continue chlorthalidone, lisinopril, metoprolol    VTE Pharmacologic Prophylaxis:   Moderate Risk (Score 3-4) - Pharmacological DVT Prophylaxis Ordered: heparin drip  Code Status: Level 1 - Full Code   Discussion with family: Patient declined call to   Anticipated Length of Stay: Patient will be admitted on an inpatient basis with an anticipated length of stay of greater than 2 midnights secondary to NSTEMI  Total Time for Visit, including Counseling / Coordination of Care: 60 minutes Greater than 50% of this total time spent on direct patient counseling and coordination of care  Chief Complaint:  Chest    History of Present Illness:  Leticia Elkins is a 67 y o  male with a PMH of hypertension, hyperlipidemia, type 2 diabetes, coronary artery disease, hypothyroidism who presents with midsternal chest tightness  Patient has an extensive cardiac history with CABG x3 in 2017, PCI in May 2022  He was going outside to feed his birds and he developed midsternal chest tightness called his primary care doctor and he was sent to [facility] ED  Found to have elevated troponin, no acute ischemic changes on the EKG  He was evaluated by Cardiology in plan was cardiac catheterization [facility]    After reviewing by interventional cardiologist due to his anatomy he was sent to [facility] for cardiac catheterization  Currently patient is awake alert and oriented, denies headache, visual changes, chest pain palpitation shortness of breath, nausea vomiting abdominal pain constipation diarrhea or trouble with urination  Review of Systems:  Review of Systems all reviewed and negative except as above    Past Medical and Surgical History:   Past Medical History:   Diagnosis Date    [facility]     CAD (coronary artery disease)     Diabetes mellitus ([facility] )     IDDM    Heart attack ([facility] )     Heart disease     Hyperlipidemia     Hypertension     Myocardial infarction ([facility] ) 1989    Neuropathy     S/P triple vessel bypass     Sleep apnea     NO CPAP       Past Surgical History:   Procedure Laterality Date    BACK SURGERY  2010    benign tumor removed from spine    CORONARY ANGIOPLASTY  2009    SHOULDER SURGERY Right     TONSILLECTOMY         Meds/Allergies:  Prior to Admission medications    Medication Sig Start Date End Date Taking?  Authorizing Provider   apixaban (ELIQUIS) 5 mg apixaban 5 mg tablet    Historical Provider, MD   aspirin 325 mg tablet Take 325 mg by mouth daily at bedtime    Patient not taking: Reported on 9/28/2022    Historical Provider, MD   atorvastatin (LIPITOR) 80 mg tablet Take 80 mg by mouth daily at bedtime      Historical Provider, MD   calcium-vitamin D (OSCAL) 250-125 MG-UNIT per tablet Take 2 tablets by mouth 2 (two) times a day    Historical Provider, MD   chlorthalidone 25 mg tablet Take 25 mg by mouth daily in the early morning      Historical Provider, MD   cholecalciferol (VITAMIN D3) 1,000 units tablet Take 2,000 Units by mouth daily    Historical Provider, MD   clonazePAM (KlonoPIN) 0 5 mg tablet Take 0 5 mg by mouth 2 (two) times a day    Historical Provider, MD   cyanocobalamin 1000 MCG tablet Take 100 mcg by mouth every 30 (thirty) days    Historical Provider, MD   EMPAGLIFLOZIN-LINAGLIPTIN PO Take 5 mg by mouth    Historical Provider, MD   ezetimibe (ZETIA) 10 mg tablet Take 5 mg by mouth daily    Historical Provider, MD   gabapentin (NEURONTIN) 400 mg capsule Take 400 mg by mouth 3 (three) times a day    Patient not taking: Reported on 9/28/2022    Historical Provider, MD   insulin aspart (NovoLOG) 100 units/mL injection Inject 10 Units under the skin 3 (three) times a day with meals    Historical Provider, MD   insulin detemir (LEVEMIR) 100 units/mL subcutaneous injection Inject 70 Units under the skin daily at bedtime    Patient not taking: Reported on 9/29/2022    Historical Provider, MD   insulin glargine (Lantus) 100 units/mL subcutaneous injection Inject 38 Units under the skin daily at bedtime    Historical Provider, MD   insulin regular (HumuLIN R,NovoLIN R) 100 units/mL injection Inject 26 Units under the skin 3 (three) times a day with meals    Patient not taking: Reported on 9/28/2022    Historical Provider, MD   isosorbide dinitrate (ISORDIL) 20 mg tablet Take 20 mg by mouth 3 (three) times a day    Historical Provider, MD   Levothyroxine Sodium 88 MCG CAPS Take by mouth    Historical Provider, MD   lisinopril (ZESTRIL) 40 mg tablet Take 40 mg by mouth daily after breakfast      Historical Provider, MD   metFORMIN (GLUCOPHAGE) 1000 MG tablet Take 1,000 mg by mouth 2 (two) times a day with meals    Historical Provider, MD   metoprolol tartrate (LOPRESSOR) 100 mg tablet Take 100 mg by mouth every 12 (twelve) hours    Historical Provider, MD   pregabalin (LYRICA) 100 mg capsule Take 100 mg by mouth 3 (three) times a day    Historical Provider, MD     I have reviewed home medications using recent Epic encounter  Allergies: Allergies   Allergen Reactions    Amlodipine Lip Swelling       Social History:  Marital Status: /Civil Union   Substance Use History:   Social History     Substance and Sexual Activity   Alcohol Use Not Currently    Comment: social     Social History     Tobacco Use   Smoking Status Former Smoker   Smokeless Tobacco Never Used   Tobacco Comment    quit 1989     Social History     Substance and Sexual Activity   Drug Use No       Family History:  History reviewed   No pertinent family history  Physical Exam:     Vitals:   Blood Pressure: 137/64 (10/02/22 2137)  Pulse: 74 (10/02/22 2137)  Temperature: 99 4 °F (37 4 °C) (10/02/22 2019)  SpO2: 96 % (10/02/22 2137)    Physical Exam  Constitutional:       General: He is not in acute distress  Appearance: He is obese  HENT:      Head: Atraumatic  Cardiovascular:      Rate and Rhythm: Normal rate and regular rhythm  Heart sounds: No murmur heard  No friction rub  No gallop  Pulmonary:      Effort: Pulmonary effort is normal  No respiratory distress  Breath sounds: Normal breath sounds  No wheezing  Abdominal:      General: Bowel sounds are normal  There is no distension  Palpations: Abdomen is soft  Tenderness: There is no abdominal tenderness  Musculoskeletal:         General: No swelling  Cervical back: Neck supple  Skin:     General: Skin is warm and dry  Neurological:      General: No focal deficit present  Mental Status: He is alert  Psychiatric:         Mood and Affect: Mood normal         Additional Data:     Lab Results:  Results from last 7 days   Lab Units 10/02/22  0744   WBC Thousand/uL 7 30   HEMOGLOBIN g/dL 13 4   HEMATOCRIT % 41 0   PLATELETS Thousands/uL 170   NEUTROS PCT % 61   LYMPHS PCT % 23   MONOS PCT % 13*   EOS PCT % 1     Results from last 7 days   Lab Units 10/01/22  0617 09/29/22  0713 09/28/22  1328   SODIUM mmol/L 135   < > 137   POTASSIUM mmol/L 3 8   < > 3 7   CHLORIDE mmol/L 103   < > 104   CO2 mmol/L 23   < > 27   BUN mg/dL 21   < > 23   CREATININE mg/dL 1 15   < > 1 13   ANION GAP mmol/L 9   < > 6   CALCIUM mg/dL 8 9   < > 9 6   ALBUMIN g/dL  --   --  3 6   TOTAL BILIRUBIN mg/dL  --   --  0 93   ALK PHOS U/L  --   --  98   ALT U/L  --   --  17   AST U/L  --   --  16   GLUCOSE RANDOM mg/dL 112   < > 79    < > = values in this interval not displayed       Results from last 7 days   Lab Units 09/28/22  1328   INR  1 39*     Results from last 7 days   Lab Units 10/02/22  1143 10/02/22  0800 10/01/22  2039 10/01/22  1141 10/01/22  0854 09/30/22  2216 09/30/22  1715 09/29/22  2339 09/29/22  1837 09/29/22  1742 09/29/22  0506 09/29/22  0408   POC GLUCOSE mg/dl 208* 119 122 189* 200* 155* 180* 156* 160* 65 97 59*     Results from last 7 days   Lab Units 09/29/22  0038   HEMOGLOBIN A1C % 6 8*           Imaging: Reviewed radiology reports from this admission including: chest xray  No orders to display       EKG and Other Studies Reviewed on Admission:   · EKG: Sinus rhythm, first-degree AV block, occasional PVCs  ** Please Note: This note has been constructed using a voice recognition system   **

## 2022-10-04 VITALS
SYSTOLIC BLOOD PRESSURE: 130 MMHG | WEIGHT: 279.76 LBS | BODY MASS INDEX: 42.54 KG/M2 | OXYGEN SATURATION: 93 % | TEMPERATURE: 98.4 F | DIASTOLIC BLOOD PRESSURE: 71 MMHG | HEART RATE: 58 BPM

## 2022-10-04 PROBLEM — R07.9 CHEST PAIN: Status: RESOLVED | Noted: 2022-09-28 | Resolved: 2022-10-04

## 2022-10-04 LAB
ANION GAP SERPL CALCULATED.3IONS-SCNC: 8 MMOL/L (ref 4–13)
BUN SERPL-MCNC: 17 MG/DL (ref 5–25)
CALCIUM SERPL-MCNC: 9.5 MG/DL (ref 8.3–10.1)
CHLORIDE SERPL-SCNC: 104 MMOL/L (ref 96–108)
CO2 SERPL-SCNC: 25 MMOL/L (ref 21–32)
CREAT SERPL-MCNC: 1.05 MG/DL (ref 0.6–1.3)
ERYTHROCYTE [DISTWIDTH] IN BLOOD BY AUTOMATED COUNT: 18.1 % (ref 11.6–15.1)
GFR SERPL CREATININE-BSD FRML MDRD: 70 ML/MIN/1.73SQ M
GLUCOSE SERPL-MCNC: 121 MG/DL (ref 65–140)
GLUCOSE SERPL-MCNC: 146 MG/DL (ref 65–140)
GLUCOSE SERPL-MCNC: 165 MG/DL (ref 65–140)
HCT VFR BLD AUTO: 39 % (ref 36.5–49.3)
HGB BLD-MCNC: 12.8 G/DL (ref 12–17)
MCH RBC QN AUTO: 27.3 PG (ref 26.8–34.3)
MCHC RBC AUTO-ENTMCNC: 32.8 G/DL (ref 31.4–37.4)
MCV RBC AUTO: 83 FL (ref 82–98)
PLATELET # BLD AUTO: 174 THOUSANDS/UL (ref 149–390)
PMV BLD AUTO: 11 FL (ref 8.9–12.7)
POTASSIUM SERPL-SCNC: 3.8 MMOL/L (ref 3.5–5.3)
RBC # BLD AUTO: 4.69 MILLION/UL (ref 3.88–5.62)
SODIUM SERPL-SCNC: 137 MMOL/L (ref 135–147)
WBC # BLD AUTO: 7.96 THOUSAND/UL (ref 4.31–10.16)

## 2022-10-04 PROCEDURE — NC001 PR NO CHARGE: Performed by: INTERNAL MEDICINE

## 2022-10-04 PROCEDURE — 85027 COMPLETE CBC AUTOMATED: CPT

## 2022-10-04 PROCEDURE — 99239 HOSP IP/OBS DSCHRG MGMT >30: CPT | Performed by: GENERAL PRACTICE

## 2022-10-04 PROCEDURE — 80048 BASIC METABOLIC PNL TOTAL CA: CPT

## 2022-10-04 PROCEDURE — 82948 REAGENT STRIP/BLOOD GLUCOSE: CPT

## 2022-10-04 RX ORDER — CHLORTHALIDONE 25 MG/1
12.5 TABLET ORAL
Refills: 0
Start: 2022-10-04

## 2022-10-04 RX ORDER — RANOLAZINE 500 MG/1
500 TABLET, EXTENDED RELEASE ORAL EVERY 12 HOURS SCHEDULED
Qty: 30 TABLET | Refills: 0 | Status: SHIPPED | OUTPATIENT
Start: 2022-10-04

## 2022-10-04 RX ORDER — RANOLAZINE 500 MG/1
500 TABLET, EXTENDED RELEASE ORAL EVERY 12 HOURS SCHEDULED
Qty: 60 TABLET | Refills: 0 | Status: SHIPPED | OUTPATIENT
Start: 2022-10-04 | End: 2022-10-04 | Stop reason: SDUPTHER

## 2022-10-04 RX ORDER — METOPROLOL TARTRATE 50 MG/1
50 TABLET, FILM COATED ORAL EVERY 12 HOURS SCHEDULED
Qty: 30 TABLET | Refills: 0 | Status: SHIPPED | OUTPATIENT
Start: 2022-10-04

## 2022-10-04 RX ORDER — CLOPIDOGREL BISULFATE 75 MG/1
75 TABLET ORAL DAILY
COMMUNITY

## 2022-10-04 RX ORDER — METOPROLOL TARTRATE 50 MG/1
50 TABLET, FILM COATED ORAL EVERY 12 HOURS SCHEDULED
Qty: 60 TABLET | Refills: 0 | Status: SHIPPED | OUTPATIENT
Start: 2022-10-04 | End: 2022-10-04 | Stop reason: SDUPTHER

## 2022-10-04 RX ORDER — POTASSIUM CHLORIDE 750 MG/1
10 TABLET, EXTENDED RELEASE ORAL DAILY
Qty: 15 TABLET | Refills: 0 | Status: SHIPPED | OUTPATIENT
Start: 2022-10-05

## 2022-10-04 RX ORDER — POTASSIUM CHLORIDE 750 MG/1
10 TABLET, EXTENDED RELEASE ORAL DAILY
Qty: 30 TABLET | Refills: 0 | Status: SHIPPED | OUTPATIENT
Start: 2022-10-05 | End: 2022-10-04 | Stop reason: SDUPTHER

## 2022-10-04 RX ORDER — POTASSIUM CHLORIDE 750 MG/1
10 TABLET, EXTENDED RELEASE ORAL DAILY
Status: DISCONTINUED | OUTPATIENT
Start: 2022-10-04 | End: 2022-10-04 | Stop reason: HOSPADM

## 2022-10-04 RX ADMIN — METOPROLOL TARTRATE 50 MG: 50 TABLET, FILM COATED ORAL at 08:05

## 2022-10-04 RX ADMIN — INSULIN HUMAN 7 UNITS: 100 INJECTION, SOLUTION PARENTERAL at 12:00

## 2022-10-04 RX ADMIN — VITAM B12 100 MCG: 100 TAB at 08:05

## 2022-10-04 RX ADMIN — ISOSORBIDE DINITRATE 20 MG: 20 TABLET ORAL at 09:33

## 2022-10-04 RX ADMIN — EZETIMIBE 5 MG: 10 TABLET ORAL at 08:04

## 2022-10-04 RX ADMIN — ASPIRIN 81 MG: 81 TABLET, COATED ORAL at 08:05

## 2022-10-04 RX ADMIN — CLOPIDOGREL BISULFATE 75 MG: 75 TABLET ORAL at 08:05

## 2022-10-04 RX ADMIN — Medication 2 TABLET: at 08:04

## 2022-10-04 RX ADMIN — CLONAZEPAM 0.5 MG: 0.5 TABLET ORAL at 08:04

## 2022-10-04 RX ADMIN — LEVOTHYROXINE SODIUM 88 MCG: 88 TABLET ORAL at 05:53

## 2022-10-04 RX ADMIN — POTASSIUM CHLORIDE 10 MEQ: 750 TABLET, EXTENDED RELEASE ORAL at 11:55

## 2022-10-04 RX ADMIN — RANOLAZINE 500 MG: 500 TABLET, FILM COATED, EXTENDED RELEASE ORAL at 08:06

## 2022-10-04 RX ADMIN — PREGABALIN 100 MG: 100 CAPSULE ORAL at 08:05

## 2022-10-04 RX ADMIN — INSULIN HUMAN 7 UNITS: 100 INJECTION, SOLUTION PARENTERAL at 08:09

## 2022-10-04 RX ADMIN — Medication 2000 UNITS: at 08:04

## 2022-10-04 NOTE — RESTORATIVE TECHNICIAN NOTE
Restorative Technician Note      Patient Name: Lyndon James     Note Type: Mobility  Patient Position Upon Consult: Supine  Activity Performed: Ambulated; Dangled; Stood  Assistive Device: Other (Comment) (none)  Education Provided: Yes  Patient Position at End of Consult: Supine;  All needs within reach; Bed/Chair alarm activated    Fam CUELLO, Restorative Technician, [employer]

## 2022-10-04 NOTE — RESTORATIVE TECHNICIAN NOTE
Restorative Technician Note      Patient Name: Kevin Witt     Note Type: Mobility  Patient Position Upon Consult: Seated edge of bed  Activity Performed: Ambulated; Dangled; Stood  Assistive Device: Other (Comment) (none)  Education Provided: Yes  Patient Position at End of Consult: Seated edge of bed;  All needs within reach    [facility] GILA CUELLO, Restorative Technician, [employer]

## 2022-10-04 NOTE — PLAN OF CARE
Problem: PAIN - ADULT  Goal: Verbalizes/displays adequate comfort level or baseline comfort level  Description: Interventions:  - Encourage patient to monitor pain and request assistance  - Assess pain using appropriate pain scale  - Administer analgesics based on type and severity of pain and evaluate response  - Implement non-pharmacological measures as appropriate and evaluate response  - Consider cultural and social influences on pain and pain management  - Notify physician/advanced practitioner if interventions unsuccessful or patient reports new pain  Outcome: Progressing     Problem: SAFETY ADULT  Goal: Patient will remain free of falls  Description: INTERVENTIONS:  - Educate patient/family on patient safety including physical limitations  - Instruct patient to call for assistance with activity   - Consult OT/PT to assist with strengthening/mobility   - Keep Call bell within reach  - Keep bed low and locked with side rails adjusted as appropriate  - Keep care items and personal belongings within reach  - Initiate and maintain comfort rounds  - Apply yellow socks and bracelet for high fall risk patients  - Consider moving patient to room near nurses station  Outcome: Progressing

## 2022-10-04 NOTE — PROGRESS NOTES
Cardiology Team Progress Note - Zoe Hoffman 67 y o  male MRN: [id number]    Unit/Bed#: [facility] 726-01 Encounter: [id number]    Current Problem List   Principal Problem: Chest Pain        Active Problems:  MI 1989, drug eluting stents in LCX and left main in 2009,  triple vessel CABG in 2017, HTN, HLD,  insulin dependent T2DM and hypothyroidism      Assessment/Plan:     Zoe Hoffman is a 67y o  year old male with a history of MI 1989, drug eluting stents in LCX and left main in 2009,  triple vessel CABG in 2017, HTN, HLD,  insulin dependent T2DM and hypothyroidism who originally presented to [facility] with chest pain Patient was transferred here for cath lab due to his extensive cardiac history  In cath there was no evidence of obstructive diease  Patient therefore is using medical management for symptoms  Assessment and Plan:   1  Chest pain likely secondary to an ischemic event  ? 09/29 Echo: Left ventricular ejection fraction is 60%  Systolic function is normal  Diastolic function is mildly abnormal, consistent with grade I (abnormal) relaxation  ? 09/29 Stress test:    ? Perfusion: There is a left ventricular perfusion defect that is overall large in size and moderate in severity involving the basal to mid anterolateral and inferolateral walls  There is also mild reversibility involving the anterior wall and apex  ? Stress Function: Left ventricular function post-stress is normal  Post-stress ejection fraction is 60 %  ? Perfusion Defect Conclusion: The stress/rest perfusion ratio is 1 14   There is no quantitative evidence of transient ischemic dilation (TID) but visually appreciated  ? 10/03: Cath lab showed recent stents from LMCA to proximal LCX are occluded in proximal LCX  With no significant flow in the mid LCX and none in OM2  Plan:   ? ASA 81 mg and Plavix 75 mg Bid while inpatient d/c on home Plavix and Eliquis  ? Continue Metoprolol 50 mg BID   ? Add Ranolazine 500 mg BID   ?  C/w home Isorbil 20 mg Tid   ? C/w home Lisinopril   ? C/w home Lipitor 80 mg   2  Afib  ? Presented after bypass 5 years ago  Patient on Plavix and Eliquis outpatient  ? C/w Metoprolol 50 mg BID   ? ASA 81 mg and Plavix 75 mg while inpatient d/c on home Plavix and Eliquis  3  HTN  ? C/w home Lisinopril and Chorthalidone   ? Continue Metoprolol 50 mg BID   4  HLD   ? Lipitor 80 mg     **Please see Dr Jordan Hicks note above for final plan**     Subjective:   Patient seen and examined at bedside  No acute event overnight  Patient denies any chest pain, SOB, palpations or lightheadedness  Overall patient states that he is doing well  He has no complaints  No concerns per nursing staff  Vitals: Blood pressure 130/71, pulse 58, temperature 98 4 °F (36 9 °C), weight 127 kg (279 lb 12 2 oz), SpO2 93 %  , Body mass index is 42 54 kg/m² ,   Orthostatic Blood Pressures      Flowsheet Row Most Recent Value   Blood Pressure 130/71 filed at 10/04/2022 3813              Intake/Output Summary (Last 24 hours) at 10/4/2022 9500  Last data filed at 10/3/2022 1143  Gross per 24 hour   Intake --   Output 5 ml   Net -5 ml       Physical Exam:    GEN: Daniel Gee appears well, alert and oriented x 3, pleasant and cooperative   HEENT:  Normocephalic, atraumatic, anicteric, moist mucous membranes  NECK: No JVD or carotid bruits   HEART: regular rhythm, regular rate, normal S1 and S2, no murmurs, clicks, gallops or rubs   LUNGS: Clear to auscultation bilaterally; no wheezes, rales, or rhonchi; respiration nonlabored   ABDOMEN:  Normoactive bowel sounds, soft, no tenderness, no distention  EXTREMITIES: peripheral pulses palpable; LE edema  NEURO: no gross focal findings; cranial nerves grossly intact   SKIN:  Dry, intact, warm to touch      Current Facility-Administered Medications:     acetaminophen (TYLENOL) tablet 650 mg, 650 mg, Oral, Q6H PRN, Zofia Cortez MD    aspirin (ECOTRIN LOW STRENGTH) EC tablet 81 mg, 81 mg, Oral, Daily, Izabella Blackmon MD, 81 mg at 10/04/22 0805    atorvastatin (LIPITOR) tablet 80 mg, 80 mg, Oral, HS, Izabella Blackmon MD, 80 mg at 10/03/22 2139    calcium carbonate-vitamin D (OSCAL-D) 500 mg-200 units per tablet 2 tablet, 2 tablet, Oral, BID, Izabella Blackmon MD, 2 tablet at 10/04/22 0804    cholecalciferol (VITAMIN D3) tablet 2,000 Units, 2,000 Units, Oral, Daily, Izabella Blackmon MD, 2,000 Units at 10/04/22 0804    clonazePAM (KlonoPIN) tablet 0 5 mg, 0 5 mg, Oral, BID, Izabella Blackmon MD, 0 5 mg at 10/04/22 0804    clopidogrel (PLAVIX) tablet 75 mg, 75 mg, Oral, Daily, Izabella Blackmon MD, 75 mg at 10/04/22 0805    cyanocobalamin (VITAMIN B-12) tablet 100 mcg, 100 mcg, Oral, Daily, Izabella Blackmon MD, 100 mcg at 10/04/22 0805    ezetimibe (ZETIA) tablet 5 mg, 5 mg, Oral, Daily, Izabella Blackmon MD, 5 mg at 10/04/22 0804    insulin detemir (LEVEMIR) subcutaneous injection 20 Units, 20 Units, Subcutaneous, HS, Izabella Blackmon MD, 20 Units at 10/03/22 2139    insulin regular (HumuLIN R,NovoLIN R) injection 7 Units, 7 Units, Subcutaneous, TID With Meals, Izabella Blackmon MD, 7 Units at 10/03/22 1653    isosorbide dinitrate (ISORDIL) tablet 20 mg, 20 mg, Oral, TID, Izabella Blackmon MD, 20 mg at 10/03/22 2139    levothyroxine tablet 88 mcg, 88 mcg, Oral, Early Morning, Elizabeth Mathews MD, 88 mcg at 10/04/22 0553    metoprolol tartrate (LOPRESSOR) tablet 50 mg, 50 mg, Oral, Q12H [facility], Izabella Blackmon MD, 50 mg at 10/04/22 0805    morphine injection 2 mg, 2 mg, Intravenous, Q4H PRN, Izabella Blackmon MD    nitroglycerin (NITROSTAT) SL tablet 0 4 mg, 0 4 mg, Sublingual, Q5 Min PRN, Izabella Blackmon MD    ondansetron (ZOFRAN) injection 4 mg, 4 mg, Intravenous, Q6H PRN, Vista Persons, CRNP    pregabalin (LYRICA) capsule 100 mg, 100 mg, Oral, TID, Izabella Blackmon MD, 100 mg at 10/04/22 0805    ranolazine (RANEXA) 12 hr tablet 500 mg, 500 mg, Oral, Q12H [facility], [place] Vamsi Davis MD, 500 mg at 10/04/22 7765    Labs & Results:          Results from last 7 days   Lab Units 10/04/22  0533 10/03/22  1348 10/03/22  0434   POTASSIUM mmol/L 3 8 3 9 3 5   CO2 mmol/L 25 24 25   CHLORIDE mmol/L 104 104 103   BUN mg/dL 17 18 18   CREATININE mg/dL 1 05 1 22 1 22     Results from last 7 days   Lab Units 10/04/22  0533 10/03/22  0434 10/02/22  0744   HEMOGLOBIN g/dL 12 8 12 8 13 4   HEMATOCRIT % 39 0 40 2 41 0   PLATELETS Thousands/uL 174 183 170     Results from last 7 days   Lab Units 09/29/22  0713 09/29/22  0038   TRIGLYCERIDES mg/dL 114  --    HDL mg/dL 25*  --    LDL CALC mg/dL 27  --    HEMOGLOBIN A1C %  --  6 8*       Telemetry:   Personally reviewed by [address]: no event overnight       VTE Prophylaxis: Heparin        Case discussed and reviewed with Dr Josue Arroyo and is pending their agreement with the assessment and plan  Thank you for involving us in the care of your patient  [address]  PGY-1  [address]      ** Please Note: Fluency DirectDictation voice to text software may have been used in the creation of this document   **

## 2022-10-05 ENCOUNTER — PATIENT OUTREACH (OUTPATIENT)
Dept: CASE MANAGEMENT | Facility: OTHER | Age: 72
End: 2022-10-05

## 2022-10-05 NOTE — DISCHARGE SUMMARY
[address] [facility]  Discharge- Emily Ruiz 1950, 67 y o  male MRN: [id number]  Unit/Bed#: [facility] 726-01 Encounter: [id number]  Primary Care Provider: Boston Delgado DO   Date and time admitted to hospital: 10/2/2022  7:51 PM    * Chest pain-resolved as of 10/4/2022  Assessment & Plan  · Patient has a history of CABG x3 and stents last 1 placed in May 2022  · Patient presented with midsternal chest pressure  · Elevated troponin with no ischemic EKG changes  · Echo showed ejection fraction 60%, mildly abnormal diastolic function  · Nuclear stress test abnormal  · Evaluated by Cardiology, and Interventional Cardiology    Patient was too high risk for a cath at [facility] and recommended transferred to [facility]  · Completed heparin drip  ·  aspirin while IP - sgtop at d/c to avoid triple therapy  · Plavix, statin, metoprolol, Isordil  · Telemetry  · Cath showed CAD not amenable to intervention  · Ranexa added     Fever  Assessment & Plan  · Patient low-grade fever 100 4 on 10/01/2022  · Patient denies any signs of upper respiratory tract infection, GI or  symptoms  · Chest x-ray negative for acute pathology  · Thought to be secondary to atelectasis, started using incentive spirometry  · Afebrile in the past 48 hours    Sinus bradycardia  Assessment & Plan  · Due to bradycardia his metoprolol 100 mg b i d  was decreased to 50 mg b i d  with improvement of heart rate    History of coronary artery disease  Assessment & Plan  · History of TIMOTHY to OM2 in 2009, status post CABG x3 in 2017, status post TIMOTHY x2 to LM and circumflex at May 2022  · Continue Plavix, Eliquis, statin, metoprolol, lisinopril, Isordil    Type 2 diabetes mellitus (HCC)  Assessment & Plan  Lab Results   Component Value Date    HGBA1C 6 8 (H) 09/29/2022       Recent Labs     10/03/22  0632 10/03/22  1258 10/04/22  0809 10/04/22  1159   POCGLU 116 151* 146* 165*       Blood Sugar Average: Last 72 hrs:  · Home regimen Lantus 30 units at bedtime and Humalog 10 units t i d   · (P) 141  6Levemir 20 units HS, Humalog 7 units t i d  with meal while IP  · D/c on home regimen as BSs well controlled on that    Hypothyroidism  Assessment & Plan  · Continue levothyroxine    Morbid obesity ([facility] [address] )  Assessment & Plan  · Lifestyle modification    Atrial fibrillation ([facility] [address] )  Assessment & Plan  · Diagnosed after bypass surgery  · Currently in normal sinus rhythm  · Continue metoprolol, anticoagulated with Eliquis    Hyperlipidemia  Assessment & Plan  · Continue statin    Hypertension  Assessment & Plan  · Continue chlorthalidone, lisinopril, metoprolol      Medical Problems             Resolved Problems  Date Reviewed: 10/4/2022          Resolved    * (Principal) Chest pain 10/4/2022     Resolved by  Daniela Soto DO              Discharging Physician / Practitioner: Daniela Soto  PCP: Gwen Diez DO  Admission Date:   Admission Orders (From admission, onward)     Ordered        10/02/22 1956  Inpatient Admission  Once                      Discharge Date: 10/04/22    Consultations During Hospital Stay:  · Cardio    Procedures Performed:   · Cath 10/3    Significant Findings / Test Results:   · See above    Incidental Findings:   · none     Test Results Pending at Discharge (will require follow up):   · none     Outpatient Tests Requested:  · PCP should check BMP in 1-2 weeks    Complications:  none    Reason for Admission: CP needing cath    Hospital Course:   Arturo Josue is a 67 y o  male patient who originally presented to the hospital on 10/2/2022 due to CP needing cath  Cath showed CAD not amenable to PCI  Ranexa added and today pt denied CP  Pt given 15 days of new meds and will get further meds from the [place]  Please see above list of diagnoses and related plan for additional information       Condition at Discharge: stable    Discharge Day Visit / Exam:   Subjective:  No acute complaints  Vitals: Blood Pressure: 130/71 (10/04/22 2750)  Pulse: 58 (10/03/22 2053)  Temperature: 98 4 °F (36 9 °C) (10/04/22 0714)  Weight - Scale: 127 kg (279 lb 12 2 oz) (10/04/22 0554)  SpO2: 93 % (10/03/22 2053)  Exam:   Physical Exam  HENT:      Head: Normocephalic and atraumatic  Nose: Nose normal       Mouth/Throat:      Mouth: Mucous membranes are moist    Eyes:      Extraocular Movements: Extraocular movements intact  Conjunctiva/sclera: Conjunctivae normal    Cardiovascular:      Rate and Rhythm: Normal rate and regular rhythm  Pulmonary:      Effort: Pulmonary effort is normal       Breath sounds: Normal breath sounds  Abdominal:      General: Bowel sounds are normal       Palpations: Abdomen is soft  Musculoskeletal:         General: Normal range of motion  Cervical back: Normal range of motion and neck supple  Skin:     General: Skin is warm and dry  Neurological:      Mental Status: He is alert and oriented to person, place, and time  Discussion with Family: Attempted to update  (wife) via phone  Left voicemail  Discharge instructions/Information to patient and family:   See after visit summary for information provided to patient and family  Provisions for Follow-Up Care:  See after visit summary for information related to follow-up care and any pertinent home health orders  Disposition:   Home    Planned Readmission: no     Discharge Statement:  I spent 35 minutes discharging the patient  This time was spent on the day of discharge  I had direct contact with the patient on the day of discharge  Greater than 50% of the total time was spent examining patient, answering all patient questions, arranging and discussing plan of care with patient as well as directly providing post-discharge instructions  Additional time then spent on discharge activities  Discharge Medications:  See after visit summary for reconciled discharge medications provided to patient and/or family        **Please Note: This note may have been constructed using a voice recognition system**

## 2022-10-05 NOTE — ASSESSMENT & PLAN NOTE
Lab Results   Component Value Date    HGBA1C 6 8 (H) 09/29/2022       Recent Labs     10/03/22  0632 10/03/22  1258 10/04/22  0809 10/04/22  1159   POCGLU 116 151* 146* 165*       Blood Sugar Average: Last 72 hrs:  · Home regimen Lantus 30 units at bedtime and Humalog 10 units t i d   · (P) 141  6Levemir 20 units HS, Humalog 7 units t i d  with meal while IP  · D/c on home regimen as BSs well controlled on that

## 2022-10-05 NOTE — PROGRESS NOTES
In basket message received with hospital discharge  Chart reviewed  Patient was admitted to [facility] on 9/28 with chest pain and history of severe CAD  Also has a past medical history of cardiac catheterization with stents  He also has HTN, HLD, Diabetes and hypothyroidism  He was deemed to high risk for a cardiac cath at this campus and was transferred to [facility]  He had a left cardiac cath done with no inventions due to risk  Medical management and lifestyle modifications were recommended  I spoke with patient who reports he has not had any chest pain since discharge  He is taking the medications as directed on his instructions he reports  He is waiting for his PCP and his cardiologist to return his calls to schedule an office visit  He sees a cardiologist at the [facility]  Patient reports he has no questions  He reports he has no needs at this time  I advised him to call 911 if the chest pain returns  He understands and agrees  He took my contact information and will call me if he has questions or concerns  He did consent to another call

## 2022-10-05 NOTE — ASSESSMENT & PLAN NOTE
· Patient has a history of CABG x3 and stents last 1 placed in May 2022  · Patient presented with midsternal chest pressure  · Elevated troponin with no ischemic EKG changes  · Echo showed ejection fraction 60%, mildly abnormal diastolic function  · Nuclear stress test abnormal  · Evaluated by Cardiology, and Interventional Cardiology    Patient was too high risk for a cath at [facility] and recommended transferred to [facility]  · Completed heparin drip  ·  aspirin while IP - sgtop at d/c to avoid triple therapy  · Plavix, statin, metoprolol, Isordil  · Telemetry  · Cath showed CAD not amenable to intervention  · Ranexa added

## 2022-10-05 NOTE — ASSESSMENT & PLAN NOTE
· History of TIMOTHY to OM2 in 2009, status post CABG x3 in 2017, status post TIMOTHY x2 to LM and circumflex at May 2022  · Continue Plavix, Eliquis, statin, metoprolol, lisinopril, Isordil

## 2022-10-05 NOTE — ASSESSMENT & PLAN NOTE
· Diagnosed after bypass surgery  · Currently in normal sinus rhythm  · Continue metoprolol, anticoagulated with Eliquis

## 2022-10-05 NOTE — ASSESSMENT & PLAN NOTE
· Due to bradycardia his metoprolol 100 mg b i d  was decreased to 50 mg b i d  with improvement of heart rate

## 2022-10-12 ENCOUNTER — PATIENT OUTREACH (OUTPATIENT)
Dept: CASE MANAGEMENT | Facility: OTHER | Age: 72
End: 2022-10-12

## 2022-10-12 NOTE — PROGRESS NOTES
I spoke with patient today who reports he saw his cardiologist this morning at the [facility]     Patient stated he fees well  He is taking his medications as directed  He has no needs at this time  He has my contact information and I advised him to call me with questions/concerns  He did consent to another call

## 2022-10-18 NOTE — PROGRESS NOTES
Cardiology Follow Up    Evgeny Erickson  1950  [id number]  [address] [address] [phone]    1  Coronary artery disease involving native heart, unspecified vessel or lesion type, unspecified whether angina present     2  Primary hypertension     3  Dyslipidemia     4  Type 2 diabetes mellitus with other specified complication, with long-term current use of insulin [facility])         Interval History:   Mr Mulugeta Rabago was admitted to [address] [place] [address] on 9/28 - 10/02/22 with chest pain  Troponin elevated  9/29/22 Nuclear stress test showed  Large area of reversibility ischemia along the transient ischemic dilatation which is visually suggesting presence of multivessel CAD   9/29/22 TTE: LVEF 60%, grade 1 Abnormal relaxation  No sig valve disease  He was transferred to [facility] for high risk LHC  Mr Mulugeta Rabago was admitted to [facility] on 10/02 - 10/04/22 with chest pain  10/03/22 LHC showed   Ostial circ to proximal circ 99% stenosis, mid circ to distal circ 99% stenosis, proximal LAD 95% stenosis, proximal % stenosis, 2nd marginal lesion 100% stenosis  Severe diffuse diabetic coronary artery disease  Grafts remain patent  Stent recently implanted in the left main circumflex has become severely re stenotic with subtotal InStent re stenoses throughout the length of the circumflex trunk  OM 2 stent is occluded  Distal circumflex branches are supplied by patent grafts  Plan aggressive medical therapy and lifestyle modification  Mr Mulugeta Rabago presents to our office for a recent hospitalization follow up visit  Viviana Bonilla admits to chest pain with walking, riding his bike and going up stairs 5-8/10, relieved with SL NTG  CP does not occur daily  He admits to most of his CP occurring with walking up stairs to go to bed, 12-13 stairs    He is on 100% constanza King follows with the [place] cardiologist and will follow up with the [place]        Medical History   Primary Cardiologist Dr Lucretia Cushing in 1989 2017 CBAG x 3  2009 sp TIMOTHY to [address]  3/2022 sp TIMOTHY x 2 to LM and Cx  DM2 Hgb A1C 6 8 on 9/29/22  Obesity BMI  Hyperlipidemia 9/29/22 TC 75, , HDL 25, LDL 27  Hypertension  Hypothyroidism     Allergic to Amlodipine     Patient Active Problem List   Diagnosis   • Supraspinatus tendon tear, right, initial encounter   • Infraspinatus tendon tear, right, initial encounter   • Hypertension   • Heart attack ([facility] [address] )   • S/P triple vessel bypass   • Hyperlipidemia   • Atrial fibrillation ([facility] [address] )   • Morbid obesity ([facility] [address] )   • Hypothyroidism   • Type 2 diabetes mellitus ([facility] [address] )   • History of coronary artery disease   • Sinus bradycardia   • Fever     Past Medical History:   Diagnosis Date   • Atrial fibrillation (HCC)    • CAD (coronary artery disease)    • Diabetes mellitus (HCC)     IDDM   • Heart attack ([facility] [address] )    • Heart disease    • Hyperlipidemia    • Hypertension    • Myocardial infarction ([facility] [address] ) 1989   • Neuropathy    • S/P triple vessel bypass    • Sleep apnea     NO CPAP     Social History     Socioeconomic History   • Marital status: /Civil Union     Spouse name: Not on file   • Number of children: Not on file   • Years of education: Not on file   • Highest education level: Not on file   Occupational History   • Not on file   Tobacco Use   • Smoking status: Former Smoker   • Smokeless tobacco: Never Used   • Tobacco comment: quit 1989   Vaping Use   • Vaping Use: Never used   Substance and Sexual Activity   • Alcohol use: Not Currently     Comment: social   • Drug use: No   • Sexual activity: Not on file   Other Topics Concern   • Not on file   Social History Narrative   • Not on file     Social Determinants of Health     Financial Resource Strain: Not on file   Food Insecurity: Not on file   Transportation Needs: Not on file   Physical Activity: Not on file Stress: Not on file   Social Connections: Not on file   Intimate Partner Violence: Not on file   Housing Stability: Not on file      No family history on file  Past Surgical History:   Procedure Laterality Date   • BACK SURGERY  2010    benign tumor removed from spine   • CARDIAC CATHETERIZATION Left 10/3/2022    Procedure: Cardiac Left Heart Cath;  Surgeon: Gavino Salazar MD;  Location: [facility];   Service: Cardiology   • CORONARY ANGIOPLASTY  2009   • SHOULDER SURGERY Right    • TONSILLECTOMY         Current Outpatient Medications:   •  apixaban (ELIQUIS) 5 mg, apixaban 5 mg tablet, Disp: , Rfl:   •  atorvastatin (LIPITOR) 80 mg tablet, Take 80 mg by mouth daily at bedtime  , Disp: , Rfl:   •  calcium-vitamin D (OSCAL) 250-125 MG-UNIT per tablet, Take 2 tablets by mouth 2 (two) times a day, Disp: , Rfl:   •  chlorthalidone 25 mg tablet, Take 0 5 tablets (12 5 mg total) by mouth daily in the early morning, Disp: , Rfl: 0  •  cholecalciferol (VITAMIN D3) 1,000 units tablet, Take 2,000 Units by mouth daily, Disp: , Rfl:   •  clonazePAM (KlonoPIN) 0 5 mg tablet, Take 0 5 mg by mouth 2 (two) times a day, Disp: , Rfl:   •  clopidogrel (PLAVIX) 75 mg tablet, Take 75 mg by mouth daily, Disp: , Rfl:   •  cyanocobalamin 1000 MCG tablet, Take 100 mcg by mouth every 30 (thirty) days, Disp: , Rfl:   •  EMPAGLIFLOZIN-LINAGLIPTIN PO, Take 5 mg by mouth, Disp: , Rfl:   •  ezetimibe (ZETIA) 10 mg tablet, Take 5 mg by mouth daily, Disp: , Rfl:   •  insulin glargine (Lantus) 100 units/mL subcutaneous injection, Inject 38 Units under the skin daily at bedtime, Disp: , Rfl:   •  isosorbide dinitrate (ISORDIL) 20 mg tablet, Take 20 mg by mouth 3 (three) times a day, Disp: , Rfl:   •  Levothyroxine Sodium 88 MCG CAPS, Take by mouth, Disp: , Rfl:   •  lisinopril (ZESTRIL) 40 mg tablet, Take 40 mg by mouth daily after breakfast  , Disp: , Rfl:   •  metFORMIN (GLUCOPHAGE) 1000 MG tablet, Take 1,000 mg by mouth 2 (two) times a day with meals, Disp: , Rfl:   •  metoprolol tartrate (LOPRESSOR) 50 mg tablet, Take 1 tablet (50 mg total) by mouth every 12 (twelve) hours, Disp: 30 tablet, Rfl: 0  •  potassium chloride (K-DUR,KLOR-CON) 10 mEq tablet, Take 1 tablet (10 mEq total) by mouth daily Do not start before October 5, 2022 , Disp: 15 tablet, Rfl: 0  •  pregabalin (LYRICA) 100 mg capsule, Take 100 mg by mouth 3 (three) times a day, Disp: , Rfl:   •  ranolazine (RANEXA) 500 mg 12 hr tablet, Take 1 tablet (500 mg total) by mouth every 12 (twelve) hours, Disp: 30 tablet, Rfl: 0  Allergies   Allergen Reactions   • Amlodipine Lip Swelling       Labs:  Admission on 10/02/2022, Discharged on 10/04/2022   Component Date Value   • POC Glucose 10/02/2022 130    • PTT 10/03/2022 54 (A)   • Sodium 10/03/2022 136    • Potassium 10/03/2022 3 5    • Chloride 10/03/2022 103    • CO2 10/03/2022 25    • ANION GAP 10/03/2022 8    • BUN 10/03/2022 18    • Creatinine 10/03/2022 1 22    • Glucose 10/03/2022 107    • Calcium 10/03/2022 9 4    • eGFR 10/03/2022 58    • WBC 10/03/2022 7 20    • RBC 10/03/2022 4 84    • Hemoglobin 10/03/2022 12 8    • Hematocrit 10/03/2022 40 2    • MCV 10/03/2022 83    • MCH 10/03/2022 26 4 (A)   • MCHC 10/03/2022 31 8    • RDW 10/03/2022 17 7 (A)   • MPV 10/03/2022 11 3    • Platelets 80/23/0139 183    • nRBC 10/03/2022 0    • Neutrophils Relative 10/03/2022 46    • Immat GRANS % 10/03/2022 1    • Lymphocytes Relative 10/03/2022 33    • Monocytes Relative 10/03/2022 17 (A)   • Eosinophils Relative 10/03/2022 2    • Basophils Relative 10/03/2022 1    • Neutrophils Absolute 10/03/2022 3 40    • Immature Grans Absolute 10/03/2022 0 07    • Lymphocytes Absolute 10/03/2022 2 39    • Monocytes Absolute 10/03/2022 1 19    • Eosinophils Absolute 10/03/2022 0 11    • Basophils Absolute 10/03/2022 0 04    • POC Glucose 10/03/2022 116    • Sodium 10/03/2022 135    • Potassium 10/03/2022 3 9    • Chloride 10/03/2022 104    • CO2 10/03/2022 24 • ANION GAP 10/03/2022 7    • BUN 10/03/2022 18    • Creatinine 10/03/2022 1 22    • Glucose 10/03/2022 147 (A)   • Calcium 10/03/2022 9 1    • eGFR 10/03/2022 58    • POC Glucose 10/03/2022 151 (A)   • WBC 10/04/2022 7 96    • RBC 10/04/2022 4 69    • Hemoglobin 10/04/2022 12 8    • Hematocrit 10/04/2022 39 0    • MCV 10/04/2022 83    • MCH 10/04/2022 27 3    • MCHC 10/04/2022 32 8    • RDW 10/04/2022 18 1 (A)   • Platelets 45/14/6070 174    • MPV 10/04/2022 11 0    • Sodium 10/04/2022 137    • Potassium 10/04/2022 3 8    • Chloride 10/04/2022 104    • CO2 10/04/2022 25    • ANION GAP 10/04/2022 8    • BUN 10/04/2022 17    • Creatinine 10/04/2022 1 05    • Glucose 10/04/2022 121    • Calcium 10/04/2022 9 5    • eGFR 10/04/2022 70    • POC Glucose 10/04/2022 146 (A)   • POC Glucose 10/04/2022 165 (A)   No results displayed because visit has over 200 results  Imaging: XR chest 1 view portable    Result Date: 9/29/2022  Narrative: CHEST INDICATION:   chest pain  COMPARISON:  Chest x-ray 1/3/2011 EXAM PERFORMED/VIEWS:  XR CHEST PORTABLE FINDINGS:  There has been prior sternotomy  Cardiomediastinal silhouette appears unremarkable  The lungs are clear  No pneumothorax or pleural effusion  Degenerative spurring at the right shoulder  Impression: No active pulmonary disease  Workstation performed: [id number]     Cardiac catheterization    Result Date: 10/3/2022  Narrative: · Ost Cx to Prox Cx lesion is 99% stenosed  · Mid Cx to Dist Cx lesion is 99% stenosed  · Prox LAD lesion is 95% stenosed  · Prox RCA lesion is 100% stenosed  · 2nd Mrg lesion is 100% stenosed  Severe diffuse diabetic coronary disease  The grafts remain patent  The stent recently implanted in the left main/circumflex has become severely restenotic, with subtotal in-stent restenosis throughout the length of the circumflex trunk  The OM2 stent is occluded  The distal circumflex branches are supplied by patent grafts   The likelihood of durable patency for severe, rapidly developing in-stent restenosis over the entire length of a long stented segment in a diabetic patient is very low, resulting in an unfavorable risk/benefit ratio, particularly with patent distal grafts  Plan: aggressive medical therapy and lifestyle modification  US bedside procedure    Result Date: 9/28/2022  Narrative: 1 2 840 [id number]  2 446 161 [id number]  1 1    Echo complete w/ contrast if indicated    Result Date: 9/29/2022  Narrative: •  Left Ventricle: Left ventricular cavity size is normal  Wall thickness is normal  The left ventricular ejection fraction is 60%  Systolic function is normal  Wall motion is normal  Diastolic function is mildly abnormal, consistent with grade I (abnormal) relaxation  •  Right Ventricle: Right ventricular cavity size is normal  Systolic function is normal  •  Mitral Valve: There is mild regurgitation  •  Pulmonic Valve: There is mild regurgitation  NM myocardial perfusion spect (rx stress and/or rest)    Result Date: 9/29/2022  Narrative: •  Stress ECG: The ECG was negative for ischemia  The stress ECG is negative for ischemia after pharmacologic vasodilation, without reproduction of symptoms  •  Perfusion: There is a left ventricular perfusion defect that is overall large in size and moderate in severity involving the basal to mid anterolateral and inferolateral walls  There is also mild reversibility involving the anterior wall and apex  •  Stress Function: Left ventricular function post-stress is normal  Post-stress ejection fraction is 60 %  •  Perfusion Defect Conclusion: The stress/rest perfusion ratio is 1 14   There is no quantitative evidence of transient ischemic dilation (TID) but visually appreciated  Large area of reversibility/ischemia along with transient ischemic dilatation which is appreciable visually suggests the presence of multivessel CAD         Review of Systems:  Review of Systems   Cardiovascular: Positive for chest pain  Per HPI   Musculoskeletal: Positive for arthralgias, gait problem and myalgias  Using a cane to assist with ambulation    All other systems reviewed and are negative  Physical Exam:  Physical Exam  Vitals reviewed  Constitutional:       Appearance: He is obese  Cardiovascular:      Rate and Rhythm: Normal rate and regular rhythm  Pulses: Normal pulses  Heart sounds: Normal heart sounds  Pulmonary:      Effort: Pulmonary effort is normal       Breath sounds: Normal breath sounds  Abdominal:      General: Bowel sounds are normal       Palpations: Abdomen is soft  Musculoskeletal:         General: Normal range of motion  Cervical back: Normal range of motion and neck supple  Right lower leg: No edema  Left lower leg: No edema  Skin:     General: Skin is warm and dry  Capillary Refill: Capillary refill takes less than 2 seconds  Neurological:      General: No focal deficit present  Mental Status: He is alert and oriented to person, place, and time  Psychiatric:         Mood and Affect: Mood normal          Behavior: Behavior normal          Discussion/Summary:  1  CAD  10/03/[facility] showed   Ostial circ to proximal circ 99% stenosis, mid circ to distal circ 99% stenosis, proximal LAD 95% stenosis, proximal % stenosis, 2nd marginal lesion 100% stenosis  Severe diffuse diabetic coronary artery disease  Grafts remain patent  Stent recently implanted in the left main circumflex has become severely re stenotic with subtotal InStent re stenoses throughout the length of the circumflex trunk  OM 2 stent is occluded  Distal circumflex branches are supplied by patent grafts  Plan aggressive medical therapy and lifestyle modification  Shailesh Marcial continues with CP with walking up stairs  Managed by the [facility]  I have not made any changes to his medical management at this time  Would suggest up titrating  Ranexa to 1000mg BID for CP    Continue on Plavix 7mg daily, isosorbide dinitrate 20mg TID, Zetia 5mg daily and lipitor 80mg daily     2  Hypertension controlled on Metoprolol tartrate 50mg Q12 hours and chlorthalidone 12 5mg daily,lisinopril 40mg daily,  2gm sodium diet   3  Dyslipidemia continues on Zetia 5mg daily and Lipitor 80mg daily, DASH diet   4   DM2 Hgb A1C 6 8 on 9/29/22 follows with the [place] normal rate and rhythm, no chest pain and no edema.

## 2022-10-19 ENCOUNTER — OFFICE VISIT (OUTPATIENT)
Dept: CARDIOLOGY CLINIC | Facility: CLINIC | Age: 72
End: 2022-10-19
Payer: MEDICARE

## 2022-10-19 VITALS
OXYGEN SATURATION: 97 % | WEIGHT: 248.8 LBS | SYSTOLIC BLOOD PRESSURE: 124 MMHG | BODY MASS INDEX: 37.71 KG/M2 | HEIGHT: 68 IN | DIASTOLIC BLOOD PRESSURE: 66 MMHG | HEART RATE: 63 BPM

## 2022-10-19 DIAGNOSIS — E78.5 DYSLIPIDEMIA: ICD-10-CM

## 2022-10-19 DIAGNOSIS — E11.69 TYPE 2 DIABETES MELLITUS WITH OTHER SPECIFIED COMPLICATION, WITH LONG-TERM CURRENT USE OF INSULIN (HCC): ICD-10-CM

## 2022-10-19 DIAGNOSIS — I25.10 CORONARY ARTERY DISEASE INVOLVING NATIVE HEART, UNSPECIFIED VESSEL OR LESION TYPE, UNSPECIFIED WHETHER ANGINA PRESENT: Primary | ICD-10-CM

## 2022-10-19 DIAGNOSIS — I10 PRIMARY HYPERTENSION: ICD-10-CM

## 2022-10-19 DIAGNOSIS — Z79.4 TYPE 2 DIABETES MELLITUS WITH OTHER SPECIFIED COMPLICATION, WITH LONG-TERM CURRENT USE OF INSULIN (HCC): ICD-10-CM

## 2022-10-19 PROCEDURE — 99214 OFFICE O/P EST MOD 30 MIN: CPT | Performed by: INTERNAL MEDICINE

## 2022-10-28 ENCOUNTER — PATIENT OUTREACH (OUTPATIENT)
Dept: CASE MANAGEMENT | Facility: OTHER | Age: 72
End: 2022-10-28

## 2022-10-28 NOTE — PROGRESS NOTES
Chart reviewed  Patient was seen at [facility] office for hospital follow up on 10/19 and per note physical exam was negative  He was reeducated on the DASH diet and healthy living  His medications were not changed  I spoke with Hyun Claire who reports he feels well  He follows at the [address] [place] [address] and was seen 10/12  He has no needs at this time

## 2022-11-16 ENCOUNTER — PATIENT OUTREACH (OUTPATIENT)
Dept: CASE MANAGEMENT | Facility: OTHER | Age: 72
End: 2022-11-16

## 2022-12-01 ENCOUNTER — PATIENT OUTREACH (OUTPATIENT)
Dept: CASE MANAGEMENT | Facility: OTHER | Age: 72
End: 2022-12-01

## 2022-12-01 NOTE — PROGRESS NOTES
No response to last outreach  Chart reviewed  Patient is scheduled to have a stent placed at [facility] on 12/9

## 2022-12-02 PROBLEM — R50.9 FEVER: Status: RESOLVED | Noted: 2022-10-02 | Resolved: 2022-12-02

## 2022-12-14 ENCOUNTER — PATIENT OUTREACH (OUTPATIENT)
Dept: CASE MANAGEMENT | Facility: OTHER | Age: 72
End: 2022-12-14

## 2022-12-14 NOTE — PROGRESS NOTES
Chart reviewed  Patient had a PTCA with stent placement at [facility] on 12/9  Patient was discharged home  He has a follow up scheduled at [facility] cardiology 1/26

## 2022-12-25 ENCOUNTER — HOSPITAL ENCOUNTER (EMERGENCY)
Facility: HOSPITAL | Age: 72
Discharge: HOME | End: 2022-12-25
Attending: EMERGENCY MEDICINE

## 2022-12-25 VITALS
RESPIRATION RATE: 16 BRPM | SYSTOLIC BLOOD PRESSURE: 226 MMHG | HEART RATE: 78 BPM | DIASTOLIC BLOOD PRESSURE: 99 MMHG | OXYGEN SATURATION: 98 % | TEMPERATURE: 98.3 F

## 2022-12-25 DIAGNOSIS — S91.209A AVULSION OF TOENAIL, INITIAL ENCOUNTER: Primary | ICD-10-CM

## 2022-12-25 RX ADMIN — TETANUS TOXOID, REDUCED DIPHTHERIA TOXOID AND ACELLULAR PERTUSSIS VACCINE, ADSORBED 0.5 ML: 5; 2.5; 8; 8; 2.5 SUSPENSION INTRAMUSCULAR at 12:02

## 2022-12-25 NOTE — ED PROVIDER NOTES
History  Chief Complaint   Patient presents with   • Foot Injury     Pt stubbed R last toe lastnight and wont stop bleeding  Pt takes aspirin, plavix, and eliquis for heart disease  HPI     68 y/o M patient presenting to the ED with persistent bleeding of the foot at his right littlest toe  He states that last evening he stubbed his toe on the refrigerator and since then has been unable to stop the bleeding with pressure or bandaging at home  Patient says he does take Eliquis, aspirin, Plavix for cardiovascular issues  Prior to Admission Medications   Prescriptions Last Dose Informant Patient Reported? Taking?    EMPAGLIFLOZIN-LINAGLIPTIN PO   Yes No   Sig: Take 5 mg by mouth in the morning   Levothyroxine Sodium 88 MCG CAPS   Yes No   Sig: Take by mouth in the morning   apixaban (ELIQUIS) 5 mg   Yes No   Si mg 2 (two) times a day   atorvastatin (LIPITOR) 80 mg tablet   Yes No   Sig: Take 80 mg by mouth daily at bedtime     calcium-vitamin D (OSCAL) 250-125 MG-UNIT per tablet   Yes No   Sig: Take 2 tablets by mouth 2 (two) times a day   chlorthalidone 25 mg tablet   No No   Sig: Take 0 5 tablets (12 5 mg total) by mouth daily in the early morning   cholecalciferol (VITAMIN D3) 1,000 units tablet   Yes No   Sig: Take 2,000 Units by mouth daily   clonazePAM (KlonoPIN) 0 5 mg tablet   Yes No   Sig: Take 0 5 mg by mouth 2 (two) times a day   clopidogrel (PLAVIX) 75 mg tablet   Yes No   Sig: Take 75 mg by mouth daily   cyanocobalamin 1000 MCG tablet   Yes No   Sig: Take 100 mcg by mouth every 30 (thirty) days Injection   ezetimibe (ZETIA) 10 mg tablet   Yes No   Sig: Take 5 mg by mouth daily   insulin glargine (Lantus) 100 units/mL subcutaneous injection   Yes No   Sig: Inject 38 Units under the skin daily at bedtime   isosorbide dinitrate (ISORDIL) 20 mg tablet   Yes No   Sig: Take 20 mg by mouth 3 (three) times a day   lisinopril (ZESTRIL) 40 mg tablet   Yes No   Sig: Take 40 mg by mouth daily after breakfast     metFORMIN (GLUCOPHAGE) 1000 MG tablet   Yes No   Sig: Take 500 mg by mouth 2 (two) times a day with meals   metoprolol tartrate (LOPRESSOR) 50 mg tablet   No No   Sig: Take 1 tablet (50 mg total) by mouth every 12 (twelve) hours   potassium chloride (K-DUR,KLOR-CON) 10 mEq tablet   No No   Sig: Take 1 tablet (10 mEq total) by mouth daily Do not start before October 5, 2022  pregabalin (LYRICA) 100 mg capsule   Yes No   Sig: Take 100 mg by mouth 2 (two) times a day   ranolazine (RANEXA) 500 mg 12 hr tablet   No No   Sig: Take 1 tablet (500 mg total) by mouth every 12 (twelve) hours      Facility-Administered Medications: None       Past Medical History:   Diagnosis Date   • Atrial fibrillation (HCC)    • CAD (coronary artery disease)    • Diabetes mellitus (HCC)     IDDM   • Heart attack ([facility] [address] )    • Heart disease    • Hyperlipidemia    • Hypertension    • Myocardial infarction ([facility] [address] ) 1989   • Neuropathy    • S/P triple vessel bypass    • Sleep apnea     NO CPAP       Past Surgical History:   Procedure Laterality Date   • BACK SURGERY  2010    benign tumor removed from spine   • CARDIAC CATHETERIZATION Left 10/3/2022    Procedure: Cardiac Left Heart Cath;  Surgeon: Truman Webb MD;  Location: [facility]; Service: Cardiology   • CORONARY ANGIOPLASTY  2009   • SHOULDER SURGERY Right    • TONSILLECTOMY         History reviewed  No pertinent family history  I have reviewed and agree with the history as documented  E-Cigarette/Vaping   • E-Cigarette Use Never User      E-Cigarette/Vaping Substances     Social History     Tobacco Use   • Smoking status: Former   • Smokeless tobacco: Never   • Tobacco comments:     quit 1989   Vaping Use   • Vaping Use: Never used   Substance Use Topics   • Alcohol use: Not Currently     Comment: social   • Drug use: No        Review of Systems   Constitutional: Negative for chills and fever  HENT: Negative for ear pain and sore throat      Eyes: Negative for pain and visual disturbance  Respiratory: Negative for cough and shortness of breath  Cardiovascular: Negative for chest pain and palpitations  Gastrointestinal: Negative for abdominal pain and vomiting  Genitourinary: Negative for dysuria and hematuria  Musculoskeletal: Negative for arthralgias and back pain  Skin: Positive for wound  Negative for color change and rash  Persistent bleeding of the right smallest toe   Neurological: Negative for seizures and syncope  Hematological: Bruises/bleeds easily  All other systems reviewed and are negative  Physical Exam  ED Triage Vitals [12/25/22 1056]   Temperature Pulse Respirations Blood Pressure SpO2   98 3 °F (36 8 °C) 78 16 (!) 226/99 98 %      Temp Source Heart Rate Source Patient Position - Orthostatic VS BP Location FiO2 (%)   Oral Monitor Lying Right arm --      Pain Score       --             Orthostatic Vital Signs  Vitals:    12/25/22 1056   BP: (!) 226/99   Pulse: 78   Patient Position - Orthostatic VS: Lying       Physical Exam  Vitals and nursing note reviewed  Constitutional:       General: He is not in acute distress  Appearance: He is well-developed  HENT:      Head: Normocephalic and atraumatic  Eyes:      Conjunctiva/sclera: Conjunctivae normal    Cardiovascular:      Rate and Rhythm: Normal rate and regular rhythm  Heart sounds: No murmur heard  Pulmonary:      Effort: Pulmonary effort is normal  No respiratory distress  Breath sounds: Normal breath sounds  Abdominal:      Palpations: Abdomen is soft  Tenderness: There is no abdominal tenderness  Musculoskeletal:         General: No swelling  Cervical back: Neck supple  Skin:     General: Skin is warm and dry  Capillary Refill: Capillary refill takes less than 2 seconds  Findings: Signs of injury and wound present  Comments: Avulsion of the toenail of the right fifth toe with persistent oozing from the nailbed    Fourth toe also with nail injury, but not actively bleeding at this time  Neurological:      Mental Status: He is alert  Psychiatric:         Mood and Affect: Mood normal          ED Medications  Medications   tetanus-diphtheria-acellular pertussis (BOOSTRIX) IM injection 0 5 mL (0 5 mL Intramuscular Given 12/25/22 1202)       Diagnostic Studies  Results Reviewed     None                 No orders to display         Procedures  Procedures      ED Course               SBIRT 22yo+    Flowsheet Row Most Recent Value   SBIRT (23 yo +)    In order to provide better care to our patients, we are screening all of our patients for alcohol and drug use  Would it be okay to ask you these screening questions? Unable to answer at this time Filed at: 12/25/2022 1105                MDM  Number of Diagnoses or Management Options  Avulsion of toenail, initial encounter: minor  Diagnosis management comments: 77-year-old male patient on significant anticoagulation presenting with persistent bleeding from right toe wound since injury yesterday  On initial evaluation, patient did also appear to have complete avulsion of the right fifth toe nail  Given the larger surface area of bleeding, the area was not amenable to suture repair  Surgicel and quick clot dressing was applied to the area and significant pressure was held for over 5 minutes time  Gauze dressing was applied and wrapped with Ace bandage to aid in application of further pressure  On reevaluation, bleeding had slowed significantly and patient was discharged in stable condition  Counseled to return if bleeding returns and soaks through Ace wrap         Amount and/or Complexity of Data Reviewed  Review and summarize past medical records: yes  Independent visualization of images, tracings, or specimens: yes    Risk of Complications, Morbidity, and/or Mortality  Presenting problems: minimal  Diagnostic procedures: minimal  Management options: minimal        Disposition  Final diagnoses:   Avulsion of toenail, initial encounter     Time reflects when diagnosis was documented in both MDM as applicable and the Disposition within this note     Time User Action Codes Description Comment    12/25/2022 12:22 PM Elizabeth Mazariegos Add [K11 [id number]] Avulsion of toenail, initial encounter       ED Disposition     ED Disposition   Discharge    Condition   Stable    Date/Time   Sun Dec 25, 2022 [place] discharge to home/self care                 Follow-up Information    None         Discharge Medication List as of 12/25/2022 12:44 PM      CONTINUE these medications which have NOT CHANGED    Details   apixaban (ELIQUIS) 5 mg 5 mg 2 (two) times a day, Historical Med      atorvastatin (LIPITOR) 80 mg tablet Take 80 mg by mouth daily at bedtime  , Historical Med      calcium-vitamin D (OSCAL) 250-125 MG-UNIT per tablet Take 2 tablets by mouth 2 (two) times a day, Historical Med      chlorthalidone 25 mg tablet Take 0 5 tablets (12 5 mg total) by mouth daily in the early morning, Starting Tue 10/4/2022, No Print      cholecalciferol (VITAMIN D3) 1,000 units tablet Take 2,000 Units by mouth daily, Historical Med      clonazePAM (KlonoPIN) 0 5 mg tablet Take 0 5 mg by mouth 2 (two) times a day, Historical Med      clopidogrel (PLAVIX) 75 mg tablet Take 75 mg by mouth daily, Historical Med      cyanocobalamin 1000 MCG tablet Take 100 mcg by mouth every 30 (thirty) days Injection, Historical Med      EMPAGLIFLOZIN-LINAGLIPTIN PO Take 5 mg by mouth in the morning, Historical Med      ezetimibe (ZETIA) 10 mg tablet Take 5 mg by mouth daily, Historical Med      insulin glargine (Lantus) 100 units/mL subcutaneous injection Inject 38 Units under the skin daily at bedtime, Historical Med      isosorbide dinitrate (ISORDIL) 20 mg tablet Take 20 mg by mouth 3 (three) times a day, Historical Med      Levothyroxine Sodium 88 MCG CAPS Take by mouth in the morning, Historical Med      lisinopril (ZESTRIL) 40 mg tablet Take 40 mg by mouth daily after breakfast  , Historical Med      metFORMIN (GLUCOPHAGE) 1000 MG tablet Take 500 mg by mouth 2 (two) times a day with meals, Historical Med      metoprolol tartrate (LOPRESSOR) 50 mg tablet Take 1 tablet (50 mg total) by mouth every 12 (twelve) hours, Starting Tue 10/4/2022, Normal      potassium chloride (K-DUR,KLOR-CON) 10 mEq tablet Take 1 tablet (10 mEq total) by mouth daily Do not start before October 5, 2022 , Starting Wed 10/5/2022, Normal      pregabalin (LYRICA) 100 mg capsule Take 100 mg by mouth 2 (two) times a day, Historical Med      ranolazine (RANEXA) 500 mg 12 hr tablet Take 1 tablet (500 mg total) by mouth every 12 (twelve) hours, Starting Tue 10/4/2022, Normal           No discharge procedures on file  PDMP Review       Value Time User    PDMP Reviewed  Yes 10/2/2022  4:57 PM Susan Panda MD           ED Provider  Attending physically available and evaluated Gerald Wheat I managed the patient along with the ED Attending      Electronically Signed by  Nia Benoit, [address] [place] [address], DO  12/25/22 2304

## 2022-12-25 NOTE — ED ATTENDING ATTESTATION
12/25/2022  I, Chica Mccabe MD, saw and evaluated the patient  I have discussed the patient with the resident/non-physician practitioner and agree with the resident's/non-physician practitioner's findings, Plan of Care, and MDM as documented in the resident's/non-physician practitioner's note, except where noted  All available labs and Radiology studies were reviewed  I was present for key portions of any procedure(s) performed by the resident/non-physician practitioner and I was immediately available to provide assistance  At this point I agree with the current assessment done in the Emergency Department  I have conducted an independent evaluation of this patient a history and physical is as follows: Avulsion injury to right small toe last night  On aspirin/Plavix/Eliquis  Denies systemic symptoms  Continues to ooze blood this morning  Review of Systems - Negative except right small toe oozing blood  Physical Exam  Vitals and nursing note reviewed  Constitutional:       General: He is not in acute distress  Appearance: He is well-developed  He is not diaphoretic  HENT:      Head: Normocephalic and atraumatic  Right Ear: External ear normal       Left Ear: External ear normal    Eyes:      General:         Right eye: No discharge  Left eye: No discharge  Pupils: Pupils are equal, round, and reactive to light  Neck:      Thyroid: No thyromegaly  Trachea: No tracheal deviation  Cardiovascular:      Rate and Rhythm: Normal rate and regular rhythm  Heart sounds: No murmur heard  Pulmonary:      Effort: Pulmonary effort is normal       Breath sounds: Normal breath sounds  Abdominal:      General: Bowel sounds are normal  There is no distension  Palpations: Abdomen is soft  Tenderness: There is no abdominal tenderness  Musculoskeletal:         General: No deformity  Normal range of motion        Cervical back: Normal range of motion and neck supple  Skin:     General: Skin is warm  Capillary Refill: Capillary refill takes less than 2 seconds  Comments: Avulsion to distal area of right small toe  No suturable laceration  Neurological:      Mental Status: He is alert and oriented to person, place, and time  Cranial Nerves: No cranial nerve deficit  Motor: No abnormal muscle tone  Psychiatric:         Behavior: Behavior normal        Bleeding stopped with direct pressure and prothrombotic dressing  No labs/imaging indicated          ED Course         Critical Care Time  Procedures

## 2022-12-27 ENCOUNTER — PATIENT OUTREACH (OUTPATIENT)
Dept: CASE MANAGEMENT | Facility: OTHER | Age: 72
End: 2022-12-27

## 2022-12-27 NOTE — PROGRESS NOTES
ADT alert received  Patient was seen in the ED at [facility] on 12/25 with an avulsion of toenail  Patient is on Eliquis and Plavix and the wound was bleeding after patient applied pressure at home  He was treated with a sugicel dressing and discharged home

## 2023-01-03 ENCOUNTER — PATIENT OUTREACH (OUTPATIENT)
Dept: CASE MANAGEMENT | Facility: OTHER | Age: 73
End: 2023-01-03

## 2023-11-24 NOTE — QUICK NOTE
Called to bedside for new onset chest pain concerning for ACS  Pt states sharp stabbing substernal chest pain started a few minutes ago, 8/10, worsened to 10/10 with radiation to left shoulder and down left arm accompanied by nausea, SOB and diaphoresis  Pain improved s/p nitroglycerin, currently rates 3/10  ECG unchanged from prior (Sinus rhythm, TX int 0 228s, QRS < 0 120s, occasional PVCs)  Pt currently planned for catheterization in a   HS Troponin ordered  Will consult cardiology for possible emergent care pending results 
Received an update per Dr Migue Huerta that due to patient's anatomy and prior interventions he is too high risk for cath at [facility] and therefore recommend transfer to [place]  I have updated the patient and his wife of these recommendations  We will resume his diet  Recommendations were discussed with Dr Gasper Felix 
spouse

## 2024-08-27 ENCOUNTER — APPOINTMENT (INPATIENT)
Dept: CT IMAGING | Facility: HOSPITAL | Age: 74
DRG: 872 | End: 2024-08-27
Payer: COMMERCIAL

## 2024-08-27 ENCOUNTER — APPOINTMENT (EMERGENCY)
Dept: RADIOLOGY | Facility: HOSPITAL | Age: 74
DRG: 872 | End: 2024-08-27
Payer: COMMERCIAL

## 2024-08-27 ENCOUNTER — APPOINTMENT (EMERGENCY)
Dept: MRI IMAGING | Facility: HOSPITAL | Age: 74
DRG: 872 | End: 2024-08-27
Payer: COMMERCIAL

## 2024-08-27 ENCOUNTER — HOSPITAL ENCOUNTER (INPATIENT)
Facility: HOSPITAL | Age: 74
LOS: 3 days | Discharge: HOME HEALTH | DRG: 872 | End: 2024-08-30
Attending: EMERGENCY MEDICINE | Admitting: INTERNAL MEDICINE
Payer: COMMERCIAL

## 2024-08-27 DIAGNOSIS — R55 SYNCOPE: ICD-10-CM
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
## 2024-08-27 DIAGNOSIS — Z79.4 TYPE 2 DIABETES MELLITUS WITH OTHER SPECIFIED COMPLICATION, WITH LONG-TERM CURRENT USE OF INSULIN (HCC): ICD-10-CM

## 2024-08-27 DIAGNOSIS — R11.10 VOMITING: ICD-10-CM

## 2024-08-27 DIAGNOSIS — M54.50 LOW BACK PAIN: ICD-10-CM

## 2024-08-27 DIAGNOSIS — E11.69 TYPE 2 DIABETES MELLITUS WITH OTHER SPECIFIED COMPLICATION, WITH LONG-TERM CURRENT USE OF INSULIN (HCC): ICD-10-CM

## 2024-08-27 DIAGNOSIS — I95.9 HYPOTENSION: ICD-10-CM

## 2024-08-27 PROBLEM — R65.10 SYSTEMIC INFLAMMATORY RESPONSE SYNDROME (HCC): Status: ACTIVE | Noted: 2024-08-27

## 2024-08-27 PROBLEM — R65.20 SEVERE SEPSIS (HCC): Status: ACTIVE | Noted: 2024-08-27

## 2024-08-27 PROBLEM — A41.9 SEVERE SEPSIS (HCC): Status: ACTIVE | Noted: 2024-08-27

## 2024-08-27 PROBLEM — M79.605 LEFT LEG PAIN: Status: ACTIVE | Noted: 2024-08-27

## 2024-08-27 PROBLEM — R29.6 FALLS: Status: ACTIVE | Noted: 2024-08-27

## 2024-08-27 LAB
ALBUMIN SERPL BCG-MCNC: 3.3 G/DL (ref 3.5–5)
ALP SERPL-CCNC: 80 U/L (ref 34–104)
ALT SERPL W P-5'-P-CCNC: 26 U/L (ref 7–52)
ANION GAP SERPL CALCULATED.3IONS-SCNC: 11 MMOL/L (ref 4–13)
APTT PPP: 35 SECONDS (ref 23–34)
AST SERPL W P-5'-P-CCNC: 63 U/L (ref 13–39)
ATRIAL RATE: 90 BPM
BACTERIA UR QL AUTO: ABNORMAL /HPF
BASOPHILS # BLD AUTO: 0.06 THOUSANDS/ÂΜL (ref 0–0.1)
BASOPHILS NFR BLD AUTO: 0 % (ref 0–1)
BILIRUB SERPL-MCNC: 0.76 MG/DL (ref 0.2–1)
BILIRUB UR QL STRIP: NEGATIVE
BUN SERPL-MCNC: 26 MG/DL (ref 5–25)
CALCIUM ALBUM COR SERPL-MCNC: 9.5 MG/DL (ref 8.3–10.1)
CALCIUM SERPL-MCNC: 8.9 MG/DL (ref 8.4–10.2)
CARDIAC TROPONIN I PNL SERPL HS: 4 NG/L
CHLORIDE SERPL-SCNC: 101 MMOL/L (ref 96–108)
CLARITY UR: CLEAR
CO2 SERPL-SCNC: 23 MMOL/L (ref 21–32)
COLOR UR: YELLOW
CREAT SERPL-MCNC: 1.29 MG/DL (ref 0.6–1.3)
EOSINOPHIL # BLD AUTO: 0.27 THOUSAND/ÂΜL (ref 0–0.61)
EOSINOPHIL NFR BLD AUTO: 2 % (ref 0–6)
ERYTHROCYTE [DISTWIDTH] IN BLOOD BY AUTOMATED COUNT: 14.7 % (ref 11.6–15.1)
FLUAV RNA RESP QL NAA+PROBE: NEGATIVE
FLUBV RNA RESP QL NAA+PROBE: NEGATIVE
GFR SERPL CREATININE-BSD FRML MDRD: 54 ML/MIN/1.73SQ M
GLUCOSE SERPL-MCNC: 172 MG/DL (ref 65–140)
GLUCOSE SERPL-MCNC: 174 MG/DL (ref 65–140)
GLUCOSE UR STRIP-MCNC: ABNORMAL MG/DL
HCT VFR BLD AUTO: 39.2 % (ref 36.5–49.3)
HGB BLD-MCNC: 12.8 G/DL (ref 12–17)
HGB UR QL STRIP.AUTO: NEGATIVE
HYALINE CASTS #/AREA URNS LPF: ABNORMAL /LPF
IMM GRANULOCYTES # BLD AUTO: 0.1 THOUSAND/UL (ref 0–0.2)
IMM GRANULOCYTES NFR BLD AUTO: 1 % (ref 0–2)
INR PPP: 1.23 (ref 0.85–1.19)
KETONES UR STRIP-MCNC: NEGATIVE MG/DL
LACTATE SERPL-SCNC: 1.5 MMOL/L (ref 0.5–2)
LEUKOCYTE ESTERASE UR QL STRIP: ABNORMAL
LYMPHOCYTES # BLD AUTO: 2.84 THOUSANDS/ÂΜL (ref 0.6–4.47)
LYMPHOCYTES NFR BLD AUTO: 19 % (ref 14–44)
MCH RBC QN AUTO: 29.6 PG (ref 26.8–34.3)
MCHC RBC AUTO-ENTMCNC: 32.7 G/DL (ref 31.4–37.4)
MCV RBC AUTO: 91 FL (ref 82–98)
MONOCYTES # BLD AUTO: 1.08 THOUSAND/ÂΜL (ref 0.17–1.22)
MONOCYTES NFR BLD AUTO: 7 % (ref 4–12)
MUCOUS THREADS UR QL AUTO: ABNORMAL
NEUTROPHILS # BLD AUTO: 10.79 THOUSANDS/ÂΜL (ref 1.85–7.62)
NEUTS SEG NFR BLD AUTO: 71 % (ref 43–75)
NITRITE UR QL STRIP: POSITIVE
NON-SQ EPI CELLS URNS QL MICRO: ABNORMAL /HPF
NRBC BLD AUTO-RTO: 0 /100 WBCS
P AXIS: 41 DEGREES
PH UR STRIP.AUTO: 5.5 [PH]
PLATELET # BLD AUTO: 265 THOUSANDS/UL (ref 149–390)
PMV BLD AUTO: 12.1 FL (ref 8.9–12.7)
POTASSIUM SERPL-SCNC: 4.2 MMOL/L (ref 3.5–5.3)
PR INTERVAL: 214 MS
PROCALCITONIN SERPL-MCNC: 0.07 NG/ML
PROT SERPL-MCNC: 6.2 G/DL (ref 6.4–8.4)
PROT UR STRIP-MCNC: ABNORMAL MG/DL
PROTHROMBIN TIME: 16.3 SECONDS (ref 12.3–15)
QRS AXIS: 267 DEGREES
QRSD INTERVAL: 84 MS
QT INTERVAL: 366 MS
QTC INTERVAL: 447 MS
RBC # BLD AUTO: 4.32 MILLION/UL (ref 3.88–5.62)
RBC #/AREA URNS AUTO: ABNORMAL /HPF
RSV RNA RESP QL NAA+PROBE: NEGATIVE
SARS-COV-2 RNA RESP QL NAA+PROBE: NEGATIVE
SODIUM SERPL-SCNC: 135 MMOL/L (ref 135–147)
SP GR UR STRIP.AUTO: 1.03 (ref 1–1.03)
T WAVE AXIS: 95 DEGREES
UROBILINOGEN UR STRIP-ACNC: <2 MG/DL
VENTRICULAR RATE: 90 BPM
WBC # BLD AUTO: 15.14 THOUSAND/UL (ref 4.31–10.16)
WBC #/AREA URNS AUTO: ABNORMAL /HPF

## 2024-08-27 PROCEDURE — 73564 X-RAY EXAM KNEE 4 OR MORE: CPT

## 2024-08-27 PROCEDURE — 82948 REAGENT STRIP/BLOOD GLUCOSE: CPT

## 2024-08-27 PROCEDURE — 36415 COLL VENOUS BLD VENIPUNCTURE: CPT

## 2024-08-27 PROCEDURE — 71046 X-RAY EXAM CHEST 2 VIEWS: CPT

## 2024-08-27 PROCEDURE — 87040 BLOOD CULTURE FOR BACTERIA: CPT | Performed by: PHYSICIAN ASSISTANT

## 2024-08-27 PROCEDURE — 70450 CT HEAD/BRAIN W/O DYE: CPT

## 2024-08-27 PROCEDURE — 99285 EMERGENCY DEPT VISIT HI MDM: CPT

## 2024-08-27 PROCEDURE — 72158 MRI LUMBAR SPINE W/O & W/DYE: CPT

## 2024-08-27 PROCEDURE — 99223 1ST HOSP IP/OBS HIGH 75: CPT

## 2024-08-27 PROCEDURE — 83036 HEMOGLOBIN GLYCOSYLATED A1C: CPT

## 2024-08-27 PROCEDURE — 85025 COMPLETE CBC W/AUTO DIFF WBC: CPT | Performed by: EMERGENCY MEDICINE

## 2024-08-27 PROCEDURE — 96365 THER/PROPH/DIAG IV INF INIT: CPT

## 2024-08-27 PROCEDURE — 93005 ELECTROCARDIOGRAM TRACING: CPT

## 2024-08-27 PROCEDURE — 80053 COMPREHEN METABOLIC PANEL: CPT | Performed by: EMERGENCY MEDICINE

## 2024-08-27 PROCEDURE — 96361 HYDRATE IV INFUSION ADD-ON: CPT

## 2024-08-27 PROCEDURE — 0241U HB NFCT DS VIR RESP RNA 4 TRGT: CPT | Performed by: PHYSICIAN ASSISTANT

## 2024-08-27 PROCEDURE — A9585 GADOBUTROL INJECTION: HCPCS | Performed by: EMERGENCY MEDICINE

## 2024-08-27 PROCEDURE — 84484 ASSAY OF TROPONIN QUANT: CPT | Performed by: EMERGENCY MEDICINE

## 2024-08-27 PROCEDURE — 81001 URINALYSIS AUTO W/SCOPE: CPT | Performed by: PHYSICIAN ASSISTANT

## 2024-08-27 PROCEDURE — 84145 PROCALCITONIN (PCT): CPT | Performed by: PHYSICIAN ASSISTANT

## 2024-08-27 PROCEDURE — 73590 X-RAY EXAM OF LOWER LEG: CPT

## 2024-08-27 PROCEDURE — 85610 PROTHROMBIN TIME: CPT | Performed by: EMERGENCY MEDICINE

## 2024-08-27 PROCEDURE — 83605 ASSAY OF LACTIC ACID: CPT | Performed by: PHYSICIAN ASSISTANT

## 2024-08-27 PROCEDURE — 99285 EMERGENCY DEPT VISIT HI MDM: CPT | Performed by: PHYSICIAN ASSISTANT

## 2024-08-27 PROCEDURE — 85730 THROMBOPLASTIN TIME PARTIAL: CPT | Performed by: EMERGENCY MEDICINE

## 2024-08-27 RX ORDER — ACETAMINOPHEN 325 MG/1
650 TABLET ORAL EVERY 6 HOURS PRN
Status: DISCONTINUED | OUTPATIENT
Start: 2024-08-27 | End: 2024-08-30 | Stop reason: HOSPADM

## 2024-08-27 RX ORDER — CLONAZEPAM 0.5 MG/1
0.5 TABLET ORAL DAILY
Status: DISCONTINUED | OUTPATIENT
Start: 2024-08-28 | End: 2024-08-30 | Stop reason: HOSPADM

## 2024-08-27 RX ORDER — CLONAZEPAM 1 MG/1
1 TABLET ORAL
Status: DISCONTINUED | OUTPATIENT
Start: 2024-08-28 | End: 2024-08-30 | Stop reason: HOSPADM

## 2024-08-27 RX ORDER — ISOSORBIDE DINITRATE 10 MG/1
20 TABLET ORAL 3 TIMES DAILY
Status: DISCONTINUED | OUTPATIENT
Start: 2024-08-28 | End: 2024-08-29

## 2024-08-27 RX ORDER — GADOBUTROL 604.72 MG/ML
10 INJECTION INTRAVENOUS
Status: COMPLETED | OUTPATIENT
Start: 2024-08-27 | End: 2024-08-27

## 2024-08-27 RX ORDER — ATORVASTATIN CALCIUM 40 MG/1
80 TABLET, FILM COATED ORAL
Status: DISCONTINUED | OUTPATIENT
Start: 2024-08-27 | End: 2024-08-30 | Stop reason: HOSPADM

## 2024-08-27 RX ORDER — METOPROLOL TARTRATE 50 MG
50 TABLET ORAL EVERY 12 HOURS SCHEDULED
Status: DISCONTINUED | OUTPATIENT
Start: 2024-08-27 | End: 2024-08-30 | Stop reason: HOSPADM

## 2024-08-27 RX ORDER — EZETIMIBE 10 MG/1
5 TABLET ORAL DAILY
Status: DISCONTINUED | OUTPATIENT
Start: 2024-08-28 | End: 2024-08-30 | Stop reason: HOSPADM

## 2024-08-27 RX ORDER — CHLORTHALIDONE 25 MG/1
12.5 TABLET ORAL
Status: DISCONTINUED | OUTPATIENT
Start: 2024-08-28 | End: 2024-08-28

## 2024-08-27 RX ORDER — CLOPIDOGREL BISULFATE 75 MG/1
75 TABLET ORAL DAILY
Status: DISCONTINUED | OUTPATIENT
Start: 2024-08-28 | End: 2024-08-30 | Stop reason: HOSPADM

## 2024-08-27 RX ORDER — LEVOTHYROXINE SODIUM 88 UG/1
88 TABLET ORAL
Status: DISCONTINUED | OUTPATIENT
Start: 2024-08-28 | End: 2024-08-30 | Stop reason: HOSPADM

## 2024-08-27 RX ORDER — RANOLAZINE 500 MG/1
500 TABLET, EXTENDED RELEASE ORAL EVERY 12 HOURS SCHEDULED
Status: DISCONTINUED | OUTPATIENT
Start: 2024-08-27 | End: 2024-08-30 | Stop reason: HOSPADM

## 2024-08-27 RX ORDER — LISINOPRIL 20 MG/1
40 TABLET ORAL
Status: DISCONTINUED | OUTPATIENT
Start: 2024-08-28 | End: 2024-08-28

## 2024-08-27 RX ORDER — PREGABALIN 100 MG/1
100 CAPSULE ORAL 2 TIMES DAILY
Status: DISCONTINUED | OUTPATIENT
Start: 2024-08-28 | End: 2024-08-30 | Stop reason: HOSPADM

## 2024-08-27 RX ADMIN — SODIUM CHLORIDE 52 ML: 0.9 INJECTION, SOLUTION INTRAVENOUS at 19:51

## 2024-08-27 RX ADMIN — SODIUM CHLORIDE 1000 ML: 0.9 INJECTION, SOLUTION INTRAVENOUS at 19:10

## 2024-08-27 RX ADMIN — SODIUM CHLORIDE 1000 ML: 0.9 INJECTION, SOLUTION INTRAVENOUS at 22:01

## 2024-08-27 RX ADMIN — SODIUM CHLORIDE 500 ML: 0.9 INJECTION, SOLUTION INTRAVENOUS at 17:44

## 2024-08-27 RX ADMIN — CEFTRIAXONE SODIUM 2000 MG: 10 INJECTION, POWDER, FOR SOLUTION INTRAVENOUS at 19:58

## 2024-08-27 RX ADMIN — GADOBUTROL 10 ML: 604.72 INJECTION INTRAVENOUS at 17:17

## 2024-08-27 RX ADMIN — SODIUM CHLORIDE 500 ML: 0.9 INJECTION, SOLUTION INTRAVENOUS at 16:24

## 2024-08-27 NOTE — ED PROVIDER NOTES
History  Chief Complaint   Patient presents with    Fall     Pt states he has been having multiple falls at home, reports negative HS +thinners. Pt currently complaining of L leg pain from the lower leg to the thigh.        73yo-year-old male presents to emergency room for evaluation after falling once a day for the past 3 days.  The first day he slipped on a rug going out of the house and landed with all of his weight on his left leg outstretched.  He was able to get up and walk after this.  Yesterday he had a very minor fall while getting dressed.  However today while he was walking down the steps he felt a little dizzy and then slipped and fell forward down 4 steps.  He denies head injury or loss of consciousness.  Patient states he was able to get up and walk after the fall today too.  He complains of low back pain progressively worsening.  He notes that he does have chronic herniated disks.  He does admit to some new urinary retention and incontinence.  Denies saddle anesthesia.  Denies fever.  Denies chest pain or shortness of breath.  Denies neck pain.  Denies arm paresthesia or weakness.  Patient denies headache.  He does state he is on Eliquis.  Patient states he has not felt sick recently.  He is eating and drinking normally.  Wife states he has been mentating normally as well.      History provided by:  Patient  Fall  Associated symptoms: back pain    Associated symptoms: no abdominal pain, no chest pain, no headaches, no nausea, no neck pain and no vomiting        Prior to Admission Medications   Prescriptions Last Dose Informant Patient Reported? Taking?   EMPAGLIFLOZIN-LINAGLIPTIN PO  Self Yes No   Sig: Take 5 mg by mouth in the morning   Levothyroxine Sodium 88 MCG CAPS  Self Yes No   Sig: Take by mouth in the morning   apixaban (ELIQUIS) 5 mg  Self Yes No   Si mg 2 (two) times a day   atorvastatin (LIPITOR) 80 mg tablet  Self Yes No   Sig: Take 80 mg by mouth daily at bedtime     calcium-vitamin  D (OSCAL) 250-125 MG-UNIT per tablet  Self Yes No   Sig: Take 2 tablets by mouth 2 (two) times a day   chlorthalidone 25 mg tablet  Self No No   Sig: Take 0.5 tablets (12.5 mg total) by mouth daily in the early morning   cholecalciferol (VITAMIN D3) 1,000 units tablet  Self Yes No   Sig: Take 2,000 Units by mouth daily   clonazePAM (KlonoPIN) 0.5 mg tablet  Self Yes No   Sig: Take 0.5 mg by mouth 2 (two) times a day   clopidogrel (PLAVIX) 75 mg tablet  Self Yes No   Sig: Take 75 mg by mouth daily   cyanocobalamin 1000 MCG tablet  Self Yes No   Sig: Take 100 mcg by mouth every 30 (thirty) days Injection   ezetimibe (ZETIA) 10 mg tablet  Self Yes No   Sig: Take 5 mg by mouth daily   insulin glargine (Lantus) 100 units/mL subcutaneous injection  Self Yes No   Sig: Inject 38 Units under the skin daily at bedtime   isosorbide dinitrate (ISORDIL) 20 mg tablet  Self Yes No   Sig: Take 20 mg by mouth 3 (three) times a day   lisinopril (ZESTRIL) 40 mg tablet  Self Yes No   Sig: Take 40 mg by mouth daily after breakfast     metFORMIN (GLUCOPHAGE) 1000 MG tablet  Self Yes No   Sig: Take 500 mg by mouth 2 (two) times a day with meals   metoprolol tartrate (LOPRESSOR) 50 mg tablet  Self No No   Sig: Take 1 tablet (50 mg total) by mouth every 12 (twelve) hours   potassium chloride (K-DUR,KLOR-CON) 10 mEq tablet  Self No No   Sig: Take 1 tablet (10 mEq total) by mouth daily Do not start before October 5, 2022.   pregabalin (LYRICA) 100 mg capsule  Self Yes No   Sig: Take 100 mg by mouth 2 (two) times a day   ranolazine (RANEXA) 500 mg 12 hr tablet  Self No No   Sig: Take 1 tablet (500 mg total) by mouth every 12 (twelve) hours      Facility-Administered Medications: None       Past Medical History:   Diagnosis Date    Atrial fibrillation (HCC)     CAD (coronary artery disease)     Diabetes mellitus (HCC)     IDDM    Heart attack (HCC)     Heart disease     Hyperlipidemia     Hypertension     Myocardial infarction (HCC) 1989     Neuropathy     S/P triple vessel bypass     Sleep apnea     NO CPAP       Past Surgical History:   Procedure Laterality Date    BACK SURGERY  2010    benign tumor removed from spine    CARDIAC CATHETERIZATION Left 10/3/2022    Procedure: Cardiac Left Heart Cath;  Surgeon: Spencer Levy MD;  Location: [facility];  Service: Cardiology    CORONARY ANGIOPLASTY  2009    SHOULDER SURGERY Right     TONSILLECTOMY         History reviewed. No pertinent family history.  I have reviewed and agree with the history as documented.    E-Cigarette/Vaping    E-Cigarette Use Never User      E-Cigarette/Vaping Substances     Social History     Tobacco Use    Smoking status: Former    Smokeless tobacco: Never    Tobacco comments:     quit 1989   Vaping Use    Vaping status: Never Used   Substance Use Topics    Alcohol use: Not Currently     Comment: social    Drug use: No       Review of Systems   Constitutional:  Negative for chills and fever.   HENT:  Negative for facial swelling.    Respiratory:  Negative for shortness of breath.    Cardiovascular:  Negative for chest pain.   Gastrointestinal:  Positive for constipation. Negative for abdominal pain, nausea and vomiting.   Genitourinary:  Positive for difficulty urinating.   Musculoskeletal:  Positive for back pain. Negative for neck pain.   Skin:  Positive for wound.   Neurological:  Positive for light-headedness. Negative for weakness and headaches.   All other systems reviewed and are negative.      Physical Exam  Physical Exam  Vitals and nursing note reviewed. Exam conducted with a chaperone present (RN).   Constitutional:       Appearance: Normal appearance. He is well-developed.   HENT:      Head: Normocephalic and atraumatic.      Right Ear: External ear normal.      Left Ear: External ear normal.      Nose: Nose normal.   Eyes:      General: No scleral icterus.     Conjunctiva/sclera: Conjunctivae normal.   Cardiovascular:      Rate and Rhythm: Normal rate and  regular rhythm.      Heart sounds: Normal heart sounds.   Pulmonary:      Effort: Pulmonary effort is normal.      Breath sounds: Normal breath sounds.   Abdominal:      General: Bowel sounds are normal. There is no distension.      Palpations: Abdomen is soft.      Tenderness: There is no abdominal tenderness. There is no right CVA tenderness or left CVA tenderness.   Genitourinary:     Prostate: Not tender.      Rectum: Guaiac result negative. No tenderness. Abnormal anal tone (decreased strength of sphincter).      Comments: Hard stool in rectal vault  Musculoskeletal:      Cervical back: Normal, normal range of motion and neck supple. No tenderness.      Thoracic back: Normal.      Lumbar back: Tenderness and bony tenderness present. Positive left straight leg raise test.      Right hip: Normal.      Left hip: No tenderness or bony tenderness. Decreased range of motion.      Right upper leg: Normal. No deformity.      Left upper leg: No deformity or bony tenderness.      Right knee: Normal range of motion. No tenderness.      Left knee: Normal range of motion. No tenderness.      Right lower leg: No deformity or bony tenderness.      Left lower leg: No deformity or bony tenderness.      Right ankle: Normal. Normal pulse.      Left ankle: Normal. Normal pulse.      Right foot: Normal. Normal capillary refill. No bony tenderness. Normal pulse.      Left foot: Normal capillary refill. No swelling, deformity or bony tenderness. Normal pulse.        Legs:         Feet:       Comments: 5/5 Strength BLE flexion and extension  No foot drop.  Able to walk     Skin:     General: Skin is warm and dry.      Capillary Refill: Capillary refill takes less than 2 seconds.      Findings: No rash.   Neurological:      General: No focal deficit present.      Mental Status: He is alert and oriented to person, place, and time. Mental status is at baseline.   Psychiatric:         Mood and Affect: Mood normal.         Vital Signs  ED  Triage Vitals [08/27/24 1506]   Temperature Pulse Respirations Blood Pressure SpO2   98.4 °F (36.9 °C) 102 18 (S) (!) 84/54 100 %      Temp Source Heart Rate Source Patient Position - Orthostatic VS BP Location FiO2 (%)   Oral Monitor Lying Right arm --      Pain Score       --           Vitals:    08/27/24 2015 08/27/24 2022 08/27/24 2030 08/27/24 2045   BP: 118/56 121/56 109/59 110/57   Pulse: 71 69 66 66   Patient Position - Orthostatic VS:             Visual Acuity      ED Medications  Medications   sodium chloride 0.9 % bolus 500 mL (0 mL Intravenous Stopped 8/27/24 1736)   Gadobutrol injection (SINGLE-DOSE) SOLN 10 mL (10 mL Intravenous Given 8/27/24 1717)   sodium chloride 0.9 % bolus 500 mL (0 mL Intravenous Stopped 8/27/24 1844)   sodium chloride 0.9 % bolus 1,000 mL (1,000 mL Intravenous New Bag 8/27/24 1910)   sodium chloride 0.9 % bolus 52 mL (52 mL Intravenous New Bag 8/27/24 1951)   ceftriaxone (ROCEPHIN) 2 g/50 mL in dextrose IVPB (2,000 mg Intravenous New Bag 8/27/24 1958)       Diagnostic Studies  Results Reviewed       Procedure Component Value Units Date/Time    Lactic acid [[id number]]  (Normal) Collected: 08/27/24 1955    Lab Status: Final result Specimen: Blood from Arm, Right Updated: 08/27/24 2025     LACTIC ACID 1.5 mmol/L     Narrative:      Result may be elevated if tourniquet was used during collection.    FLU/RSV/COVID - if FLU/RSV clinically relevant [[id number]] Collected: 08/27/24 1951    Lab Status: In process Specimen: Nares from Nose Updated: 08/27/24 2001    Blood culture #2 [[id number]] Collected: 08/27/24 1955    Lab Status: In process Specimen: Blood from Arm, Right Updated: 08/27/24 2000    Blood culture #1 [[id number]] Collected: 08/27/24 1955    Lab Status: In process Specimen: Blood from Arm, Left Updated: 08/27/24 2000    Procalcitonin [[id number]]  (Normal) Collected: 08/27/24 1522    Lab Status: Final result Specimen: Blood from Arm, Left Updated: 08/27/24 1943      Procalcitonin 0.07 ng/ml     Protime-INR [[id number]]  (Abnormal) Collected: 08/27/24 1522    Lab Status: Final result Specimen: Blood from Arm, Left Updated: 08/27/24 1607     Protime 16.3 seconds      INR 1.23    Narrative:      INR Therapeutic Range    Indication                                             INR Range      Atrial Fibrillation                                               2.0-3.0  Hypercoagulable State                                    2.0.2.3  Left Ventricular Asist Device                            2.0-3.0  Mechanical Heart Valve                                  -    Aortic(with afib, MI, embolism, HF, LA enlargement,    and/or coagulopathy)                                     2.0-3.0 (2.5-3.5)     Mitral                                                             2.5-3.5  Prosthetic/Bioprosthetic Heart Valve               2.0-3.0  Venous thromboembolism (VTE: VT, PE        2.0-3.0    APTT [[id number]]  (Abnormal) Collected: 08/27/24 1522    Lab Status: Final result Specimen: Blood from Arm, Left Updated: 08/27/24 1607     PTT 35 seconds     HS Troponin 0hr (reflex protocol) [[id number]]  (Normal) Collected: 08/27/24 1522    Lab Status: Final result Specimen: Blood from Arm, Left Updated: 08/27/24 1559     hs TnI 0hr 4 ng/L     Comprehensive metabolic panel [[id number]]  (Abnormal) Collected: 08/27/24 1522    Lab Status: Final result Specimen: Blood from Arm, Left Updated: 08/27/24 1552     Sodium 135 mmol/L      Potassium 4.2 mmol/L      Chloride 101 mmol/L      CO2 23 mmol/L      ANION GAP 11 mmol/L      BUN 26 mg/dL      Creatinine 1.29 mg/dL      Glucose 174 mg/dL      Calcium 8.9 mg/dL      Corrected Calcium 9.5 mg/dL      AST 63 U/L      ALT 26 U/L      Alkaline Phosphatase 80 U/L      Total Protein 6.2 g/dL      Albumin 3.3 g/dL      Total Bilirubin 0.76 mg/dL      eGFR 54 ml/min/1.73sq m     Narrative:      [employer] guidelines for Chronic Kidney Disease (CKD):      Stage 1 with normal or high GFR (GFR > 90 mL/min/1.73 square meters)    Stage 2 Mild CKD (GFR = 60-89 mL/min/1.73 square meters)    Stage 3A Moderate CKD (GFR = 45-59 mL/min/1.73 square meters)    Stage 3B Moderate CKD (GFR = 30-44 mL/min/1.73 square meters)    Stage 4 Severe CKD (GFR = 15-29 mL/min/1.73 square meters)    Stage 5 End Stage CKD (GFR <15 mL/min/1.73 square meters)  Note: GFR calculation is accurate only with a steady state creatinine    UA w Reflex to Microscopic w Reflex to Culture [[id number]]     Lab Status: No result Specimen: Urine     CBC and differential [[id number]]  (Abnormal) Collected: 08/27/24 1522    Lab Status: Final result Specimen: Blood from Arm, Left Updated: 08/27/24 1534     WBC 15.14 Thousand/uL      RBC 4.32 Million/uL      Hemoglobin 12.8 g/dL      Hematocrit 39.2 %      MCV 91 fL      MCH 29.6 pg      MCHC 32.7 g/dL      RDW 14.7 %      MPV 12.1 fL      Platelets 265 Thousands/uL      nRBC 0 /100 WBCs      Segmented % 71 %      Immature Grans % 1 %      Lymphocytes % 19 %      Monocytes % 7 %      Eosinophils Relative 2 %      Basophils Relative 0 %      Absolute Neutrophils 10.79 Thousands/µL      Absolute Immature Grans 0.10 Thousand/uL      Absolute Lymphocytes 2.84 Thousands/µL      Absolute Monocytes 1.08 Thousand/µL      Eosinophils Absolute 0.27 Thousand/µL      Basophils Absolute 0.06 Thousands/µL     Fingerstick Glucose (POCT) [[id number]]  (Abnormal) Collected: 08/27/24 1511    Lab Status: Final result Specimen: Blood Updated: 08/27/24 1512     POC Glucose 172 mg/dl                    XR chest pa & lateral   ED Interpretation by Yasmin Eckert PA-C (08/27 1959)   NAD      MRI lumbar spine w wo contrast   Final Result by Napoleon Ibarra MD (08/27 1846)      Chronic disc and facet degenerative change at the lumbar spine resulting in nerve root encroachment.         Workstation performed: [id number]         XR knee 4+ views LEFT   ED Interpretation by Yasmin Eckert,  CALLIE (08/27 1700)   NAD      XR tibia fibula 2 views LEFT   ED Interpretation by Yasmin Eckert PA-C (08/27 1700)   NAD      CT head without contrast    (Results Pending)              Procedures  ECG 12 Lead Documentation Only    Date/Time: 8/27/2024 4:15 PM    Performed by: Yasmin Eckert PA-C  Authorized by: Yasmin Eckert PA-C    Indications / Diagnosis:  Dizzy  ECG reviewed by me, the ED Provider: yes    Patient location:  ED  Previous ECG:     Previous ECG:  Compared to current    Comparison ECG info:  9/29/22    Similarity:  No change  Interpretation:     Interpretation: abnormal    Rate:     ECG rate:  90    ECG rate assessment: normal    Rhythm:     Rhythm: sinus rhythm and A-V block      Rhythm comment:  1st degree  Ectopy:     Ectopy: none    QRS:     QRS axis:  Normal  Conduction:     Conduction: normal    ST segments:     ST segments:  Normal  T waves:     T waves: normal             ED Course  ED Course as of 08/27/24 2102   Tue Aug 27, 2024   1739 RN reported patient passed out when he was pivoting from MRI back to the bed.  Patient states he just feels tired, he is alert and oriented, will order another fluid bolus.   1812 Patient remains alert and orientated   1853 Patient remains in asymptomatic, he is alert and oriented    1900 Case discussed with ED attending, will add sepsis to the differential, and revaluate after another fluid bolus, if his BP does not respond we will start pressors. Plan reviewed with patient and wife, they understand   1938 Blood Pressure: 101/57  Responding to trendelenburg position and fluids   2048 Patient remains happy and comfortable, SLIM paged for admission   2100 SLIM requested a CT of the head and agreed to inpatient admission                               Initial Sepsis Screening       Row Name 08/27/24 1900                Is the patient's history suggestive of a new or worsening infection? Yes (Proceed)  -SF        Suspected source of infection urinary tract  "infection  -SF        Indicate SIRS criteria Leukocytosis (WBC > 04356 IJL) OR Leukopenia (WBC <4000 IJL) OR Bandemia (WBC >10% bands);Tachycardia > 90 bpm  -SF        Are two or more of the above signs & symptoms of infection both present and new to the patient? Yes (Proceed)  -SF        Assess for evidence of organ dysfunction: Are any of the below criteria present within 6 hours of suspected infection and SIRS criteria that are NOT considered to be chronic conditions? SBP < 90  -SF        Date of presentation of septic shock 08/27/24  -SF        Time of presentation of septic shock 1900  -SF        Fluid Resuscitation: 30 ml/kg IV fluid bolus will be given based on ideal body weight (use if BMI >30)  -SF        Is the patient is persistently hypotensive in the hour after fluid bolus administration? If yes, patient meets criteria for vasopressor use. NO  -SF        Sepsis Note: Click \"NEXT\" below (NOT \"close\") to generate sepsis note based on above information. YES (proceed by clicking \"NEXT\")  -SF                  User Key  (r) = Recorded By, (t) = Taken By, (c) = Cosigned By      Initials Name Provider Type    SF Yasmin Eckert PA-C Physician Assistant                                  Medical Decision Making  Differential diagnosis includes but is not limited to: Compression fracture, worsening herniated discs, cauda equina, abrasions, contusions, ACS, ALEJO, worsening neuropathy, DKA, UTI, constipation    Amount and/or Complexity of Data Reviewed  External Data Reviewed: notes.     Details: Tdap 2022  Labs: ordered.  Radiology: ordered and independent interpretation performed.  ECG/medicine tests: ordered and independent interpretation performed.  Discussion of management or test interpretation with external provider(s): ED attending Dr. Danish Gómez  Prescription drug management.                 Disposition  Final diagnoses:   Fall   Low back pain   Syncope   Hypotension     Time reflects when diagnosis was " documented in both MDM as applicable and the Disposition within this note       Time User Action Codes Description Comment    8/27/2024  8:59 PM Babar Yasmin SMITH Add [W19.XXXA] Fall     8/27/2024  8:59 PM Yasmin Eckert Add [M54.50] Low back pain     8/27/2024  8:59 PM Babar Yasmin L Add [R55] Syncope     8/27/2024  8:59 PM Babar Yasmin L Add [I95.9] Hypotension           ED Disposition       ED Disposition   Admit    Condition   Stable    Date/Time   Tue Aug 27, 2024  8:59 PM    Comment   Case was discussed with Glenn LEDESMA and the patient's admission status was agreed to be Admission Status: inpatient status to the service of Dr. Rosario .               Follow-up Information    None         Patient's Medications   Discharge Prescriptions    No medications on file       No discharge procedures on file.    PDMP Review         Value Time User    PDMP Reviewed  Yes 10/2/2022  4:57 PM Gary Panda MD            ED Provider  Electronically Signed by             Yasmin Eckert PA-C  08/27/24 4349

## 2024-08-28 ENCOUNTER — APPOINTMENT (INPATIENT)
Dept: NON INVASIVE DIAGNOSTICS | Facility: HOSPITAL | Age: 74
DRG: 872 | End: 2024-08-28
Payer: COMMERCIAL

## 2024-08-28 LAB
ALBUMIN SERPL BCG-MCNC: 2.8 G/DL (ref 3.5–5)
ALP SERPL-CCNC: 66 U/L (ref 34–104)
ALT SERPL W P-5'-P-CCNC: 22 U/L (ref 7–52)
ANION GAP SERPL CALCULATED.3IONS-SCNC: 5 MMOL/L (ref 4–13)
AORTIC ROOT: 3.7 CM
APICAL FOUR CHAMBER EJECTION FRACTION: 60 %
ASCENDING AORTA: 3.4 CM
AST SERPL W P-5'-P-CCNC: 44 U/L (ref 13–39)
BASOPHILS # BLD AUTO: 0.06 THOUSANDS/ÂΜL (ref 0–0.1)
BASOPHILS NFR BLD AUTO: 1 % (ref 0–1)
BILIRUB SERPL-MCNC: 0.6 MG/DL (ref 0.2–1)
BSA FOR ECHO PROCEDURE: 2.17 M2
BUN SERPL-MCNC: 24 MG/DL (ref 5–25)
CALCIUM ALBUM COR SERPL-MCNC: 9 MG/DL (ref 8.3–10.1)
CALCIUM SERPL-MCNC: 8 MG/DL (ref 8.4–10.2)
CHLORIDE SERPL-SCNC: 108 MMOL/L (ref 96–108)
CO2 SERPL-SCNC: 26 MMOL/L (ref 21–32)
CREAT SERPL-MCNC: 1.15 MG/DL (ref 0.6–1.3)
E WAVE DECELERATION TIME: 197 MS
E/A RATIO: 1.03
EOSINOPHIL # BLD AUTO: 0.29 THOUSAND/ÂΜL (ref 0–0.61)
EOSINOPHIL NFR BLD AUTO: 3 % (ref 0–6)
ERYTHROCYTE [DISTWIDTH] IN BLOOD BY AUTOMATED COUNT: 14.6 % (ref 11.6–15.1)
EST. AVERAGE GLUCOSE BLD GHB EST-MCNC: 128 MG/DL
FRACTIONAL SHORTENING: 29 (ref 28–44)
GFR SERPL CREATININE-BSD FRML MDRD: 62 ML/MIN/1.73SQ M
GLUCOSE SERPL-MCNC: 102 MG/DL (ref 65–140)
GLUCOSE SERPL-MCNC: 117 MG/DL (ref 65–140)
GLUCOSE SERPL-MCNC: 120 MG/DL (ref 65–140)
GLUCOSE SERPL-MCNC: 123 MG/DL (ref 65–140)
GLUCOSE SERPL-MCNC: 147 MG/DL (ref 65–140)
HBA1C MFR BLD: 6.1 %
HCT VFR BLD AUTO: 32.9 % (ref 36.5–49.3)
HGB BLD-MCNC: 10.7 G/DL (ref 12–17)
IMM GRANULOCYTES # BLD AUTO: 0.06 THOUSAND/UL (ref 0–0.2)
IMM GRANULOCYTES NFR BLD AUTO: 1 % (ref 0–2)
INTERVENTRICULAR SEPTUM IN DIASTOLE (PARASTERNAL SHORT AXIS VIEW): 1.2 CM
INTERVENTRICULAR SEPTUM: 1.2 CM (ref 0.6–1.1)
LAAS-AP2: 16.6 CM2
LAAS-AP4: 15.6 CM2
LEFT ATRIUM SIZE: 3.6 CM
LEFT ATRIUM VOLUME (MOD BIPLANE): 43 ML
LEFT ATRIUM VOLUME INDEX (MOD BIPLANE): 19.8 ML/M2
LEFT INTERNAL DIMENSION IN SYSTOLE: 3 CM (ref 2.1–4)
LEFT VENTRICULAR INTERNAL DIMENSION IN DIASTOLE: 4.2 CM (ref 3.5–6)
LEFT VENTRICULAR POSTERIOR WALL IN END DIASTOLE: 1.1 CM
LEFT VENTRICULAR STROKE VOLUME: 45 ML
LVSV (TEICH): 45 ML
LYMPHOCYTES # BLD AUTO: 3.2 THOUSANDS/ÂΜL (ref 0.6–4.47)
LYMPHOCYTES NFR BLD AUTO: 28 % (ref 14–44)
MCH RBC QN AUTO: 29.8 PG (ref 26.8–34.3)
MCHC RBC AUTO-ENTMCNC: 32.5 G/DL (ref 31.4–37.4)
MCV RBC AUTO: 92 FL (ref 82–98)
MONOCYTES # BLD AUTO: 1.03 THOUSAND/ÂΜL (ref 0.17–1.22)
MONOCYTES NFR BLD AUTO: 9 % (ref 4–12)
MV E'TISSUE VEL-SEP: 9 CM/S
MV PEAK A VEL: 0.65 M/S
MV PEAK E VEL: 67 CM/S
MV STENOSIS PRESSURE HALF TIME: 57 MS
MV VALVE AREA P 1/2 METHOD: 3.86
NEUTROPHILS # BLD AUTO: 6.7 THOUSANDS/ÂΜL (ref 1.85–7.62)
NEUTS SEG NFR BLD AUTO: 58 % (ref 43–75)
NRBC BLD AUTO-RTO: 0 /100 WBCS
PLATELET # BLD AUTO: 189 THOUSANDS/UL (ref 149–390)
PMV BLD AUTO: 11.7 FL (ref 8.9–12.7)
POTASSIUM SERPL-SCNC: 4.1 MMOL/L (ref 3.5–5.3)
PROT SERPL-MCNC: 5.3 G/DL (ref 6.4–8.4)
RBC # BLD AUTO: 3.59 MILLION/UL (ref 3.88–5.62)
RIGHT ATRIAL 2D VOLUME: 36 ML
RIGHT ATRIUM AREA SYSTOLE A4C: 16.1 CM2
RIGHT VENTRICLE ID DIMENSION: 3.8 CM
SL CV LEFT ATRIUM LENGTH A2C: 4.7 CM
SL CV LV EF: 60
SL CV PED ECHO LEFT VENTRICLE DIASTOLIC VOLUME (MOD BIPLANE) 2D: 81 ML
SL CV PED ECHO LEFT VENTRICLE SYSTOLIC VOLUME (MOD BIPLANE) 2D: 36 ML
SODIUM SERPL-SCNC: 139 MMOL/L (ref 135–147)
TR MAX PG: 22 MMHG
TR PEAK VELOCITY: 2.4 M/S
TRICUSPID ANNULAR PLANE SYSTOLIC EXCURSION: 1.6 CM
TRICUSPID VALVE PEAK REGURGITATION VELOCITY: 2.37 M/S
WBC # BLD AUTO: 11.34 THOUSAND/UL (ref 4.31–10.16)

## 2024-08-28 PROCEDURE — 93306 TTE W/DOPPLER COMPLETE: CPT

## 2024-08-28 PROCEDURE — 93306 TTE W/DOPPLER COMPLETE: CPT | Performed by: INTERNAL MEDICINE

## 2024-08-28 PROCEDURE — 85025 COMPLETE CBC W/AUTO DIFF WBC: CPT

## 2024-08-28 PROCEDURE — 99233 SBSQ HOSP IP/OBS HIGH 50: CPT | Performed by: INTERNAL MEDICINE

## 2024-08-28 PROCEDURE — 80053 COMPREHEN METABOLIC PANEL: CPT

## 2024-08-28 PROCEDURE — 82948 REAGENT STRIP/BLOOD GLUCOSE: CPT

## 2024-08-28 RX ORDER — ALBUMIN (HUMAN) 12.5 G/50ML
12.5 SOLUTION INTRAVENOUS ONCE
Status: COMPLETED | OUTPATIENT
Start: 2024-08-28 | End: 2024-08-28

## 2024-08-28 RX ORDER — POLYETHYLENE GLYCOL 3350 17 G/17G
17 POWDER, FOR SOLUTION ORAL DAILY
Status: DISCONTINUED | OUTPATIENT
Start: 2024-08-28 | End: 2024-08-30 | Stop reason: HOSPADM

## 2024-08-28 RX ORDER — AMOXICILLIN 250 MG
1 CAPSULE ORAL
Status: DISCONTINUED | OUTPATIENT
Start: 2024-08-28 | End: 2024-08-30 | Stop reason: HOSPADM

## 2024-08-28 RX ORDER — INSULIN GLARGINE 100 [IU]/ML
28 INJECTION, SOLUTION SUBCUTANEOUS
Status: DISCONTINUED | OUTPATIENT
Start: 2024-08-28 | End: 2024-08-30 | Stop reason: HOSPADM

## 2024-08-28 RX ORDER — INSULIN LISPRO 100 [IU]/ML
1-6 INJECTION, SOLUTION INTRAVENOUS; SUBCUTANEOUS
Status: DISCONTINUED | OUTPATIENT
Start: 2024-08-28 | End: 2024-08-30 | Stop reason: HOSPADM

## 2024-08-28 RX ORDER — SODIUM CHLORIDE, SODIUM GLUCONATE, SODIUM ACETATE, POTASSIUM CHLORIDE, MAGNESIUM CHLORIDE, SODIUM PHOSPHATE, DIBASIC, AND POTASSIUM PHOSPHATE .53; .5; .37; .037; .03; .012; .00082 G/100ML; G/100ML; G/100ML; G/100ML; G/100ML; G/100ML; G/100ML
1000 INJECTION, SOLUTION INTRAVENOUS ONCE
Status: COMPLETED | OUTPATIENT
Start: 2024-08-28 | End: 2024-08-28

## 2024-08-28 RX ORDER — SODIUM CHLORIDE, SODIUM GLUCONATE, SODIUM ACETATE, POTASSIUM CHLORIDE, MAGNESIUM CHLORIDE, SODIUM PHOSPHATE, DIBASIC, AND POTASSIUM PHOSPHATE .53; .5; .37; .037; .03; .012; .00082 G/100ML; G/100ML; G/100ML; G/100ML; G/100ML; G/100ML; G/100ML
100 INJECTION, SOLUTION INTRAVENOUS CONTINUOUS
Status: DISPENSED | OUTPATIENT
Start: 2024-08-28 | End: 2024-08-28

## 2024-08-28 RX ORDER — INSULIN LISPRO 100 [IU]/ML
1-5 INJECTION, SOLUTION INTRAVENOUS; SUBCUTANEOUS
Status: DISCONTINUED | OUTPATIENT
Start: 2024-08-28 | End: 2024-08-30 | Stop reason: HOSPADM

## 2024-08-28 RX ADMIN — SODIUM CHLORIDE, SODIUM GLUCONATE, SODIUM ACETATE, POTASSIUM CHLORIDE, MAGNESIUM CHLORIDE, SODIUM PHOSPHATE, DIBASIC, AND POTASSIUM PHOSPHATE 1000 ML: .53; .5; .37; .037; .03; .012; .00082 INJECTION, SOLUTION INTRAVENOUS at 11:47

## 2024-08-28 RX ADMIN — ALBUMIN (HUMAN) 12.5 G: 0.25 INJECTION, SOLUTION INTRAVENOUS at 11:40

## 2024-08-28 RX ADMIN — LISINOPRIL 40 MG: 20 TABLET ORAL at 08:37

## 2024-08-28 RX ADMIN — RANOLAZINE 500 MG: 500 TABLET, FILM COATED, EXTENDED RELEASE ORAL at 00:38

## 2024-08-28 RX ADMIN — ATORVASTATIN CALCIUM 80 MG: 40 TABLET, FILM COATED ORAL at 20:27

## 2024-08-28 RX ADMIN — APIXABAN 5 MG: 5 TABLET, FILM COATED ORAL at 17:01

## 2024-08-28 RX ADMIN — PREGABALIN 100 MG: 100 CAPSULE ORAL at 17:01

## 2024-08-28 RX ADMIN — APIXABAN 5 MG: 5 TABLET, FILM COATED ORAL at 00:38

## 2024-08-28 RX ADMIN — INSULIN GLARGINE 28 UNITS: 100 INJECTION, SOLUTION SUBCUTANEOUS at 21:39

## 2024-08-28 RX ADMIN — SENNOSIDES AND DOCUSATE SODIUM 1 TABLET: 8.6; 5 TABLET ORAL at 21:39

## 2024-08-28 RX ADMIN — ISOSORBIDE DINITRATE 20 MG: 10 TABLET ORAL at 20:26

## 2024-08-28 RX ADMIN — CLONAZEPAM 0.5 MG: 0.5 TABLET ORAL at 08:37

## 2024-08-28 RX ADMIN — METOPROLOL TARTRATE 50 MG: 50 TABLET, FILM COATED ORAL at 08:37

## 2024-08-28 RX ADMIN — CEFTRIAXONE SODIUM 2000 MG: 10 INJECTION, POWDER, FOR SOLUTION INTRAVENOUS at 21:39

## 2024-08-28 RX ADMIN — POLYETHYLENE GLYCOL 3350 17 G: 17 POWDER, FOR SOLUTION ORAL at 10:50

## 2024-08-28 RX ADMIN — ISOSORBIDE DINITRATE 20 MG: 10 TABLET ORAL at 08:37

## 2024-08-28 RX ADMIN — ATORVASTATIN CALCIUM 80 MG: 40 TABLET, FILM COATED ORAL at 00:38

## 2024-08-28 RX ADMIN — ISOSORBIDE DINITRATE 20 MG: 10 TABLET ORAL at 17:01

## 2024-08-28 RX ADMIN — APIXABAN 5 MG: 5 TABLET, FILM COATED ORAL at 08:37

## 2024-08-28 RX ADMIN — RANOLAZINE 500 MG: 500 TABLET, FILM COATED, EXTENDED RELEASE ORAL at 08:37

## 2024-08-28 RX ADMIN — LEVOTHYROXINE SODIUM 88 MCG: 88 TABLET ORAL at 05:12

## 2024-08-28 RX ADMIN — PREGABALIN 100 MG: 100 CAPSULE ORAL at 08:37

## 2024-08-28 RX ADMIN — INSULIN GLARGINE 28 UNITS: 100 INJECTION, SOLUTION SUBCUTANEOUS at 00:42

## 2024-08-28 RX ADMIN — CLOPIDOGREL BISULFATE 75 MG: 75 TABLET ORAL at 08:37

## 2024-08-28 RX ADMIN — METOPROLOL TARTRATE 50 MG: 50 TABLET, FILM COATED ORAL at 20:28

## 2024-08-28 RX ADMIN — SODIUM CHLORIDE, SODIUM GLUCONATE, SODIUM ACETATE, POTASSIUM CHLORIDE, MAGNESIUM CHLORIDE, SODIUM PHOSPHATE, DIBASIC, AND POTASSIUM PHOSPHATE 100 ML/HR: .53; .5; .37; .037; .03; .012; .00082 INJECTION, SOLUTION INTRAVENOUS at 13:53

## 2024-08-28 RX ADMIN — EZETIMIBE 5 MG: 10 TABLET ORAL at 08:37

## 2024-08-28 RX ADMIN — CLONAZEPAM 1 MG: 1 TABLET ORAL at 21:39

## 2024-08-28 RX ADMIN — RANOLAZINE 500 MG: 500 TABLET, FILM COATED, EXTENDED RELEASE ORAL at 20:28

## 2024-08-28 NOTE — ASSESSMENT & PLAN NOTE
SIRS criteria met is tachycardia and leukocytosis  Source likely UTI, however patient has no complaints of dysuria, hematuria, flank pain, pelvic pain   UA significant for nitrates, leukocytes, pending microscopic  Urine culture pending  CXR no findings of acute cardiopulmonary disease, pending official read  Initial lactic 1.5  Pro-Vitaliy of 0.07  Blood cultures x 2 pending  Plan  Patient initially received 2102 mL IVF, 30 cc/kg = 3120 mL, will order 1 L and reassess BP  Patient apparently started on Rocephin, will continue with Rocephin pending urine culture, de-escalate as appropriate

## 2024-08-28 NOTE — H&P
[facility]  H&P  Name: Pete Maysr 74 y.o. male I MRN: [id number]  Unit/Bed#: -01 I Date of Admission: 8/27/2024   Date of Service: 8/28/2024 I Hospital Day: 1      Assessment & Plan   * Severe sepsis due to UTI (HCC)  Assessment & Plan  SIRS criteria met is tachycardia and leukocytosis  Source likely UTI, however patient has no complaints of dysuria, hematuria, flank pain, pelvic pain   UA significant for nitrates, leukocytes, pending microscopic  Urine culture pending  CXR no findings of acute cardiopulmonary disease, pending official read  Initial lactic 1.5  Pro-Vitaliy of 0.07  Blood cultures x 2 pending  Plan  Patient initially received 2102 mL IVF, 30 cc/kg = 3120 mL, will order 1 L and reassess BP  Patient apparently started on Rocephin, will continue with Rocephin pending urine culture, de-escalate as appropriate      Falls  Assessment & Plan  Presenting for daily mechanical falls at home over the past 3 days, pt on eliquis. Pt slipped on rug, lost balance while getting dressed, fell down 4 stairs today. Denies head strike. Associated dizziness, left leg pain, back pain that is chronic. Pt had episode of near syncope due to dizziness while pivoting at MRI, found to be hypotensive. Responded well to 2L IVF.   Pt not evaluated by trauma at the ED providers discretion.  No head strike, no prodromal chest pain, SOB, diaphoresis.  Multiple falls mechanical in nature (slipping on rug, losing balance while changing/ walking down stairs) suspect falls are multifactorial in etiology with h/o hypotension, dizziness, ambulatory dysfunction 2/2 left leg injury and chronic low back pain.   CT head negative for acute intracranial abnormality, mild to moderate chronic small vessel ischemic changes  No new neurological deficits noted on physical exam, patient does have diabetic peripheral neuropathy and is decreased sensation to bilateral feet  Plan  Check orthostatic vital signs  Continue IV  "fluids  Monitor on tele  PT/OT eval and treat   Fall precautions  Monitor on tele x 24hr    Left leg pain  Assessment & Plan  Presenting with left posterior, constant \"muscle straining\" pain that extends from the thigh down to the calf.  Patient obtained this injury after slipping on the rug 3 days ago.  X-ray of left tibia and left knee unremarkable for traumatic injury  MRI lumbar spine noted with chronic degenerative changes/disc herniation  Continue supportive measures with Ace bandages, ice packs, as needed Tylenol  PT/OT eval and treat    History of coronary artery disease  Assessment & Plan  S/p CABG x 3 and PCI in 2022   Follows with [facility] cardiology    Initial trop 4   EKG unchanged from prior with NSR and known 1st degree AV block     Type 2 diabetes mellitus (HCC)  Assessment & Plan  Lab Results   Component Value Date    HGBA1C 6.8 (H) 09/29/2022       Recent Labs     08/27/24  1511   POCGLU 172*       Blood Sugar Average: Last 72 hrs:  (P) 172  Home regimen of empagliflozin-linagliptin, Lantus 28 units HS, metformin 1000mg BID   Will continue Lantus HS and place on sliding scale insulin w/ meals   Hypoglycemia protocol   Repeat Hgb A1c   Carb controled diet    Hypothyroidism  Assessment & Plan  Continue levothyroxine 88mcg daily    Atrial fibrillation (HCC)  Assessment & Plan  Rate controled on metoprolol, anticoagulated on eliquis   EKG shows NSR with 1st degree AV block similar to prior     Hyperlipidemia  Assessment & Plan  Continue lipitor and zetia     Hypertension  Assessment & Plan  Pt initially hypotensive on presentation, responded well to IVF   Will continue home regimen of chlorthalidone, lisinopril and metoprolol with hold parameters   Continue to monitor BP and avoid hypotension        VTE Pharmacologic Prophylaxis: VTE Score: 5 High Risk (Score >/= 5) - Pharmacological DVT Prophylaxis Ordered: apixaban (Eliquis). Sequential Compression Devices Ordered.  Code Status: Level 1 - Full Code " "patient  Discussion with family: Patient declined call to .     Anticipated Length of Stay: Patient will be admitted on an inpatient basis with an anticipated length of stay of greater than 2 midnights secondary to severe sepsis requiring IV fluids and antibiotics, hypertension.    Total Time Spent on Date of Encounter in care of patient: 75 mins. This time was spent on one or more of the following: performing physical exam; counseling and coordination of care; obtaining or reviewing history; documenting in the medical record; reviewing/ordering tests, medications or procedures; communicating with other healthcare professionals and discussing with patient's family/caregivers.    Chief Complaint: Falls    History of Present Illness:  Pete Wetzel is a 74 y.o. male with a PMH of type 2 diabetes, hypothyroidism, hyperlipidemia, CAD s/p CABG x 3, A-fib on Eliquis, obesity who presents with frequent falling over the last 3 days that are mechanical in nature, with 1 episode of near syncope in the ED this evening while pivoting to sit on the MRI machine.  Patient states that on Sunday he tripped over a rug resulting in left hamstring/calf pain. Monday he fell while getting changed, and today he slipped down 4 stairs which prompted him to go come to the ED for evaluation.  He states he felt \"hypoglycemic\" after the fall with lightheadedness and photophobia.  EMS found patient to be hypotensive at home.  He denies any prodromal dizziness, chest pain, SOB, numbness, paresthesias, diaphoresis prior to these falls.     In the ED patient's UA consistent with UTI, patient declines any suprapubic pain, dysuria, hematuria, flank pain, fever, chills.     Patient also notes chronic lumbar pain that is unchanged from prior with known urinary incontinence.    Review of Systems:  Review of Systems   Constitutional:  Negative for chills, diaphoresis, fatigue and fever.   HENT:  Negative for drooling, tinnitus and trouble " swallowing.    Eyes:  Positive for photophobia and visual disturbance. Negative for redness.   Respiratory:  Negative for cough, chest tightness and shortness of breath.    Cardiovascular:  Negative for chest pain, palpitations and leg swelling.   Gastrointestinal:  Negative for abdominal pain and nausea.   Genitourinary:  Positive for difficulty urinating. Negative for dysuria, flank pain and hematuria.   Musculoskeletal:  Positive for back pain.   Skin:  Positive for wound.   Neurological:  Positive for light-headedness. Negative for dizziness, seizures, syncope, weakness and headaches.   Psychiatric/Behavioral:  Negative for confusion.        Past Medical and Surgical History:   Past Medical History:   Diagnosis Date    Atrial fibrillation (HCC)     CAD (coronary artery disease)     Diabetes mellitus (HCC)     IDDM    Heart attack (HCC)     Heart disease     Hyperlipidemia     Hypertension     Myocardial infarction (HCC) 1989    Neuropathy     S/P triple vessel bypass     Sleep apnea     NO CPAP       Past Surgical History:   Procedure Laterality Date    BACK SURGERY  2010    benign tumor removed from spine    CARDIAC CATHETERIZATION Left 10/3/2022    Procedure: Cardiac Left Heart Cath;  Surgeon: Spencer Levy MD;  Location: [facility];  Service: Cardiology    CORONARY ANGIOPLASTY  2009    SHOULDER SURGERY Right     TONSILLECTOMY         Meds/Allergies:  Prior to Admission medications    Medication Sig Start Date End Date Taking? Authorizing Provider   apixaban (ELIQUIS) 5 mg 5 mg 2 (two) times a day    Historical Provider, MD   atorvastatin (LIPITOR) 80 mg tablet Take 80 mg by mouth daily at bedtime      Historical Provider, MD   calcium-vitamin D (OSCAL) 250-125 MG-UNIT per tablet Take 2 tablets by mouth 2 (two) times a day    Historical Provider, MD   chlorthalidone 25 mg tablet Take 0.5 tablets (12.5 mg total) by mouth daily in the early morning 10/4/22   Valdo Austin DO   cholecalciferol (VITAMIN  D3) 1,000 units tablet Take 2,000 Units by mouth daily    Historical Provider, MD   clonazePAM (KlonoPIN) 0.5 mg tablet Take 0.5 mg by mouth 2 (two) times a day    Historical Provider, MD   clopidogrel (PLAVIX) 75 mg tablet Take 75 mg by mouth daily    Historical Provider, MD   cyanocobalamin 1000 MCG tablet Take 100 mcg by mouth every 30 (thirty) days Injection    Historical Provider, MD   EMPAGLIFLOZIN-LINAGLIPTIN PO Take 5 mg by mouth in the morning    Historical Provider, MD   ezetimibe (ZETIA) 10 mg tablet Take 5 mg by mouth daily    Historical Provider, MD   insulin glargine (Lantus) 100 units/mL subcutaneous injection Inject 38 Units under the skin daily at bedtime    Historical Provider, MD   isosorbide dinitrate (ISORDIL) 20 mg tablet Take 20 mg by mouth 3 (three) times a day    Historical Provider, MD   Levothyroxine Sodium 88 MCG CAPS Take by mouth in the morning    Historical Provider, MD   lisinopril (ZESTRIL) 40 mg tablet Take 40 mg by mouth daily after breakfast      Historical Provider, MD   metFORMIN (GLUCOPHAGE) 1000 MG tablet Take 500 mg by mouth 2 (two) times a day with meals    Historical Provider, MD   metoprolol tartrate (LOPRESSOR) 50 mg tablet Take 1 tablet (50 mg total) by mouth every 12 (twelve) hours 10/4/22   Valdo Austin DO   potassium chloride (K-DUR,KLOR-CON) 10 mEq tablet Take 1 tablet (10 mEq total) by mouth daily Do not start before October 5, 2022. 10/5/22   Valdo Austin DO   pregabalin (LYRICA) 100 mg capsule Take 100 mg by mouth 2 (two) times a day    Historical Provider, MD   ranolazine (RANEXA) 500 mg 12 hr tablet Take 1 tablet (500 mg total) by mouth every 12 (twelve) hours 10/4/22   Valdo Austin DO     I have reviewed home medications with patient personally.    Allergies:   Allergies   Allergen Reactions    Amlodipine Lip Swelling       Social History:  Marital Status: /Civil Union   Occupation: Retired  Patient Pre-hospital Living Situation: Home, With  "spouse  Patient Pre-hospital Level of Mobility:  Walks with cane at home, rollator outside of home  Patient Pre-hospital Diet Restrictions: Diabetic  Substance Use History:   Social History     Substance and Sexual Activity   Alcohol Use Not Currently    Comment: social     Social History     Tobacco Use   Smoking Status Former   Smokeless Tobacco Never   Tobacco Comments    quit 1989     Social History     Substance and Sexual Activity   Drug Use No       Family History:  History reviewed. No pertinent family history.    Physical Exam:     Vitals:   Blood Pressure: 115/56 (08/28/24 0043)  Pulse: 65 (08/28/24 0043)  Temperature: 98.3 °F (36.8 °C) (08/28/24 0000)  Temp Source: Oral (08/28/24 0000)  Respirations: 18 (08/28/24 0043)  Height: 5' 8\" (172.7 cm) (08/28/24 0000)  Weight - Scale: 104 kg (229 lb 4.5 oz) (08/28/24 0000)  SpO2: 99 % (08/28/24 0000)    Physical Exam  Vitals reviewed.   Constitutional:       General: He is not in acute distress.     Appearance: He is obese. He is not ill-appearing.   HENT:      Head: Normocephalic and atraumatic.      Nose: Nose normal.      Mouth/Throat:      Mouth: Mucous membranes are dry.   Eyes:      Extraocular Movements: Extraocular movements intact.      Conjunctiva/sclera: Conjunctivae normal.      Pupils: Pupils are equal, round, and reactive to light.   Neck:      Vascular: No carotid bruit.   Cardiovascular:      Rate and Rhythm: Normal rate and regular rhythm.      Heart sounds: Normal heart sounds.   Pulmonary:      Effort: Pulmonary effort is normal.      Breath sounds: Normal breath sounds.   Abdominal:      General: Bowel sounds are normal.      Palpations: Abdomen is soft.      Tenderness: There is no abdominal tenderness. There is no right CVA tenderness or left CVA tenderness.   Musculoskeletal:         General: Signs of injury present.      Right lower leg: No edema.      Left lower leg: No edema.   Skin:     General: Skin is warm and dry.      Comments: " Multiple scabbed abrasions noted to the anterior lower leg and foot, Ace bandage wrapped around left thigh due to ecchymosis and leg pain from muscular strain   Neurological:      Mental Status: He is alert and oriented to person, place, and time.      Cranial Nerves: No cranial nerve deficit.      Sensory: Sensory deficit (chronic deficit in B/L feet due to diabetic peripheral neropathy) present.      Coordination: Coordination normal.         Additional Data:     Lab Results:  Results from last 7 days   Lab Units 08/27/24  1522   WBC Thousand/uL 15.14*   HEMOGLOBIN g/dL 12.8   HEMATOCRIT % 39.2   PLATELETS Thousands/uL 265   SEGS PCT % 71   LYMPHO PCT % 19   MONO PCT % 7   EOS PCT % 2     Results from last 7 days   Lab Units 08/27/24  1522   SODIUM mmol/L 135   POTASSIUM mmol/L 4.2   CHLORIDE mmol/L 101   CO2 mmol/L 23   BUN mg/dL 26*   CREATININE mg/dL 1.29   ANION GAP mmol/L 11   CALCIUM mg/dL 8.9   ALBUMIN g/dL 3.3*   TOTAL BILIRUBIN mg/dL 0.76   ALK PHOS U/L 80   ALT U/L 26   AST U/L 63*   GLUCOSE RANDOM mg/dL 174*     Results from last 7 days   Lab Units 08/27/24  1522   INR  1.23*     Results from last 7 days   Lab Units 08/28/24  0035 08/27/24  1511   POC GLUCOSE mg/dl 123 172*     Lab Results   Component Value Date    HGBA1C 6.8 (H) 09/29/2022    HGBA1C 6.7 (H) 06/01/2022     Results from last 7 days   Lab Units 08/27/24  1955 08/27/24  1522   LACTIC ACID mmol/L 1.5  --    PROCALCITONIN ng/ml  --  0.07       Lines/Drains:  Invasive Devices       Peripheral Intravenous Line  Duration             Peripheral IV 08/27/24 Left;Ventral (anterior) Forearm <1 day                        Imaging: Reviewed radiology reports from this admission including: chest xray, CT head, and MRI spine  CT head without contrast   Final Result by Jasbir Infante MD (08/27 2237)      No acute intracranial abnormality.      Mild to moderate chronic small vessel ischemic changes.            Workstation performed: [id number]          XR chest pa & lateral   ED Interpretation by Yasmin Eckert PA-C (08/27 1959)   NAD      MRI lumbar spine w wo contrast   Final Result by Napoleon Ibarra MD (08/27 1846)      Chronic disc and facet degenerative change at the lumbar spine resulting in nerve root encroachment.         Workstation performed: [id number]         XR knee 4+ views LEFT   ED Interpretation by Yasmin Eckert PA-C (08/27 1700)   NAD      XR tibia fibula 2 views LEFT   ED Interpretation by Yasmin Eckert PA-C (08/27 1700)   NAD          EKG and Other Studies Reviewed on Admission:   EKG: NSR. HR 90, first-degree AV block, similar to prior.    ** Please Note: This note has been constructed using a voice recognition system. **

## 2024-08-28 NOTE — ASSESSMENT & PLAN NOTE
Lab Results   Component Value Date    HGBA1C 6.8 (H) 09/29/2022       Recent Labs     08/27/24  1511   POCGLU 172*       Blood Sugar Average: Last 72 hrs:  (P) 172  Home regimen of empagliflozin-linagliptin, Lantus 28 units HS, metformin 1000mg BID   Will continue Lantus HS and place on sliding scale insulin w/ meals   Hypoglycemia protocol   Repeat Hgb A1c   Carb controled diet

## 2024-08-28 NOTE — ASSESSMENT & PLAN NOTE
Presenting for daily mechanical falls at home over the past 3 days, pt on eliquis. Pt slipped on rug, lost balance while getting dressed, fell down 4 stairs today. Denies head strike. Associated dizziness, left leg pain, back pain that is chronic. Pt had episode of near syncope due to dizziness while pivoting at MRI, found to be hypotensive. Responded well to 2L IVF.   Pt not evaluated by trauma at the ED providers discretion.  No head strike, no prodromal chest pain, SOB, diaphoresis.  Multiple falls mechanical in nature (slipping on rug, losing balance while changing/ walking down stairs) suspect falls are multifactorial in etiology with h/o hypotension, dizziness, ambulatory dysfunction 2/2 left leg injury and chronic low back pain.   CT head negative for acute intracranial abnormality, mild to moderate chronic small vessel ischemic changes  No new neurological deficits noted on physical exam, patient does have diabetic peripheral neuropathy and is decreased sensation to bilateral feet    Plan  Check orthostatic vital signs  Continue IV fluids  Monitor on tele  PT/OT eval and treat   Fall precautions  Monitor on tele x 24hr

## 2024-08-28 NOTE — ASSESSMENT & PLAN NOTE
S/p CABG x 3 and PCI in 2022   Follows with [facility] cardiology    Initial trop 4   EKG unchanged from prior with NSR and known 1st degree AV block

## 2024-08-28 NOTE — ASSESSMENT & PLAN NOTE
Lab Results   Component Value Date    HGBA1C 6.1 (H) 08/27/2024       Recent Labs     08/27/24  1511 08/28/24  0035 08/28/24  0744   POCGLU 172* 123 102         Blood Sugar Average: Last 72 hrs:  (P) 132.4029574077369192  Home regimen of empagliflozin-linagliptin, Lantus 28 units HS, metformin 1000mg BID   Will continue Lantus HS and place on sliding scale insulin w/ meals   Hypoglycemia protocol   Repeat Hgb A1c   Carb controled diet

## 2024-08-28 NOTE — PROGRESS NOTES
[facility]  Progress Note  Name: Pete Wetzel I  MRN: [id number]  Unit/Bed#: -01 I Date of Admission: 8/27/2024   Date of Service: 8/28/2024 I Hospital Day: 1    Assessment & Plan   * SIRS (systemic inflammatory response syndrome) (HCC)  Assessment & Plan  SIRS criteria met is tachycardia and leukocytosis, he was hypotensive to the 70s systolic as well  Unclear source of infection.  Chest x-ray with no disease, urine with only minimal pyuria  CXR no findings of acute cardiopulmonary disease, pending official read  Initial lactic 1.5.  Received 30 cc/kg fluid for hypotension  Pro-Vitaliy of 0.07  Blood cultures x 2 pending  Consider possible bacteremia as he has several wounds from his numerous falls over the last several days    Plan  Empirically treat with ceftriaxone 2 g daily  Follow-up blood cultures, if negative at 48 hours can consider stopping antibiotics      Falls  Assessment & Plan  Presenting for daily mechanical falls at home over the past 3 days, pt on eliquis. Pt slipped on rug, lost balance while getting dressed, fell down 4 stairs today. Denies head strike. Associated dizziness, left leg pain, back pain that is chronic. Pt had episode of near syncope due to dizziness while pivoting at MRI, found to be hypotensive. Responded well to 2L IVF.   Pt not evaluated by trauma at the ED providers discretion.  No head strike, no prodromal chest pain, SOB, diaphoresis.  Multiple falls mechanical in nature (slipping on rug, losing balance while changing/ walking down stairs) suspect falls are multifactorial in etiology with h/o hypotension, dizziness, ambulatory dysfunction 2/2 left leg injury and chronic low back pain.   CT head negative for acute intracranial abnormality, mild to moderate chronic small vessel ischemic changes  No new neurological deficits noted on physical exam, patient does have diabetic peripheral neuropathy and is decreased sensation to bilateral  "feet    Plan  Check orthostatic vital signs  Continue IV fluids  Monitor on tele  PT/OT eval and treat   Fall precautions  Monitor on tele x 24hr    Left leg pain  Assessment & Plan  Presenting with left posterior, constant \"muscle straining\" pain that extends from the thigh down to the calf.  Patient obtained this injury after slipping on the rug 3 days ago.  X-ray of left tibia and left knee unremarkable for traumatic injury  MRI lumbar spine noted with chronic degenerative changes/disc herniation  Continue supportive measures with Ace bandages, ice packs, as needed Tylenol  PT/OT eval and treat    History of coronary artery disease  Assessment & Plan  S/p CABG x 3 and PCI in 2022   Follows with [facility] cardiology    Initial trop 4   EKG unchanged from prior with NSR and known 1st degree AV block     Type 2 diabetes mellitus (HCC)  Assessment & Plan  Lab Results   Component Value Date    HGBA1C 6.1 (H) 08/27/2024       Recent Labs     08/27/24  1511 08/28/24  0035 08/28/24  0744   POCGLU 172* 123 102         Blood Sugar Average: Last 72 hrs:  (P) [id number]  Home regimen of empagliflozin-linagliptin, Lantus 28 units HS, metformin 1000mg BID   Will continue Lantus HS and place on sliding scale insulin w/ meals   Hypoglycemia protocol   Repeat Hgb A1c   Carb controled diet    Hypothyroidism  Assessment & Plan  Continue levothyroxine 88mcg daily    Atrial fibrillation (HCC)  Assessment & Plan  Rate controled on metoprolol, anticoagulated on eliquis   EKG shows NSR with 1st degree AV block similar to prior     Hyperlipidemia  Assessment & Plan  Continue lipitor and zetia     Hypertension  Assessment & Plan  Pt initially hypotensive on presentation, responded well to IVF   Will continue home regimen of chlorthalidone, lisinopril and metoprolol with hold parameters   Continue to monitor BP and avoid hypotension            VTE Pharmacologic Prophylaxis: VTE Score: 5 High Risk (Score >/= 5) - Pharmacological DVT " Prophylaxis Ordered: apixaban (Eliquis). Sequential Compression Devices Ordered.    Mobility:      JH-HLM Goal NOT achieved. Continue with multidisciplinary rounding and encourage appropriate mobility to improve upon JH-HLM goals.    Patient Centered Rounds: I performed bedside rounds with nursing staff today.  Discussions with Specialists or Other Care Team Provider:     Education and Discussions with Family / Patient: Patient declined call to .     Current Length of Stay: 1 day(s)  Current Patient Status: Inpatient   Discharge Plan: Anticipate discharge in 48-72 hrs to discharge location to be determined pending rehab evaluations.    Code Status: Level 1 - Full Code    Subjective:   Patient resting comfortably in bed.  He has no complaints today.  He denies any cough, fever, chills, shortness of breath, N/V/D, dysuria or trouble urinating.    Objective:     Vitals:   Temp (24hrs), Av.2 °F (36.8 °C), Min:97.9 °F (36.6 °C), Max:98.4 °F (36.9 °C)    Temp:  [97.9 °F (36.6 °C)-98.4 °F (36.9 °C)] 97.9 °F (36.6 °C)  HR:  [] 67  Resp:  [16-18] 18  BP: ()/(36-97) 126/81  SpO2:  [92 %-100 %] 98 %  Body mass index is 34.86 kg/m².     Input and Output Summary (last 24 hours):     Intake/Output Summary (Last 24 hours) at 2024 0835  Last data filed at 2024 0601  Gross per 24 hour   Intake 1602 ml   Output 550 ml   Net 1052 ml       Physical Exam:   Physical Exam  Constitutional:       General: He is not in acute distress.     Appearance: He is obese. He is not ill-appearing.   HENT:      Mouth/Throat:      Mouth: Mucous membranes are moist.      Pharynx: Oropharynx is clear.   Cardiovascular:      Rate and Rhythm: Normal rate and regular rhythm.      Heart sounds: No murmur heard.  Pulmonary:      Effort: Pulmonary effort is normal. No respiratory distress.      Breath sounds: No wheezing or rales.   Abdominal:      General: Abdomen is flat. There is no distension.      Palpations:  Abdomen is soft.      Tenderness: There is no abdominal tenderness.   Musculoskeletal:      Right lower leg: No edema.      Left lower leg: No edema.   Skin:     Comments: Scattered leg wounds, right elbow wound.  No wounds appear infected.   Neurological:      Mental Status: He is alert. Mental status is at baseline.   Psychiatric:         Mood and Affect: Mood normal.          Additional Data:     Labs:  Results from last 7 days   Lab Units 08/28/24  0515   WBC Thousand/uL 11.34*   HEMOGLOBIN g/dL 10.7*   HEMATOCRIT % 32.9*   PLATELETS Thousands/uL 189   SEGS PCT % 58   LYMPHO PCT % 28   MONO PCT % 9   EOS PCT % 3     Results from last 7 days   Lab Units 08/28/24  0515   SODIUM mmol/L 139   POTASSIUM mmol/L 4.1   CHLORIDE mmol/L 108   CO2 mmol/L 26   BUN mg/dL 24   CREATININE mg/dL 1.15   ANION GAP mmol/L 5   CALCIUM mg/dL 8.0*   ALBUMIN g/dL 2.8*   TOTAL BILIRUBIN mg/dL 0.60   ALK PHOS U/L 66   ALT U/L 22   AST U/L 44*   GLUCOSE RANDOM mg/dL 120     Results from last 7 days   Lab Units 08/27/24  1522   INR  1.23*     Results from last 7 days   Lab Units 08/28/24  0744 08/28/24  0035 08/27/24  1511   POC GLUCOSE mg/dl 102 123 172*     Results from last 7 days   Lab Units 08/27/24  1522   HEMOGLOBIN A1C % 6.1*     Results from last 7 days   Lab Units 08/27/24  1955 08/27/24  1522   LACTIC ACID mmol/L 1.5  --    PROCALCITONIN ng/ml  --  0.07       Lines/Drains:  Invasive Devices       Peripheral Intravenous Line  Duration             Peripheral IV 08/27/24 Left;Ventral (anterior) Forearm <1 day                      Telemetry:  Telemetry Orders (From admission, onward)               24 Hour Telemetry Monitoring  (ED Bridging Orders Panel)  Continuous x 24 Hours (Telem)        Question:  Reason for 24 Hour Telemetry  Answer:  Syncope suspected to be cardiac in origin                                Imaging: No pertinent imaging reviewed.    Recent Cultures (last 7 days):   Results from last 7 days   Lab Units  08/27/24 1955   BLOOD CULTURE  Received in Microbiology Lab. Culture in Progress.  Received in Microbiology Lab. Culture in Progress.       Last 24 Hours Medication List:   Current Facility-Administered Medications   Medication Dose Route Frequency Provider Last Rate    acetaminophen  650 mg Oral Q6H PRN Shreya Elizabeth PA-C      apixaban  5 mg Oral BID Shreya Elizabeth PA-C      atorvastatin  80 mg Oral HS Shreya Elizabeth PA-C      cefTRIAXone  2,000 mg Intravenous Q24H Shreya Elizabeth PA-C      chlorthalidone  12.5 mg Oral HS Shreya Elizabeth PA-C      clonazePAM  0.5 mg Oral Daily Shreya Elizabeth PA-C      clonazePAM  1 mg Oral HS Shreya Elizabeth PA-C      clopidogrel  75 mg Oral Daily Shreya Elizabeth PA-C      ezetimibe  5 mg Oral Daily Shreya Elizabeth PA-C      insulin glargine  28 Units Subcutaneous HS Shreya Elizabeth PA-C      insulin lispro  1-5 Units Subcutaneous HS Shreya Elizabeth PA-C      insulin lispro  1-6 Units Subcutaneous TID AC Shreya Elizabeth PA-C      isosorbide dinitrate  20 mg Oral TID Shreya Elizabeth PA-C      levothyroxine  88 mcg Oral Early Morning Shreya Elizabeth PA-C      lisinopril  40 mg Oral After Breakfast Shreya Elizabeth PA-C      metoprolol tartrate  50 mg Oral Q12H [facility] Shreya Elizabeth PA-C      pregabalin  100 mg Oral BID Shreya Elizabeth PA-C      ranolazine  500 mg Oral Q12H [facility] Shreya Elizabeth PA-C          Today, Patient Was Seen By: Rohit Holloway DO    **Please Note: This note may have been constructed using a voice recognition system.**

## 2024-08-28 NOTE — PLAN OF CARE
Problem: Potential for Falls  Goal: Patient will remain free of falls  Description: INTERVENTIONS:  - Educate patient/family on patient safety including physical limitations  - Instruct patient to call for assistance with activity   - Consult OT/PT to assist with strengthening/mobility   - Keep Call bell within reach  - Keep bed low and locked with side rails adjusted as appropriate  - Keep care items and personal belongings within reach  - Initiate and maintain comfort rounds  - Make Fall Risk Sign visible to staff  - Offer Toileting every 2 Hours, in advance of need  - Initiate/Maintain bed/chair alarm  - Obtain necessary fall risk management equipment  - Apply yellow socks and bracelet for high fall risk patients  - Consider moving patient to room near nurses station  Outcome: Progressing     Problem: PAIN - ADULT  Goal: Verbalizes/displays adequate comfort level or baseline comfort level  Description: Interventions:  - Encourage patient to monitor pain and request assistance  - Assess pain using appropriate pain scale  - Administer analgesics based on type and severity of pain and evaluate response  - Implement non-pharmacological measures as appropriate and evaluate response  - Consider cultural and social influences on pain and pain management  - Notify physician/advanced practitioner if interventions unsuccessful or patient reports new pain  Outcome: Progressing     Problem: INFECTION - ADULT  Goal: Absence or prevention of progression during hospitalization  Description: INTERVENTIONS:  - Assess and monitor for signs and symptoms of infection  - Monitor lab/diagnostic results  - Monitor all insertion sites, i.e. indwelling lines, tubes, and drains  - Monitor endotracheal if appropriate and nasal secretions for changes in amount and color  - [place] appropriate cooling/warming therapies per order  - Administer medications as ordered  - Instruct and encourage patient and family to use good hand hygiene  technique  - Identify and instruct in appropriate isolation precautions for identified infection/condition  Outcome: Progressing  Goal: Absence of fever/infection during neutropenic period  Description: INTERVENTIONS:  - Monitor WBC    Outcome: Progressing     Problem: SAFETY ADULT  Goal: Patient will remain free of falls  Description: INTERVENTIONS:  - Educate patient/family on patient safety including physical limitations  - Instruct patient to call for assistance with activity   - Consult OT/PT to assist with strengthening/mobility   - Keep Call bell within reach  - Keep bed low and locked with side rails adjusted as appropriate  - Keep care items and personal belongings within reach  - Initiate and maintain comfort rounds  - Make Fall Risk Sign visible to staff  - Offer Toileting every 2 Hours, in advance of need  - Initiate/Maintain bed/chair alarm  - Obtain necessary fall risk management equipment  - Apply yellow socks and bracelet for high fall risk patients  - Consider moving patient to room near nurses station  Outcome: Progressing  Goal: Maintain or return to baseline ADL function  Description: INTERVENTIONS:  -  Assess patient's ability to carry out ADLs; assess patient's baseline for ADL function and identify physical deficits which impact ability to perform ADLs (bathing, care of mouth/teeth, toileting, grooming, dressing, etc.)  - Assess/evaluate cause of self-care deficits   - Assess range of motion  - Assess patient's mobility; develop plan if impaired  - Assess patient's need for assistive devices and provide as appropriate  - Encourage maximum independence but intervene and supervise when necessary  - Involve family in performance of ADLs  - Assess for home care needs following discharge   - Consider OT consult to assist with ADL evaluation and planning for discharge  - Provide patient education as appropriate  Outcome: Progressing  Goal: Maintains/Returns to pre admission functional  level  Description: INTERVENTIONS:  - Perform AM-PAC 6 Click Basic Mobility/ Daily Activity assessment daily.  - Set and communicate daily mobility goal to care team and patient/family/caregiver.   - Collaborate with rehabilitation services on mobility goals if consulted  - Perform Range of Motion 3 times a day.  - Reposition patient every 2 hours.  - Dangle patient 3 times a day  - Stand patient 3 times a day  - Ambulate patient 3 times a day  - Out of bed to chair 3 times a day   - Out of bed for meals 3 times a day  - Out of bed for toileting  - Record patient progress and toleration of activity level   Outcome: Progressing     Problem: Prexisting or High Potential for Compromised Skin Integrity  Goal: Skin integrity is maintained or improved  Description: INTERVENTIONS:  - Identify patients at risk for skin breakdown  - Assess and monitor skin integrity  - Assess and monitor nutrition and hydration status  - Monitor labs   - Assess for incontinence   - Turn and reposition patient  - Assist with mobility/ambulation  - Relieve pressure over bony prominences  - Avoid friction and shearing  - Provide appropriate hygiene as needed including keeping skin clean and dry  - Evaluate need for skin moisturizer/barrier cream  - Collaborate with interdisciplinary team   - Patient/family teaching  - Consider wound care consult   Outcome: Progressing

## 2024-08-28 NOTE — PLAN OF CARE
Problem: Potential for Falls  Goal: Patient will remain free of falls  Description: INTERVENTIONS:  - Educate patient/family on patient safety including physical limitations  - Instruct patient to call for assistance with activity   - Consult OT/PT to assist with strengthening/mobility   - Keep Call bell within reach  - Keep bed low and locked with side rails adjusted as appropriate  - Keep care items and personal belongings within reach  - Initiate and maintain comfort rounds  - Make Fall Risk Sign visible to staff  - Apply yellow socks and bracelet for high fall risk patients  - Consider moving patient to room near nurses station  Outcome: Progressing     Problem: PAIN - ADULT  Goal: Verbalizes/displays adequate comfort level or baseline comfort level  Description: Interventions:  - Encourage patient to monitor pain and request assistance  - Assess pain using appropriate pain scale  - Administer analgesics based on type and severity of pain and evaluate response  - Implement non-pharmacological measures as appropriate and evaluate response  - Consider cultural and social influences on pain and pain management  - Notify physician/advanced practitioner if interventions unsuccessful or patient reports new pain  Outcome: Progressing     Problem: INFECTION - ADULT  Goal: Absence or prevention of progression during hospitalization  Description: INTERVENTIONS:  - Assess and monitor for signs and symptoms of infection  - Monitor lab/diagnostic results  - Monitor all insertion sites, i.e. indwelling lines, tubes, and drains  - Monitor endotracheal if appropriate and nasal secretions for changes in amount and color  - [place] appropriate cooling/warming therapies per order  - Administer medications as ordered  - Instruct and encourage patient and family to use good hand hygiene technique  - Identify and instruct in appropriate isolation precautions for identified infection/condition  Outcome: Progressing  Goal: Absence  of fever/infection during neutropenic period  Description: INTERVENTIONS:  - Monitor WBC    Outcome: Progressing     Problem: SAFETY ADULT  Goal: Patient will remain free of falls  Description: INTERVENTIONS:  - Educate patient/family on patient safety including physical limitations  - Instruct patient to call for assistance with activity   - Consult OT/PT to assist with strengthening/mobility   - Keep Call bell within reach  - Keep bed low and locked with side rails adjusted as appropriate  - Keep care items and personal belongings within reach  - Initiate and maintain comfort rounds  - Make Fall Risk Sign visible to staff  - Apply yellow socks and bracelet for high fall risk patients  - Consider moving patient to room near nurses station  Outcome: Progressing  Goal: Maintain or return to baseline ADL function  Description: INTERVENTIONS:  -  Assess patient's ability to carry out ADLs; assess patient's baseline for ADL function and identify physical deficits which impact ability to perform ADLs (bathing, care of mouth/teeth, toileting, grooming, dressing, etc.)  - Assess/evaluate cause of self-care deficits   - Assess range of motion  - Assess patient's mobility; develop plan if impaired  - Assess patient's need for assistive devices and provide as appropriate  - Encourage maximum independence but intervene and supervise when necessary  - Involve family in performance of ADLs  - Assess for home care needs following discharge   - Consider OT consult to assist with ADL evaluation and planning for discharge  - Provide patient education as appropriate  Outcome: Progressing  Goal: Maintains/Returns to pre admission functional level  Description: INTERVENTIONS:  - Perform AM-PAC 6 Click Basic Mobility/ Daily Activity assessment daily.  - Set and communicate daily mobility goal to care team and patient/family/caregiver.   - Collaborate with rehabilitation services on mobility goals if consulted  - Record patient progress  and toleration of activity level   Outcome: Progressing

## 2024-08-28 NOTE — ASSESSMENT & PLAN NOTE
SIRS criteria met is tachycardia and leukocytosis, he was hypotensive to the 70s systolic as well  Unclear source of infection.  Chest x-ray with no disease, urine with only minimal pyuria  CXR no findings of acute cardiopulmonary disease, pending official read  Initial lactic 1.5.  Received 30 cc/kg fluid for hypotension  Pro-Vitaliy of 0.07  Blood cultures x 2 pending  Consider possible bacteremia as he has several wounds from his numerous falls over the last several days    Plan  Empirically treat with ceftriaxone 2 g daily  Follow-up blood cultures, if negative at 48 hours can consider stopping antibiotics

## 2024-08-28 NOTE — ASSESSMENT & PLAN NOTE
Pt initially hypotensive on presentation, responded well to IVF   Will continue home regimen of chlorthalidone, lisinopril and metoprolol with hold parameters   Continue to monitor BP and avoid hypotension

## 2024-08-28 NOTE — SEPSIS NOTE
"  Sepsis Note   Pete MENDOZA Damari 74 y.o. male MRN: [id number]  Unit/Bed#: ED-03 Encounter: [id number]       Initial Sepsis Screening       Row Name 08/27/24 1900                Is the patient's history suggestive of a new or worsening infection? Yes (Proceed)  -SF        Suspected source of infection urinary tract infection  -SF        Indicate SIRS criteria Leukocytosis (WBC > 75099 IJL) OR Leukopenia (WBC <4000 IJL) OR Bandemia (WBC >10% bands);Tachycardia > 90 bpm  -SF        Are two or more of the above signs & symptoms of infection both present and new to the patient? Yes (Proceed)  -SF        Assess for evidence of organ dysfunction: Are any of the below criteria present within 6 hours of suspected infection and SIRS criteria that are NOT considered to be chronic conditions? SBP < 90  -SF        Date of presentation of septic shock 08/27/24  -SF        Time of presentation of septic shock 1900  -SF        Fluid Resuscitation: 30 ml/kg IV fluid bolus will be given based on ideal body weight (use if BMI >30)  -SF        Is the patient is persistently hypotensive in the hour after fluid bolus administration? If yes, patient meets criteria for vasopressor use. NO  -SF        Sepsis Note: Click \"NEXT\" below (NOT \"close\") to generate sepsis note based on above information. YES (proceed by clicking \"NEXT\")  -SF                  User Key  (r) = Recorded By, (t) = Taken By, (c) = Cosigned By      Initials Name Provider Type    SF Yasmin Eckert PA-C Physician Assistant                        There is no height or weight on file to calculate BMI.  Wt Readings from Last 1 Encounters:   08/27/24 104 kg (229 lb)     IBW (Ideal Body Weight): 68.4 kg    Ideal body weight: 68.4 kg (150 lb 12.7 oz)  Adjusted ideal body weight: 82.6 kg (182 lb 1.2 oz)    "

## 2024-08-28 NOTE — ASSESSMENT & PLAN NOTE
Rate controled on metoprolol, anticoagulated on eliquis   EKG shows NSR with 1st degree AV block similar to prior

## 2024-08-28 NOTE — ASSESSMENT & PLAN NOTE
"Presenting with left posterior, constant \"muscle straining\" pain that extends from the thigh down to the calf.  Patient obtained this injury after slipping on the rug 3 days ago.  X-ray of left tibia and left knee unremarkable for traumatic injury  MRI lumbar spine noted with chronic degenerative changes/disc herniation  Continue supportive measures with Ace bandages, ice packs, as needed Tylenol  PT/OT eval and treat  "

## 2024-08-29 LAB
ALBUMIN SERPL BCG-MCNC: 2.9 G/DL (ref 3.5–5)
ALP SERPL-CCNC: 65 U/L (ref 34–104)
ALT SERPL W P-5'-P-CCNC: 21 U/L (ref 7–52)
ANION GAP SERPL CALCULATED.3IONS-SCNC: 6 MMOL/L (ref 4–13)
AST SERPL W P-5'-P-CCNC: 41 U/L (ref 13–39)
BASOPHILS # BLD AUTO: 0.05 THOUSANDS/ÂΜL (ref 0–0.1)
BASOPHILS NFR BLD AUTO: 1 % (ref 0–1)
BILIRUB SERPL-MCNC: 0.83 MG/DL (ref 0.2–1)
BUN SERPL-MCNC: 20 MG/DL (ref 5–25)
CALCIUM ALBUM COR SERPL-MCNC: 8.9 MG/DL (ref 8.3–10.1)
CALCIUM SERPL-MCNC: 8 MG/DL (ref 8.4–10.2)
CHLORIDE SERPL-SCNC: 106 MMOL/L (ref 96–108)
CO2 SERPL-SCNC: 26 MMOL/L (ref 21–32)
CREAT SERPL-MCNC: 1 MG/DL (ref 0.6–1.3)
EOSINOPHIL # BLD AUTO: 0.25 THOUSAND/ÂΜL (ref 0–0.61)
EOSINOPHIL NFR BLD AUTO: 3 % (ref 0–6)
ERYTHROCYTE [DISTWIDTH] IN BLOOD BY AUTOMATED COUNT: 14.8 % (ref 11.6–15.1)
GFR SERPL CREATININE-BSD FRML MDRD: 73 ML/MIN/1.73SQ M
GLUCOSE SERPL-MCNC: 106 MG/DL (ref 65–140)
GLUCOSE SERPL-MCNC: 115 MG/DL (ref 65–140)
GLUCOSE SERPL-MCNC: 129 MG/DL (ref 65–140)
GLUCOSE SERPL-MCNC: 129 MG/DL (ref 65–140)
GLUCOSE SERPL-MCNC: 133 MG/DL (ref 65–140)
GLUCOSE SERPL-MCNC: 143 MG/DL (ref 65–140)
HCT VFR BLD AUTO: 29.5 % (ref 36.5–49.3)
HGB BLD-MCNC: 9.8 G/DL (ref 12–17)
IMM GRANULOCYTES # BLD AUTO: 0.06 THOUSAND/UL (ref 0–0.2)
IMM GRANULOCYTES NFR BLD AUTO: 1 % (ref 0–2)
LYMPHOCYTES # BLD AUTO: 2.32 THOUSANDS/ÂΜL (ref 0.6–4.47)
LYMPHOCYTES NFR BLD AUTO: 23 % (ref 14–44)
MCH RBC QN AUTO: 30.2 PG (ref 26.8–34.3)
MCHC RBC AUTO-ENTMCNC: 33.2 G/DL (ref 31.4–37.4)
MCV RBC AUTO: 91 FL (ref 82–98)
MONOCYTES # BLD AUTO: 1.1 THOUSAND/ÂΜL (ref 0.17–1.22)
MONOCYTES NFR BLD AUTO: 11 % (ref 4–12)
NEUTROPHILS # BLD AUTO: 6.16 THOUSANDS/ÂΜL (ref 1.85–7.62)
NEUTS SEG NFR BLD AUTO: 61 % (ref 43–75)
NRBC BLD AUTO-RTO: 0 /100 WBCS
PLATELET # BLD AUTO: 189 THOUSANDS/UL (ref 149–390)
PMV BLD AUTO: 12.1 FL (ref 8.9–12.7)
POTASSIUM SERPL-SCNC: 3.6 MMOL/L (ref 3.5–5.3)
PROT SERPL-MCNC: 5.2 G/DL (ref 6.4–8.4)
RBC # BLD AUTO: 3.25 MILLION/UL (ref 3.88–5.62)
SODIUM SERPL-SCNC: 138 MMOL/L (ref 135–147)
TSH SERPL DL<=0.05 MIU/L-ACNC: 1.03 UIU/ML (ref 0.45–4.5)
WBC # BLD AUTO: 9.94 THOUSAND/UL (ref 4.31–10.16)

## 2024-08-29 PROCEDURE — 97116 GAIT TRAINING THERAPY: CPT

## 2024-08-29 PROCEDURE — 82948 REAGENT STRIP/BLOOD GLUCOSE: CPT

## 2024-08-29 PROCEDURE — 84443 ASSAY THYROID STIM HORMONE: CPT

## 2024-08-29 PROCEDURE — 97163 PT EVAL HIGH COMPLEX 45 MIN: CPT

## 2024-08-29 PROCEDURE — 99232 SBSQ HOSP IP/OBS MODERATE 35: CPT | Performed by: INTERNAL MEDICINE

## 2024-08-29 PROCEDURE — 99223 1ST HOSP IP/OBS HIGH 75: CPT | Performed by: INTERNAL MEDICINE

## 2024-08-29 PROCEDURE — 85025 COMPLETE CBC W/AUTO DIFF WBC: CPT | Performed by: INTERNAL MEDICINE

## 2024-08-29 PROCEDURE — 80053 COMPREHEN METABOLIC PANEL: CPT | Performed by: INTERNAL MEDICINE

## 2024-08-29 RX ORDER — LISINOPRIL 20 MG/1
20 TABLET ORAL DAILY
Status: DISCONTINUED | OUTPATIENT
Start: 2024-08-29 | End: 2024-08-30

## 2024-08-29 RX ORDER — ISOSORBIDE DINITRATE 10 MG/1
10 TABLET ORAL 3 TIMES DAILY
Status: DISCONTINUED | OUTPATIENT
Start: 2024-08-29 | End: 2024-08-30

## 2024-08-29 RX ORDER — LISINOPRIL 10 MG/1
10 TABLET ORAL DAILY
Status: DISCONTINUED | OUTPATIENT
Start: 2024-08-29 | End: 2024-08-29

## 2024-08-29 RX ADMIN — RANOLAZINE 500 MG: 500 TABLET, FILM COATED, EXTENDED RELEASE ORAL at 22:03

## 2024-08-29 RX ADMIN — LEVOTHYROXINE SODIUM 88 MCG: 88 TABLET ORAL at 05:45

## 2024-08-29 RX ADMIN — CLONAZEPAM 1 MG: 1 TABLET ORAL at 22:03

## 2024-08-29 RX ADMIN — ISOSORBIDE DINITRATE 10 MG: 10 TABLET ORAL at 17:02

## 2024-08-29 RX ADMIN — SENNOSIDES AND DOCUSATE SODIUM 1 TABLET: 8.6; 5 TABLET ORAL at 22:03

## 2024-08-29 RX ADMIN — APIXABAN 5 MG: 5 TABLET, FILM COATED ORAL at 17:02

## 2024-08-29 RX ADMIN — EZETIMIBE 5 MG: 10 TABLET ORAL at 08:15

## 2024-08-29 RX ADMIN — CLOPIDOGREL BISULFATE 75 MG: 75 TABLET ORAL at 08:15

## 2024-08-29 RX ADMIN — INSULIN GLARGINE 28 UNITS: 100 INJECTION, SOLUTION SUBCUTANEOUS at 22:11

## 2024-08-29 RX ADMIN — RANOLAZINE 500 MG: 500 TABLET, FILM COATED, EXTENDED RELEASE ORAL at 08:15

## 2024-08-29 RX ADMIN — PREGABALIN 100 MG: 100 CAPSULE ORAL at 17:02

## 2024-08-29 RX ADMIN — ATORVASTATIN CALCIUM 80 MG: 40 TABLET, FILM COATED ORAL at 22:03

## 2024-08-29 RX ADMIN — APIXABAN 5 MG: 5 TABLET, FILM COATED ORAL at 08:15

## 2024-08-29 RX ADMIN — CLONAZEPAM 0.5 MG: 0.5 TABLET ORAL at 08:15

## 2024-08-29 RX ADMIN — PREGABALIN 100 MG: 100 CAPSULE ORAL at 08:15

## 2024-08-29 NOTE — ASSESSMENT & PLAN NOTE
"Presenting with left posterior, constant \"muscle straining\" pain that extends from the thigh down to the calf.  Patient obtained this injury after slipping on the rug 3 days ago.  X-ray of left tibia and left knee unremarkable for traumatic injury  MRI lumbar spine noted with chronic degenerative changes/disc herniation  Continue supportive measures with Ace bandages, ice packs, as needed Tylenol  PT evaluation completed recommend level III  OT evaluation pending  "

## 2024-08-29 NOTE — CASE MANAGEMENT
Case Management Assessment    Patient name Pete MENDOZA Damari  Location S /S -01 MRN [id number]  : 1950 Date 2024       Current Admission Date: 2024  Current Admission Diagnosis:SIRS (systemic inflammatory response syndrome) (HCC)   Patient Active Problem List    Diagnosis Date Noted Date Diagnosed    Falls 2024     SIRS (systemic inflammatory response syndrome) (HCC) 2024     Left leg pain 2024     History of coronary artery disease 10/02/2022     Sinus bradycardia 10/02/2022     Hypothyroidism 2022     Type 2 diabetes mellitus (HCC) 2022     Hypertension      Heart attack (HCC)      S/P triple vessel bypass      Hyperlipidemia      Atrial fibrillation (HCC)      Morbid obesity (HCC)      Supraspinatus tendon tear, right, initial encounter 2019     Infraspinatus tendon tear, right, initial encounter 2019       LOS (days): 2  Geometric Mean LOS (GMLOS) (days):   Days to GMLOS:     OBJECTIVE:    Risk of Unplanned Readmission Score: 15.83         Current admission status: Inpatient       Preferred Pharmacy:   [id number] - [place], [place] - [address]  [address]  [place] [address]  Phone: [phone] Fax: [phone]    Primary Care Provider: sIsa Hull MD    Primary Insurance: [facility]  Secondary Insurance: MEDICARE    ASSESSMENT:  Active Health Care Proxies    There are no active Health Care Proxies on file.                      Patient Information  Admitted from:: Home  Mental Status: Alert  During Assessment patient was accompanied by: Not accompanied during assessment  Assessment information provided by:: Patient  Primary Caregiver: Self  Support Systems: Family members  County of Residence: [place]  [place]  Home entry access options. Select all that apply.: Stairs  Number of steps to enter home.: 3  Type of Current Residence: [address] [place] [address]  Upon  entering residence, is there a bedroom on the main floor (no further steps)?: No  A bedroom is located on the following floor levels of residence (select all that apply):: 2nd Floor  Upon entering residence, is there a bathroom on the main floor (no further steps)?: Yes  Number of steps to 2nd floor from main floor: One Flight  Living Arrangements: Lives w/ Spouse/significant other    Activities of Daily Living Prior to Admission  Functional Status: Independent  Completes ADLs independently?: Yes  Ambulates independently?: Yes  Does patient use assisted devices?: Yes  Assisted Devices (DME) used: Rollator, Straight Cane  Does patient currently own DME?: Yes  What DME does the patient currently own?: Rollator, Straight Cane  Does patient have a history of Outpatient Therapy (PT/OT)?: Yes ([facility] PT/OT and [facility] for aqua therapy)  Does the patient have a history of Short-Term Rehab?: No  Does patient have a history of HHC?: No  Does patient currently have HHC?: No         Patient Information Continued  Does patient have prescription coverage?: Yes  Does patient receive dialysis treatments?: No         Means of Transportation  Means of Transport to Appts:: Drives Self    CM met with patient at bedside re: assessment. Patient relayed he lives with spouse in two story home, bedroom on second level and first floor bathroom. Patient relayed he is independent with ADLS and utilizes his cane in the home and rollator when out in community. Pt relayed hx of  OP aqua therapy with [facility] and OPPT/OT with [facility]. Pt confirmed PCP Colon (pack 4) [facility] Pt relayed spouse to provide transport home when ready for d/c. No CM d/c needs currently identified, CM remains available.     Social Determinants of Health (SDOH)      Flowsheet Row Most Recent Value   Housing Stability    In the last 12 months, was there a time when you were not able to pay the mortgage or  rent on time? N   At any time in the past 12 months, were you homeless or living in a shelter (including now)? N   Transportation Needs    In the past 12 months, has lack of transportation kept you from medical appointments or from getting medications? no   In the past 12 months, has lack of transportation kept you from meetings, work, or from getting things needed for daily living? No   Food Insecurity    Within the past 12 months, you worried that your food would run out before you got the money to buy more. Never true   Within the past 12 months, the food you bought just didn't last and you didn't have money to get more. Never true   Utilities    In the past 12 months has the electric, gas, oil, or water company threatened to shut off services in your home? No

## 2024-08-29 NOTE — ASSESSMENT & PLAN NOTE
Lab Results   Component Value Date    HGBA1C 6.1 (H) 08/27/2024       Recent Labs     08/28/24  1553 08/28/24  2057 08/29/24  0804 08/29/24  1034   POCGLU 147* 143* 129 133         Blood Sugar Average: Last 72 hrs:  (P) 133.25  Home regimen of empagliflozin-linagliptin, Lantus 28 units HS, metformin 1000mg BID   Will continue Lantus HS and place on sliding scale insulin w/ meals   Hypoglycemia protocol   Repeat Hgb A1c   Carb controled diet

## 2024-08-29 NOTE — ASSESSMENT & PLAN NOTE
Pt initially hypotensive on presentation, responded well to IVF   Will adjust home medications per cardiology recommendations  Patient will follow-up with his PCP and cardiologist (already has an appointment) after discharge for further medication optimization

## 2024-08-29 NOTE — ASSESSMENT & PLAN NOTE
Lab Results   Component Value Date    HGBA1C 6.1 (H) 08/27/2024       Recent Labs     08/28/24  1553 08/28/24  2057 08/29/24  0804 08/29/24  1034   POCGLU 147* 143* 129 133         Blood Sugar Average: Last 72 hrs:  (P) 133.25  Home regimen of empagliflozin-linagliptin, Lantus 28 units HS, metformin 1000mg BID   Placed on Lantus HS and place on sliding scale insulin w/ meals while inpatient  Home regimen continued at discharge

## 2024-08-29 NOTE — ASSESSMENT & PLAN NOTE
Presenting for daily mechanical falls at home over the past 3 days, pt on eliquis. Pt slipped on rug, lost balance while getting dressed, fell down 4 stairs today. Denies head strike. Associated dizziness, left leg pain, back pain that is chronic. Pt had episode of near syncope due to dizziness while pivoting at MRI, found to be hypotensive. Responded well to 2L IVF.   Pt not evaluated by trauma at the ED providers discretion.  No head strike, no prodromal chest pain, SOB, diaphoresis.  Multiple falls mechanical in nature (slipping on rug, losing balance while changing/ walking down stairs) suspect falls are multifactorial in etiology with h/o hypotension, dizziness, ambulatory dysfunction 2/2 left leg injury and chronic low back pain.   CT head negative for acute intracranial abnormality, mild to moderate chronic small vessel ischemic changes  No new neurological deficits noted on physical exam, patient does have diabetic peripheral neuropathy and is decreased sensation to bilateral feet    Patient's orthostatic vitals are positive most likely result of multiple medications in combination     Plan  Orthostatic vital signs positive  PT evaluation complete recommend level III; stable for discharge  Fall precautions in place  Cardiology on board initial recommendations include reduction of dosage in medications with hypotensive side effect profile  Will f/u with recommendations  Continue tele monitoring   We will also consider compression stockings

## 2024-08-29 NOTE — ASSESSMENT & PLAN NOTE
Presenting for daily mechanical falls at home over the past 3 days, pt on eliquis. Pt slipped on rug, lost balance while getting dressed, fell down 4 stairs today. Denies head strike. Associated dizziness, left leg pain, back pain that is chronic. Pt had episode of near syncope due to dizziness while pivoting at MRI, found to be hypotensive. Responded well to 2L IVF.   Pt not evaluated by trauma at the ED providers discretion.  No head strike, no prodromal chest pain, SOB, diaphoresis.  Multiple falls mechanical in nature (slipping on rug, losing balance while changing/ walking down stairs) suspect falls are multifactorial in etiology with h/o hypotension, dizziness, ambulatory dysfunction 2/2 left leg injury and chronic low back pain.   CT head negative for acute intracranial abnormality, mild to moderate chronic small vessel ischemic changes  No new neurological deficits noted on physical exam, patient does have diabetic peripheral neuropathy and is decreased sensation to bilateral feet    Patient's orthostatic vitals are positive most likely result of multiple medications in combination     Plan  Orthostatic vital signs now negative on DC  PT/OT evaluation complete recommend level III; stable for discharge, home health referral placed  Cardiology consulted while inpatient.  Lisinopril dose reduced to 5 mg daily  Stopped Isordil further to 5 mg 3 times daily (Down from 20 mg TID)   Compression stockings ordered  Patient will follow-up with PCP and cardiologist (appointment already made).

## 2024-08-29 NOTE — PROGRESS NOTES
[facility]  Progress Note  Name: Pete Wetzel I  MRN: [id number]  Unit/Bed#: S -01 I Date of Admission: 8/27/2024   Date of Service: 8/29/2024 I Hospital Day: 2    Assessment & Plan   * SIRS (systemic inflammatory response syndrome) (HCC)  Assessment & Plan  SIRS criteria met is tachycardia and leukocytosis, he was hypotensive to the 70s systolic as well  Unclear source of infection.  Chest x-ray with no disease, urine with only minimal pyuria  CXR no findings of acute cardiopulmonary disease, pending official read  Initial lactic 1.5.  Received 30 cc/kg fluid for hypotension  Pro-Vitaliy of 0.07  Blood cultures x 2 pending  Consider possible bacteremia as he has several wounds from his numerous falls over the last several days    Plan  Monitor off antibiotics for now; given no source of infection, and patient's asymptomatic state  Blood cultures are negative at 24 hours, WBC down trended, remains afebrile      Falls  Assessment & Plan  Presenting for daily mechanical falls at home over the past 3 days, pt on eliquis. Pt slipped on rug, lost balance while getting dressed, fell down 4 stairs today. Denies head strike. Associated dizziness, left leg pain, back pain that is chronic. Pt had episode of near syncope due to dizziness while pivoting at MRI, found to be hypotensive. Responded well to 2L IVF.   Pt not evaluated by trauma at the ED providers discretion.  No head strike, no prodromal chest pain, SOB, diaphoresis.  Multiple falls mechanical in nature (slipping on rug, losing balance while changing/ walking down stairs) suspect falls are multifactorial in etiology with h/o hypotension, dizziness, ambulatory dysfunction 2/2 left leg injury and chronic low back pain.   CT head negative for acute intracranial abnormality, mild to moderate chronic small vessel ischemic changes  No new neurological deficits noted on physical exam, patient does have diabetic peripheral neuropathy and is  "decreased sensation to bilateral feet    Patient's orthostatic vitals are positive most likely result of multiple medications in combination     Plan  Orthostatic vital signs positive  PT evaluation complete recommend level III; stable for discharge  Fall precautions in place  Cardiology on board   Lisinopril dose restarted at 20 mg (home dose 40 mg)  Reduced Isordil to 10 mg 3 times daily (Down from 20 mg TID)  Will f/u with further recommendations  Continue tele monitoring   We will also consider compression stockings    Left leg pain  Assessment & Plan  Presenting with left posterior, constant \"muscle straining\" pain that extends from the thigh down to the calf.  Patient obtained this injury after slipping on the rug 3 days ago.  X-ray of left tibia and left knee unremarkable for traumatic injury  MRI lumbar spine noted with chronic degenerative changes/disc herniation  Continue supportive measures with Ace bandages, ice packs, as needed Tylenol  PT evaluation completed recommend level III  OT evaluation pending    History of coronary artery disease  Assessment & Plan  S/p CABG x 3 and PCI in 2022   Follows with [facility] cardiology    Initial trop 4   EKG unchanged from prior with NSR and known 1st degree AV block     Type 2 diabetes mellitus (HCC)  Assessment & Plan  Lab Results   Component Value Date    HGBA1C 6.1 (H) 08/27/2024       Recent Labs     08/28/24  1553 08/28/24  2057 08/29/24  0804 08/29/24  1034   POCGLU 147* 143* 129 133         Blood Sugar Average: Last 72 hrs:  (P) 133.25  Home regimen of empagliflozin-linagliptin, Lantus 28 units HS, metformin 1000mg BID   Will continue Lantus HS and place on sliding scale insulin w/ meals   Hypoglycemia protocol   Repeat Hgb A1c   Carb controled diet    Hypothyroidism  Assessment & Plan  Continue levothyroxine 88mcg daily    Atrial fibrillation (HCC)  Assessment & Plan  Rate controled on metoprolol, anticoagulated on eliquis   EKG shows NSR with 1st degree AV " block similar to prior     Hyperlipidemia  Assessment & Plan  Continue lipitor and zetia     Hypertension  Assessment & Plan  Pt initially hypotensive on presentation, responded well to IVF   Will adjust home medications per cardiology recommendations  Continue to monitor BP and avoid hypotension   Since responsive to IV fluids will consider bolus in incidents of hypotension         VTE Pharmacologic Prophylaxis: VTE Score: 5 High Risk (Score >/= 5) - Pharmacological DVT Prophylaxis Ordered: apixaban (Eliquis). Sequential Compression Devices Ordered.    Mobility:   Basic Mobility Inpatient Raw Score: 18  JH-HLM Goal: 6: Walk 10 steps or more  JH-HLM Achieved: 7: Walk 25 feet or more  JH-HLM Goal NOT achieved. Continue with multidisciplinary rounding and encourage appropriate mobility to improve upon JH-HLM goals.    Patient Centered Rounds: I performed bedside rounds with nursing staff today.  Discussions with Specialists or Other Care Team Provider: Cardiology    Education and Discussions with Family / Patient: Patient declined call to .     Current Length of Stay: 2 day(s)  Current Patient Status: Inpatient   Discharge Plan: Anticipate discharge in 24-48 hrs to home.    Code Status: Level 1 - Full Code    Subjective:   Patient seen resting in bed.  No acute overnight events.  Patient does endorse ambulating well yesterday using Rolator.  Patient does endorse lightheadedness/dizziness specifically when changing positions from lying down to sitting up. Counseled on the possibility of needing to wear compression stockings patient is amendable.   He endorses no new symptomology.      Objective:     Vitals:   Temp (24hrs), Av.3 °F (36.8 °C), Min:98 °F (36.7 °C), Max:98.5 °F (36.9 °C)    Temp:  [98 °F (36.7 °C)-98.5 °F (36.9 °C)] 98.5 °F (36.9 °C)  HR:  [] 110  Resp:  [17-18] 18  BP: ()/(39-59) 91/52  SpO2:  [93 %-98 %] 95 %  Body mass index is 34.86 kg/m².     Input and Output Summary  (last 24 hours):     Intake/Output Summary (Last 24 hours) at 8/29/2024 1418  Last data filed at 8/29/2024 1250  Gross per 24 hour   Intake 1160 ml   Output 825 ml   Net 335 ml       Physical Exam:   Physical Exam  Constitutional:       General: He is not in acute distress.     Appearance: He is obese. He is not ill-appearing, toxic-appearing or diaphoretic.   HENT:      Head: Normocephalic and atraumatic.      Nose: No congestion or rhinorrhea.      Mouth/Throat:      Mouth: Mucous membranes are moist.      Pharynx: No oropharyngeal exudate or posterior oropharyngeal erythema.   Eyes:      General: No scleral icterus.        Right eye: No discharge.         Left eye: No discharge.      Extraocular Movements: Extraocular movements intact.      Conjunctiva/sclera: Conjunctivae normal.   Cardiovascular:      Rate and Rhythm: Normal rate and regular rhythm.      Pulses: Normal pulses.      Heart sounds: Normal heart sounds. No murmur heard.     No friction rub. No gallop.   Pulmonary:      Effort: Pulmonary effort is normal. No respiratory distress.      Breath sounds: No stridor. No wheezing, rhonchi or rales.   Chest:      Chest wall: No tenderness.   Abdominal:      General: Abdomen is flat. Bowel sounds are normal. There is no distension.      Palpations: Abdomen is soft. There is no mass.      Tenderness: There is no abdominal tenderness. There is no right CVA tenderness, left CVA tenderness, guarding or rebound.      Hernia: No hernia is present.   Musculoskeletal:         General: Signs of injury present. No swelling, tenderness or deformity.   Skin:     General: Skin is warm.      Coloration: Skin is not jaundiced or pale.      Findings: Bruising present. No erythema, lesion or rash.   Neurological:      General: No focal deficit present.      Mental Status: He is alert and oriented to person, place, and time. Mental status is at baseline.   Psychiatric:         Mood and Affect: Mood normal.         Behavior:  Behavior normal.         Thought Content: Thought content normal.         Judgment: Judgment normal.          Additional Data:     Labs:  Results from last 7 days   Lab Units 08/29/24  0549   WBC Thousand/uL 9.94   HEMOGLOBIN g/dL 9.8*   HEMATOCRIT % 29.5*   PLATELETS Thousands/uL 189   SEGS PCT % 61   LYMPHO PCT % 23   MONO PCT % 11   EOS PCT % 3     Results from last 7 days   Lab Units 08/29/24  0549   SODIUM mmol/L 138   POTASSIUM mmol/L 3.6   CHLORIDE mmol/L 106   CO2 mmol/L 26   BUN mg/dL 20   CREATININE mg/dL 1.00   ANION GAP mmol/L 6   CALCIUM mg/dL 8.0*   ALBUMIN g/dL 2.9*   TOTAL BILIRUBIN mg/dL 0.83   ALK PHOS U/L 65   ALT U/L 21   AST U/L 41*   GLUCOSE RANDOM mg/dL 106     Results from last 7 days   Lab Units 08/27/24  1522   INR  1.23*     Results from last 7 days   Lab Units 08/29/24  1034 08/29/24  0804 08/28/24  2057 08/28/24  1553 08/28/24  1109 08/28/24  0744 08/28/24  0035 08/27/24  1511   POC GLUCOSE mg/dl 133 129 143* 147* 117 102 123 172*     Results from last 7 days   Lab Units 08/27/24  1522   HEMOGLOBIN A1C % 6.1*     Results from last 7 days   Lab Units 08/27/24 1955 08/27/24  1522   LACTIC ACID mmol/L 1.5  --    PROCALCITONIN ng/ml  --  0.07       Lines/Drains:  Invasive Devices       Peripheral Intravenous Line  Duration             Peripheral IV 08/27/24 Left;Ventral (anterior) Forearm 1 day                      Telemetry:  Telemetry Orders (From admission, onward)               24 Hour Telemetry Monitoring  (ED Bridging Orders Panel)  Continuous x 24 Hours (Telem)        Question:  Reason for 24 Hour Telemetry  Answer:  Syncope suspected to be cardiac in origin                     Telemetry Reviewed: Normal Sinus Rhythm  Indication for Continued Telemetry Use: Arrthymias requiring medical therapy             Imaging: No pertinent imaging reviewed.    Recent Cultures (last 7 days):   Results from last 7 days   Lab Units 08/27/24 1955   BLOOD CULTURE  No Growth at 24 hrs.  No Growth at  24 hrs.       Last 24 Hours Medication List:   Current Facility-Administered Medications   Medication Dose Route Frequency Provider Last Rate    acetaminophen  650 mg Oral Q6H PRN Shreya Elizabeth PA-C      apixaban  5 mg Oral BID Shreya Elizabeth PA-C      atorvastatin  80 mg Oral HS Shreya Elizabeth PA-C      clonazePAM  0.5 mg Oral Daily Shreya Elizabeth PA-C      clonazePAM  1 mg Oral HS Shreya Elizabeth PA-C      clopidogrel  75 mg Oral Daily Shreya Elizabeth PA-C      ezetimibe  5 mg Oral Daily Shreya Elizabeth PA-C      insulin glargine  28 Units Subcutaneous HS Shreya Elizabeth PA-C      insulin lispro  1-5 Units Subcutaneous HS Shreya Elizabeth PA-C      insulin lispro  1-6 Units Subcutaneous TID AC Shreya Elizabeth PA-C      isosorbide dinitrate  10 mg Oral TID Ousmane Lucas MD      levothyroxine  88 mcg Oral Early Morning Shreya Elizabeth PA-C      lisinopril  20 mg Oral Daily Ousmane Lucas MD      metoprolol tartrate  50 mg Oral Q12H [facility] Shreya Elizabeth PA-C      polyethylene glycol  17 g Oral Daily Rohit Holloway DO      pregabalin  100 mg Oral BID Shreya Elizabeth PA-C      ranolazine  500 mg Oral Q12H [facility] Shreya Elizabeth PA-C      senna-docusate sodium  1 tablet Oral HS Rohit Holloway DO          Today, Patient Was Seen By: Terrance Sigala MD    **Please Note: This note may have been constructed using a voice recognition system.**

## 2024-08-29 NOTE — PLAN OF CARE
Problem: PHYSICAL THERAPY ADULT  Goal: Performs mobility at highest level of function for planned discharge setting.  See evaluation for individualized goals.  Description: Treatment/Interventions: ADL retraining, Functional transfer training, LE strengthening/ROM, Elevations, Therapeutic exercise, Endurance training, Patient/family training, Equipment eval/education, Bed mobility, Gait training, Compensatory technique education, Spoke to case management          See flowsheet documentation for full assessment, interventions and recommendations.  Note:    Problem List: Decreased strength, Decreased endurance, Impaired balance, Decreased mobility, Decreased safety awareness, Pain  Assessment: Patient seen for Physical Therapy evaluation. Patient admitted with SIRS (systemic inflammatory response syndrome) (HCC).  Comorbidities affecting patient's physical performance include: HTN, HLD, A-fib, DM 2, CAD, MI, CABG x 3, obesity, sinus bradycardia.  Personal factors affecting patient at time of initial evaluation include: lives in two story house, ambulating with assistive device, stairs to enter home, inability to navigate community distances, inability to navigate level surfaces without external assistance, inability to perform dynamic tasks in community, and positive fall history. Prior to admission, patient was independent with functional mobility with cane or rollator, independent with ADLS, independent with IADLS, living with spouse in a 2 level home with 3 steps to enter, ambulating household distance, and ambulating community distances.  Please find objective findings from Physical Therapy assessment regarding body systems outlined above with impairments and limitations including weakness, impaired balance, decreased endurance, gait deviations, pain, decreased activity tolerance, decreased functional mobility tolerance, decreased safety awareness, and fall risk.  The Barthel Index was used as a functional  outcome tool presenting with a score of Barthel Index Score: 55 today indicating marked limitations of functional mobility and ADLS.  Patient's clinical presentation is currently unstable/unpredictable as seen in patient's presentation of vital sign response, changing level of pain, increased fall risk, new onset of impairment of functional mobility, decreased endurance, and new onset of weakness. Pt would benefit from continued Physical Therapy treatment to address deficits as defined above and maximize level of functional mobility. As demonstrated by objective findings, the assigned level of complexity for this evaluation is high.The patient's -[facility] Basic Mobility Inpatient Short Form Raw Score is 18. A Raw score of greater than 16 suggests the patient may benefit from discharge to home. Please also refer to the recommendation of the Physical Therapist for safe discharge planning.        Rehab Resource Intensity Level, PT: III (Minimum Resource Intensity)    See flowsheet documentation for full assessment.

## 2024-08-29 NOTE — PLAN OF CARE
Problem: Potential for Falls  Goal: Patient will remain free of falls  Description: INTERVENTIONS:  - Educate patient/family on patient safety including physical limitations  - Instruct patient to call for assistance with activity   - Consult OT/PT to assist with strengthening/mobility   - Keep Call bell within reach  - Keep bed low and locked with side rails adjusted as appropriate  - Keep care items and personal belongings within reach  - Initiate and maintain comfort rounds  - Make Fall Risk Sign visible to staff  - Offer Toileting every 2 Hours, in advance of need  - Initiate/Maintain alarm  - Obtain necessary fall risk management equipment:   - Apply yellow socks and bracelet for high fall risk patients  - Consider moving patient to room near nurses station  Outcome: Progressing     Problem: PAIN - ADULT  Goal: Verbalizes/displays adequate comfort level or baseline comfort level  Description: Interventions:  - Encourage patient to monitor pain and request assistance  - Assess pain using appropriate pain scale  - Administer analgesics based on type and severity of pain and evaluate response  - Implement non-pharmacological measures as appropriate and evaluate response  - Consider cultural and social influences on pain and pain management  - Notify physician/advanced practitioner if interventions unsuccessful or patient reports new pain  Outcome: Progressing     Problem: INFECTION - ADULT  Goal: Absence or prevention of progression during hospitalization  Description: INTERVENTIONS:  - Assess and monitor for signs and symptoms of infection  - Monitor lab/diagnostic results  - Monitor all insertion sites, i.e. indwelling lines, tubes, and drains  - Monitor endotracheal if appropriate and nasal secretions for changes in amount and color  - [place] appropriate cooling/warming therapies per order  - Administer medications as ordered  - Instruct and encourage patient and family to use good hand hygiene  technique  - Identify and instruct in appropriate isolation precautions for identified infection/condition  Outcome: Progressing  Goal: Absence of fever/infection during neutropenic period  Description: INTERVENTIONS:  - Monitor WBC    Outcome: Progressing     Problem: SAFETY ADULT  Goal: Patient will remain free of falls  Description: INTERVENTIONS:  - Educate patient/family on patient safety including physical limitations  - Instruct patient to call for assistance with activity   - Consult OT/PT to assist with strengthening/mobility   - Keep Call bell within reach  - Keep bed low and locked with side rails adjusted as appropriate  - Keep care items and personal belongings within reach  - Initiate and maintain comfort rounds  - Make Fall Risk Sign visible to staff  - Offer Toileting every 2 Hours, in advance of need  - Initiate/Maintain alarm  - Obtain necessary fall risk management equipment:   - Apply yellow socks and bracelet for high fall risk patients  - Consider moving patient to room near nurses station  Outcome: Progressing  Goal: Maintain or return to baseline ADL function  Description: INTERVENTIONS:  -  Assess patient's ability to carry out ADLs; assess patient's baseline for ADL function and identify physical deficits which impact ability to perform ADLs (bathing, care of mouth/teeth, toileting, grooming, dressing, etc.)  - Assess/evaluate cause of self-care deficits   - Assess range of motion  - Assess patient's mobility; develop plan if impaired  - Assess patient's need for assistive devices and provide as appropriate  - Encourage maximum independence but intervene and supervise when necessary  - Involve family in performance of ADLs  - Assess for home care needs following discharge   - Consider OT consult to assist with ADL evaluation and planning for discharge  - Provide patient education as appropriate  Outcome: Progressing  Goal: Maintains/Returns to pre admission functional level  Description:  INTERVENTIONS:  - Perform AM-PAC 6 Click Basic Mobility/ Daily Activity assessment daily.  - Set and communicate daily mobility goal to care team and patient/family/caregiver.   - Collaborate with rehabilitation services on mobility goals if consulted  - Perform Range of Motion 2 times a day.  - Reposition patient every 2 hours.  - Dangle patient 2 times a day  - Stand patient 2 times a day  - Ambulate patient 2 times a day  - Out of bed to chair 2 times a day   - Out of bed for meals 2 times a day  - Out of bed for toileting  - Record patient progress and toleration of activity level   Outcome: Progressing

## 2024-08-29 NOTE — PHYSICAL THERAPY NOTE
PHYSICAL THERAPY EVALUATION/TREATMENT    NAME:  Pete Wetzel  AGE:   74 y.o.  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    CARDIAC CATHETERIZATION Left 10/3/2022    Procedure: Cardiac Left Heart Cath;  Surgeon: Spencer Levy MD;  Location: [facility];  Service: Cardiology    CORONARY ANGIOPLASTY  2009    SHOULDER SURGERY Right     TONSILLECTOMY          08/29/24 1000   PT Last Visit   PT Visit Date 08/29/24   Note Type   Note type Evaluation   Additional Comments BP supine 101/58; sitting 104/57; standing 105/60   Pain Assessment   Pain Assessment Tool 0-10   Pain Score 3   Pain Location/Orientation Orientation: Left;Location: Leg  (With mobility; 0/10 at rest)   Pain Onset/Description Frequency: Intermittent   Effect of Pain on Daily Activities Limits comfort and activity tolerance   Patient's Stated Pain Goal No pain   Hospital Pain Intervention(s) Repositioned;Ambulation/increased activity;Emotional support;Rest   Restrictions/Precautions   Other Precautions Fall Risk;Bed Alarm;Chair Alarm;Telemetry;Pain  (Gabriele on room air)   Home Living   Type of Home House   Home Layout Two level;Stairs to enter without rails  (Full bathroom on first floor; bedroom on second floor; flight of steps with rail to second floor)   Bathroom Shower/Tub Tub/shower unit   Bathroom Toilet Raised   Bathroom Equipment Grab bars in shower;Shower chair  (Patient does not use shower seat)   Bathroom  "Accessibility Accessible   Home Equipment Cane  (Rollator)   Prior Function   Level of [place] Independent with ADLs;Independent with functional mobility;Independent with IADLS   Lives With Spouse   Receives Help From Family   IADLs Independent with driving;Independent with meal prep;Independent with medication management   Falls in the last 6 months 5 to 10  (3 in 3 days recently; an additional 2 in the last 6 months)   Comments Patient ambulates with a cane inside; uses rollator outside   General   Additional Pertinent History Patient is admitted with 3 falls over the past 3 days including a fall down steps.  Patient noted to be hypotensive in the ED with BP 70s/40s.  Patient also with 1 episode of near syncope in the ED.   Family/Caregiver Present No   Cognition   Overall Cognitive Status WFL   Arousal/Participation Cooperative   Orientation Level Oriented X4   Memory Within functional limits   Following Commands Follows multistep commands without difficulty   Comments At least 2 patient identifiers including name and date of birth   Subjective   Subjective \"Feeling much better\"   RLE Assessment   RLE Assessment WFL  (Grossly 3+ to 4 -/5)   LLE Assessment   LLE Assessment WFL  (Grossly 3+/5; (+) edema left lower extremity compared to right lower extremity)   Vision-Basic Assessment   Current Vision Wears glasses only for reading   Light Touch   RLE Light Touch Grossly intact   LLE Light Touch Grossly intact   Bed Mobility   Supine to Sit 6  Modified independent   Additional items Assist x 1;Verbal cues;Increased time required;Bedrails;HOB elevated   Transfers   Sit to Stand 4  Minimal assistance   Additional items Assist x 1;Verbal cues;Increased time required  (Mild retropulsion when for standing)   Stand to Sit 5  Supervision   Additional items Armrests;Assist x 1;Verbal cues;Increased time required   Ambulation/Elevation   Gait pattern Short stride;Step through pattern;Decreased foot clearance   Gait " Assistance 4  Minimal assist  (Progressing to supervision)   Additional items Assist x 1;Verbal cues;Tactile cues   Assistive Device Other (Comment)  (Patient's rollator)   Distance Few steps bed to chair; 100 feet with change in direction   Balance   Static Sitting Fair +   Static Standing   (Fair to occasional F- with rollator)   Ambulatory   (F- progressing to fair with rollator)   Endurance Deficit   Endurance Deficit Yes   Activity Tolerance   Activity Tolerance Patient limited by fatigue;Patient limited by pain   Assessment   Problem List Decreased strength;Decreased endurance;Impaired balance;Decreased mobility;Decreased safety awareness;Pain   Assessment Patient seen for Physical Therapy evaluation. Patient admitted with SIRS (systemic inflammatory response syndrome) (HCC).  Comorbidities affecting patient's physical performance include: HTN, HLD, A-fib, DM 2, CAD, MI, CABG x 3, obesity, sinus bradycardia.  Personal factors affecting patient at time of initial evaluation include: lives in two story house, ambulating with assistive device, stairs to enter home, inability to navigate community distances, inability to navigate level surfaces without external assistance, inability to perform dynamic tasks in community, and positive fall history. Prior to admission, patient was independent with functional mobility with cane or rollator, independent with ADLS, independent with IADLS, living with spouse in a 2 level home with 3 steps to enter, ambulating household distance, and ambulating community distances.  Please find objective findings from Physical Therapy assessment regarding body systems outlined above with impairments and limitations including weakness, impaired balance, decreased endurance, gait deviations, pain, decreased activity tolerance, decreased functional mobility tolerance, decreased safety awareness, and fall risk.  The Barthel Index was used as a functional outcome tool presenting with a score  of Barthel Index Score: 55 today indicating marked limitations of functional mobility and ADLS.  Patient's clinical presentation is currently unstable/unpredictable as seen in patient's presentation of vital sign response, changing level of pain, increased fall risk, new onset of impairment of functional mobility, decreased endurance, and new onset of weakness. Pt would benefit from continued Physical Therapy treatment to address deficits as defined above and maximize level of functional mobility. As demonstrated by objective findings, the assigned level of complexity for this evaluation is high.    The patient's AM-PAC Basic Mobility Inpatient Short Form Raw Score is 18. A Raw score of greater than 16 suggests the patient may benefit from discharge to home. Please also refer to the recommendation of the Physical Therapist for safe discharge planning.   Goals   Patient Goals To go home   STG Expiration Date 09/09/24   Short Term Goal #1 Patient will: Increase bilateral LE strength 1 grade to facilitate independent mobility, Perform all bed mobility tasks independently to improve pt's independence w/ repositioning for decrease risk of skin breakdown, Perform all transfers independently consistently from various height surfaces in order to improve I w/ engagement w/ real-world environments/situations, Ambulate at least 500+ ft. with least restrictive assistive device independently w/o LOB to facilitate return and engagement w/ previous living environment, Navigate flight of stairs independently with unilateral handrail to either improve independence w/ entering home and/or so patient can fully access living areas in home, Increase ambulatory and static and dynamic standing balance 1 grade to decrease risk for falls, Tolerate at least 15 consecutive minutes of activity to demonstrate improved activity tolerance and endurance, and Tolerate 3 hr OOB to faciliate upright tolerance   Plan   Treatment/Interventions ADL  retraining;Functional transfer training;LE strengthening/ROM;Elevations;Therapeutic exercise;Endurance training;Patient/family training;Equipment eval/education;Bed mobility;Gait training;Compensatory technique education;Spoke to case management   PT Frequency 3-5x/wk   Discharge Recommendation   Rehab Resource Intensity Level, PT III (Minimum Resource Intensity)   AM-PAC Basic Mobility Inpatient   Turning in Flat Bed Without Bedrails 3   Lying on Back to Sitting on Edge of Flat Bed Without Bedrails 3   Moving Bed to Chair 3   Standing Up From Chair Using Arms 3   Walk in Room 3   Climb 3-5 Stairs With Railing 3   Basic Mobility Inpatient Raw Score 18   Basic Mobility Standardized Score 41.05   [facility] Highest Level Of Mobility   -HLM Goal 6: Walk 10 steps or more   -HLM Achieved 7: Walk 25 feet or more   Barthel Index   Feeding 10   Bathing 0   Grooming Score 0   Dressing Score 5   Bladder Score 10   Bowels Score 10   Toilet Use Score 5   Transfers (Bed/Chair) Score 10   Mobility (Level Surface) Score 0   Stairs Score 5   Barthel Index Score 55   Additional Treatment Session   Start Time 1000   End Time 1015   Treatment Assessment Patient agreeable to additional ambulation with trial of single-point cane.  Patient transfers sit to stand with supervision and cues for safe hand placement.  Patient ambulates with a cane 40 feet with change in direction, holding cane in right hand initially with minimal assistance progressing to supervision.  Patient transfers stand to sit with supervision and cues for safe hand placement.  A: patient with overall good tolerance to physical therapy evaluation and treatment.  Patient normally ambulates with a cane while inside and uses the rollator outside.  Gait with cane initially unsteady and antalgic but improved with increased distance.  Patient will benefit from continued gait training with his cane as able/tolerated.  Patient understands that if he is having balance  issues and pain in the left lower extremity when ambulating with use of the cane that he will switch to his rollator.  PT also verbalized up/down stairs with patient and patient with good understanding.  While admitted, patient will continue to benefit from skilled physical therapy services to continue to increase his strength, balance, safe functional mobility and gait with a cane versus his rollator.  When medically stable for discharge patient is appropriate for Level III Minimum Resource Intensity.   End of Consult   Patient Position at End of Consult Bed/Chair alarm activated;Bedside chair;All needs within reach   Licensure   [place] License Number  Bijal White PT   Portions of the documentation may have been created using voice recognition software. Occasional wrong word or sound alike substitutions may have occurred due to the inherent limitation of the voice recognition software. Read the chart carefully and recognize, using context, where substitutions have occurred.

## 2024-08-29 NOTE — CONSULTS
Consultation - Cardiology   Pete MENDOZA Damari 74 y.o. male MRN: [id number]  Unit/Bed#: S -01 Encounter: [id number]    Inpatient consult to Cardiology  Consult performed by: Ousmane Lucas MD  Consult ordered by: Rohit Holloway DO          History of Present Illness   Physician Requesting Consult: Preeti Durham*  Reason for Consult / Principal Problem: Syncope      Assessment:  Principal Problem:    SIRS (systemic inflammatory response syndrome) (HCC)  Active Problems:    Hypertension    Hyperlipidemia    Atrial fibrillation (HCC)    Hypothyroidism    Type 2 diabetes mellitus (HCC)    History of coronary artery disease    Falls    Left leg pain      Assessment/ Plan:    Syncope  Patient presents to the ED after 1 episode of mechanical fall followed by a second fall while standing the living room without any prodromal syncopal event.  After the fall patient did not lose consciousness.  Denied having any seizure activity on confusion.  Patient did endorse having brief episode of passing out while he underwent MRI in the hospital  Otherwise patient denies having any chest pain, shortness of breath, palpitations or any other associated symptoms  Patient did endorse having history of orthostatic hypotension and is usually careful while getting up from a seated position  In the ED patient was noted to have orthostatic hypotension and received multiple IV fluid boluses  EKG showed sinus rhythm with no significant ST changes, first-degree AV block.  Troponins remain flat  EF 60%- noted to have grade 1 diastolic dysfunction.  No concerns for aortic stenosis  Upon admission patient was noted to meet SIRS criteria with unclear source of infection and was started on empiric antibiotics.  Patient is lisinopril and chlorthalidone was held upon admission  Patient is currently on Isordil 20 g 3 times daily, Lyrica 100 mg twice daily could be potentially contributing to the patient's orthostatic hypertension, along  with given overall patient's diabetic neuropathy.  Appears to be less likely of cardiac origin at this point.   Would consider lowering Isordil to 10 mg 3 times daily  Consider resuming lisinopril at a reduced dose of 20 mg from his home dose of 40 mg  Consider switching Lyrica to possible duloxetine vs gabapentin with less orthostatic hypotension profile  Recheck orthostatics-if positive okay to give additional liter of IV fluid bolus  Continue to monitor on telemetry    2. Atrial fibrillation  Has remained in sinus rhythm  Continue metoprolol 50 twice daily and Eliquis 5 mg twice daily    3. CAD status post CABG x 3 and 5 stents with recent PCI in 2022   EKG unchanged from prior and troponins remain flat  Denies having any chest pain, shortness of breath    4. Hyperlipidemia continue Lipitor and Zetia    5. hypertension  Held patient's lisinopril and chlorthalidone upon admission  Continuing metoprolol 50 mg twice daily  Consider restarting lisinopril at reduced dose of 20 mg  Consider lowering Isordil to 10 mg 3 times daily  Continue to hold chlorthalidone    6.  Hypothyroidism  Maintained on levothyroxine 88 mcg  TSH not checked on this admission, will order TSH      7.  SIRS  -Unclear source of infection  Currently on antibiotics-management per primary team      HPI:   Mr. Freeman is a 74-year-old male with multiple comorbidities CAD status post CABG x 3, 5 stents in place, A-fib on Eliquis, obesity, hypertension, diabetes mellitus type 2 with peripheral neuropathy, hyperlipidemia, presented with recurrent falls with near syncopal events.  Patient reports he does not have any prodromal events preceding the fall. He was standing in the kitchen and had a sudden fall without loss of consciousness, did not have any seizure-like activity or any confusion after the fall. Although patient did report having a brief syncopal episode when he underwent MRI in the hospital. Patient did report that he had signs of orthostatic  hypotension while at home so he is more careful while getting up from a seated position.  Otherwise patient denies having any chest pain, shortness of breath, palpitations, diarrhea/constipation or any recent illness.  In the ED patient noted to be hypotensive-received IV fluids.  EKG in the ED showed sinus rhythm with no acute ST changes.  Noted to have orthostatic hypotension.  Received multiple fluid boluses. Troponins were negative and echo EF 60% with no wall motion abnormalities.     Cardiology was consulted given patient's syncope and extensive cardiac history .      Review of Systems   Constitutional:  Negative for chills and fever.   HENT:  Negative for ear pain and sore throat.    Eyes:  Negative for pain and visual disturbance.   Respiratory:  Negative for cough and shortness of breath.    Cardiovascular:  Negative for chest pain and palpitations.   Gastrointestinal:  Negative for abdominal pain and vomiting.   Genitourinary:  Negative for dysuria and hematuria.   Musculoskeletal:  Negative for arthralgias and back pain.   Skin:  Negative for color change and rash.   Neurological:  Negative for seizures and syncope.   All other systems reviewed and are negative.     Historical Information   Past Medical History:   Diagnosis Date    Atrial fibrillation (HCC)     CAD (coronary artery disease)     Diabetes mellitus (HCC)     IDDM    Heart attack (HCC)     Heart disease     Hyperlipidemia     Hypertension     Myocardial infarction (HCC) 1989    Neuropathy     S/P triple vessel bypass     Sleep apnea     NO CPAP     Past Surgical History:   Procedure Laterality Date    BACK SURGERY  2010    benign tumor removed from spine    CARDIAC CATHETERIZATION Left 10/3/2022    Procedure: Cardiac Left Heart Cath;  Surgeon: Spencer Levy MD;  Location: [facility];  Service: Cardiology    CORONARY ANGIOPLASTY  2009    SHOULDER SURGERY Right     TONSILLECTOMY       Social History     Substance and Sexual Activity  "  Alcohol Use Not Currently    Comment: social     Social History     Substance and Sexual Activity   Drug Use No     Social History     Tobacco Use   Smoking Status Former   Smokeless Tobacco Never   Tobacco Comments    quit      Family History:     Meds/Allergies          Allergies   Allergen Reactions    Amlodipine Lip Swelling       Objective   Vitals: Blood pressure 106/56, pulse 86, temperature 98.5 °F (36.9 °C), resp. rate 18, height 5' 8\" (1.727 m), weight 104 kg (229 lb 4.5 oz), SpO2 97%., Body mass index is 34.86 kg/m².,   Orthostatic Blood Pressures      Flowsheet Row Most Recent Value   Blood Pressure 106/56 filed at 2024 0800   Patient Position - Orthostatic VS Lying filed at 2024 2101          Systolic (24hrs), Av , Min:79 , Max:123     Diastolic (24hrs), Av, Min:39, Max:79      Intake/Output Summary (Last 24 hours) at 2024 0932  Last data filed at 2024 0819  Gross per 24 hour   Intake 1790 ml   Output 825 ml   Net 965 ml       Weight (last 2 days)       Date/Time Weight    24 1210 104 (229.28)    24 0000 104 (229.28)    24 2200 104 (228.84)            Invasive Devices       Peripheral Intravenous Line  Duration             Peripheral IV 24 Left;Ventral (anterior) Forearm 1 day                        Physical Exam:     Physical Exam  Vitals and nursing note reviewed.   Constitutional:       General: He is not in acute distress.     Appearance: He is well-developed.   HENT:      Head: Normocephalic and atraumatic.   Eyes:      Conjunctiva/sclera: Conjunctivae normal.   Cardiovascular:      Rate and Rhythm: Normal rate and regular rhythm.      Heart sounds: No murmur heard.  Pulmonary:      Effort: Pulmonary effort is normal. No respiratory distress.      Breath sounds: Normal breath sounds.   Abdominal:      Palpations: Abdomen is soft.      Tenderness: There is no abdominal tenderness.   Musculoskeletal:         General: No swelling.      " "Cervical back: Neck supple.   Skin:     General: Skin is warm and dry.      Capillary Refill: Capillary refill takes less than 2 seconds.      Findings: Bruising present.   Neurological:      Mental Status: He is alert.   Psychiatric:         Mood and Affect: Mood normal.              Laboratory Results:        CBC with diff:   Results from last 7 days   Lab Units 08/29/24  0549 08/28/24  0515 08/27/24  1522   WBC Thousand/uL 9.94 11.34* 15.14*   HEMOGLOBIN g/dL 9.8* 10.7* 12.8   HEMATOCRIT % 29.5* 32.9* 39.2   MCV fL 91 92 91   PLATELETS Thousands/uL 189 189 265   RBC Million/uL 3.25* 3.59* 4.32   MCH pg 30.2 29.8 29.6   MCHC g/dL 33.2 32.5 32.7   RDW % 14.8 14.6 14.7   MPV fL 12.1 11.7 12.1   NRBC AUTO /100 WBCs 0 0 0         CMP:  Results from last 7 days   Lab Units 08/29/24  0549 08/28/24  0515 08/27/24  1522   POTASSIUM mmol/L 3.6 4.1 4.2   CHLORIDE mmol/L 106 108 101   CO2 mmol/L 26 26 23   BUN mg/dL 20 24 26*   CREATININE mg/dL 1.00 1.15 1.29   CALCIUM mg/dL 8.0* 8.0* 8.9   AST U/L 41* 44* 63*   ALT U/L 21 22 26   ALK PHOS U/L 65 66 80   EGFR ml/min/1.73sq m 73 62 54         BMP:  Results from last 7 days   Lab Units 08/29/24  0549 08/28/24  0515 08/27/24  1522   POTASSIUM mmol/L 3.6 4.1 4.2   CHLORIDE mmol/L 106 108 101   CO2 mmol/L 26 26 23   BUN mg/dL 20 24 26*   CREATININE mg/dL 1.00 1.15 1.29   CALCIUM mg/dL 8.0* 8.0* 8.9       BNP:  No results for input(s): \"BNP\" in the last 72 hours.    Magnesium:       Coags:   Results from last 7 days   Lab Units 08/27/24  1522   PTT seconds 35*   INR  1.23*       TSH:       Hemoglobin A1C   Results from last 7 days   Lab Units 08/27/24  1522   HEMOGLOBIN A1C % 6.1*       Lipid Profile:         Cardiac testing:   No results found for this or any previous visit.    No results found for this or any previous visit.    No results found for this or any previous visit.    No results found for this or any previous visit.        Imaging: I have personally reviewed pertinent " reports.    Echo complete w/ contrast if indicated    Result Date: 8/28/2024  Narrative:   Left Ventricle: Left ventricular cavity size is normal. Wall thickness is normal. The left ventricular ejection fraction is 60%. Systolic function is normal. Wall motion is normal. Diastolic function is mildly abnormal, consistent with grade I (abnormal) relaxation.     XR chest pa & lateral    Result Date: 8/28/2024  Narrative: XR CHEST PA & LATERAL INDICATION: leukocytosis. COMPARISON: Chest radiograph September 28, 2022 FINDINGS: Sternotomy wires. Clear lungs. No pneumothorax or pleural effusion. Normal cardiomediastinal silhouette. Degenerative changes of the spine and shoulders. No acute osseous abnormality. Normal upper abdomen.     Impression: No acute cardiopulmonary disease. Workstation performed: [id number]     XR knee 4+ views LEFT    Result Date: 8/28/2024  Narrative: XR KNEE 4+ VW LEFT INJURY INDICATION: Fall. COMPARISON: None FINDINGS: No acute fracture or dislocation. No joint effusion. Mild tricompartmental osteoarthritis, evidenced by marginal osteophytes. No lytic or blastic osseous lesion. Distal quadriceps, patellar and tibial tuberosity enthesopathy. Atherosclerotic disease. Multiple posteromedial calf surgical clips.     Impression: No acute osseous abnormality. Computerized Assisted Algorithm (CAA) may have been used to analyze all applicable images. Workstation performed: [id number]     XR tibia fibula 2 views LEFT    Result Date: 8/28/2024  Narrative: XR TIBIA FIBULA 2 VW LEFT INDICATION: Fall. COMPARISON: None FINDINGS: No acute fracture or dislocation. Well-corticated ossicle adjacent to the medial malleolus attributed to old injury. No significant degenerative changes. No lytic or blastic osseous lesion. Patellar, tibial tuberosity and retrocalcaneal enthesopathy. Posteromedial surgical clips. Small distal Achilles calcifications.     Impression: No acute osseous abnormality. Computerized Assisted  Algorithm (CAA) may have been used to analyze all applicable images. Workstation performed: [id number]     CT head without contrast    Result Date: 8/27/2024  Narrative: CT BRAIN - WITHOUT CONTRAST INDICATION:   falls. COMPARISON: MRI brain dated April 16, 2013. TECHNIQUE:  CT examination of the brain was performed.  Multiplanar 2D reformatted images were created from the source data. Radiation dose length product (DLP) for this visit:  984 mGy-cm .  This examination, like all CT scans performed in the [facility], was performed utilizing techniques to minimize radiation dose exposure, including the use of iterative reconstruction and automated exposure control. IMAGE QUALITY:  Diagnostic. FINDINGS: PARENCHYMA:  No intracranial mass, mass effect or midline shift. No CT signs of acute infarction.  No acute parenchymal hemorrhage. There is mild to moderate periventricular white matter low attenuation which is nonspecific and most likely related to chronic small vessel ischemic changes. VENTRICLES AND EXTRA-AXIAL SPACES:  Normal for the patient's age. VISUALIZED ORBITS: Normal visualized orbits. PARANASAL SINUSES: There is complete opacification of the right maxillary sinus. CALVARIUM AND EXTRACRANIAL SOFT TISSUES:  Normal.     Impression: No acute intracranial abnormality. Mild to moderate chronic small vessel ischemic changes. Workstation performed: [id number]     MRI lumbar spine w wo contrast    Result Date: 8/27/2024  Narrative: MRI LUMBAR SPINE WITH AND WITHOUT CONTRAST INDICATION: Back pain and urinary retention. COMPARISON: 2/22/2016. TECHNIQUE:  Multiplanar, multisequence imaging of the lumbar spine was performed before and after gadolinium administration. . IV Contrast:  10 mL of Gadobutrol injection (SINGLE-DOSE) IMAGE QUALITY:  Diagnostic FINDINGS: VERTEBRAL BODIES: Mild lumbar levoscoliosis SACRUM: Limited evaluation, unremarkable. DISTAL CORD AND CONUS: Limited visualization of the conus,  terminating at T11-12. PARASPINAL SOFT TISSUES: No mass or fluid collection. LOWER THORACIC DISC SPACES: T11-12 posterior disc bulge and disc osteophytes. Mild central canal stenosis. LUMBAR DISC SPACES: L1-L2: Posterior disc bulge and disc osteophytes. Facet and ligamentum flavum hypertrophy. Mild central canal stenosis. No neural foraminal narrowing. L2-L3: Posterior disc bulge and disc osteophytes and superimposed broad-based posterior disc bulge. Facet and ligamentum flavum hypertrophy. Moderate central canal stenosis. Mild bilateral neural foraminal narrowing. Mild encroachment of the traversing L3 nerve roots in the subarticular zones. L3-L4: Posterior disc bulge and disc osteophytes. Facet osteophytosis. Mild central canal stenosis. Mild bilateral neural foraminal narrowing. L4-L5: Posterior disc bulge and disc osteophytes. Facet and ligamentum flavum hypertrophy. Facet osteophytosis. Moderate central canal stenosis. Encroachment of the traversing L5 nerve roots in the subarticular zones. Moderate to severe bilateral neural foraminal narrowing. L5-S1: Posterior disc osteophytes. Facet osteophytosis. No central canal stenosis. Mild to moderate bilateral neural foraminal narrowing. POSTCONTRAST IMAGING:  No abnormal enhancement. OTHER FINDINGS:  None.     Impression: Chronic disc and facet degenerative change at the lumbar spine resulting in nerve root encroachment. Workstation performed: [id number]       EKG reviewed personally:   Telemetry reviewed personally:       Counseling / Coordination of Care  Total floor / unit time spent today  minutes.  Greater than 50% of total time was spent with the patient and / or family counseling and / or coordination of care.  A description of the counseling / coordination of care: .        Code Status: Level 1 - Full Code

## 2024-08-29 NOTE — ASSESSMENT & PLAN NOTE
Patient's port accessed using sterile procedure. Blood return noted. Lab tubes drawn, labeled and sent to lab. Port flushed with saline and heparin. Patient tolerated lab draw well.       Eliane Wheeler RN     SIRS criteria met is tachycardia and leukocytosis, he was hypotensive to the 70s systolic as well  Unclear source of infection.  Chest x-ray with no disease, urine with only minimal pyuria  CXR no findings of acute cardiopulmonary disease, pending official read  Initial lactic 1.5.  Received 30 cc/kg fluid for hypotension  Pro-Vitaliy of 0.07  Blood cultures x 2 pending  Consider possible bacteremia as he has several wounds from his numerous falls over the last several days    Plan  Patient did well off antibiotics.  No need on discharge   blood cultures are negative at 48 hours, remained afebrile throughout admission

## 2024-08-29 NOTE — ASSESSMENT & PLAN NOTE
SIRS criteria met is tachycardia and leukocytosis, he was hypotensive to the 70s systolic as well  Unclear source of infection.  Chest x-ray with no disease, urine with only minimal pyuria  CXR no findings of acute cardiopulmonary disease, pending official read  Initial lactic 1.5.  Received 30 cc/kg fluid for hypotension  Pro-Vitaliy of 0.07  Blood cultures x 2 pending  Consider possible bacteremia as he has several wounds from his numerous falls over the last several days    Plan  Monitor off antibiotics for now; given no source of infection, and patient's asymptomatic state  Blood cultures are negative at 24 hours, WBC down trended, remains afebrile

## 2024-08-29 NOTE — CASE MANAGEMENT
Case Management Discharge Planning Note    Patient name Pete MENDOZA Damari  Location S /S -01 MRN [id number]  : 1950 Date 2024       Current Admission Date: 2024  Current Admission Diagnosis:SIRS (systemic inflammatory response syndrome) (HCC)   Patient Active Problem List    Diagnosis Date Noted Date Diagnosed    Falls 2024     SIRS (systemic inflammatory response syndrome) (HCC) 2024     Left leg pain 2024     History of coronary artery disease 10/02/2022     Sinus bradycardia 10/02/2022     Hypothyroidism 2022     Type 2 diabetes mellitus (HCC) 2022     Hypertension      Heart attack (HCC)      S/P triple vessel bypass      Hyperlipidemia      Atrial fibrillation (HCC)      Morbid obesity (HCC)      Supraspinatus tendon tear, right, initial encounter 2019     Infraspinatus tendon tear, right, initial encounter 2019       LOS (days): 2  Geometric Mean LOS (GMLOS) (days):   Days to GMLOS:     OBJECTIVE:  Risk of Unplanned Readmission Score: 15.83         Current admission status: Inpatient   Preferred Pharmacy:   [id number] - [place], [place] - [address]  [address]  [place] [address]  Phone: [phone] Fax: [phone]    Primary Care Provider: Issa Hull MD    Primary Insurance: [facility]  Secondary Insurance: MEDICARE    DISCHARGE DETAILS:    Discharge planning discussed with:: patient  Freedom of Choice: Yes  Comments - Freedom of Choice: CM f/u with patient re: PT rcmd for HHC. Patient relayed preference for [facility] but is open to blanket referral if needed. Referral sent to [facility] via aidin - message received [facility] to [facility] ([phone]), s/w Danette, re: needed PT/OT/SN HHC orders for [facility]. Danette to send message to pt's PCP Kurtis for requested services. CM notified [facility] via aidin of same.                      Requested Home Health Care         Is the patient interested in HHC at discharge?: Yes  Home Health Discipline requested:: Nursing, Occupational Therapy, Physical Therapy  Home Health Agency Name:: [facility]  Home Health Follow-Up Provider:: PCP  Home Health Services Needed:: Evaluate Functional Status and Safety, Gait/ADL Training, Strengthening/Theraputic Exercises to Improve Function  Homebound Criteria Met:: Requires the Assistance of Another Person for Safe Ambulation or to Leave the Home, Uses an Assist Device (i.e. cane, walker, etc)  Supporting Clincal Findings:: Limited Endurance, Fatigues Easliy in Short Distances    DME Referral Provided  Referral made for DME?: No    Other Referral/Resources/Interventions Provided:  Interventions: HHC  Referral Comments: Referral sent to [facility] via aidin - unable to accept until [facility]. [facility] contacted [facility] phone, s/w Danette, re: HHC - PT/OT/SN orders needed for [facility]. Danette to send message to pt's PCP. CM notified [facility] via aidin of same.         Treatment Team Recommendation: Home with Home Health Care  Discharge Destination Plan:: Home with Home Health Care  Transport at Discharge : Family

## 2024-08-29 NOTE — ASSESSMENT & PLAN NOTE
Pt initially hypotensive on presentation, responded well to IVF   Will adjust home medications per cardiology recommendations  Continue to monitor BP and avoid hypotension   Since responsive to IV fluids will consider bolus in incidents of hypotension

## 2024-08-30 ENCOUNTER — HOME HEALTH ADMISSION (OUTPATIENT)
Dept: HOME HEALTH SERVICES | Facility: HOME HEALTHCARE | Age: 74
End: 2024-08-30
Payer: MEDICARE

## 2024-08-30 VITALS
HEART RATE: 89 BPM | SYSTOLIC BLOOD PRESSURE: 121 MMHG | TEMPERATURE: 98.5 F | BODY MASS INDEX: 34.75 KG/M2 | OXYGEN SATURATION: 95 % | WEIGHT: 229.28 LBS | DIASTOLIC BLOOD PRESSURE: 58 MMHG | RESPIRATION RATE: 18 BRPM | HEIGHT: 68 IN

## 2024-08-30 LAB
ANION GAP SERPL CALCULATED.3IONS-SCNC: 5 MMOL/L (ref 4–13)
BUN SERPL-MCNC: 17 MG/DL (ref 5–25)
CALCIUM SERPL-MCNC: 7.9 MG/DL (ref 8.4–10.2)
CHLORIDE SERPL-SCNC: 106 MMOL/L (ref 96–108)
CO2 SERPL-SCNC: 26 MMOL/L (ref 21–32)
CREAT SERPL-MCNC: 0.91 MG/DL (ref 0.6–1.3)
DME PARACHUTE DELIVERY DATE REQUESTED: NORMAL
DME PARACHUTE ITEM DESCRIPTION: NORMAL
DME PARACHUTE ORDER STATUS: NORMAL
DME PARACHUTE SUPPLIER NAME: NORMAL
DME PARACHUTE SUPPLIER PHONE: NORMAL
ERYTHROCYTE [DISTWIDTH] IN BLOOD BY AUTOMATED COUNT: 15 % (ref 11.6–15.1)
GFR SERPL CREATININE-BSD FRML MDRD: 82 ML/MIN/1.73SQ M
GLUCOSE SERPL-MCNC: 105 MG/DL (ref 65–140)
GLUCOSE SERPL-MCNC: 110 MG/DL (ref 65–140)
GLUCOSE SERPL-MCNC: 140 MG/DL (ref 65–140)
HCT VFR BLD AUTO: 27.7 % (ref 36.5–49.3)
HGB BLD-MCNC: 9.2 G/DL (ref 12–17)
MCH RBC QN AUTO: 30 PG (ref 26.8–34.3)
MCHC RBC AUTO-ENTMCNC: 33.2 G/DL (ref 31.4–37.4)
MCV RBC AUTO: 90 FL (ref 82–98)
PLATELET # BLD AUTO: 183 THOUSANDS/UL (ref 149–390)
PMV BLD AUTO: 12.3 FL (ref 8.9–12.7)
POTASSIUM SERPL-SCNC: 3.5 MMOL/L (ref 3.5–5.3)
RBC # BLD AUTO: 3.07 MILLION/UL (ref 3.88–5.62)
SODIUM SERPL-SCNC: 137 MMOL/L (ref 135–147)
WBC # BLD AUTO: 9.31 THOUSAND/UL (ref 4.31–10.16)

## 2024-08-30 PROCEDURE — 99232 SBSQ HOSP IP/OBS MODERATE 35: CPT | Performed by: INTERNAL MEDICINE

## 2024-08-30 PROCEDURE — 97167 OT EVAL HIGH COMPLEX 60 MIN: CPT

## 2024-08-30 PROCEDURE — 85027 COMPLETE CBC AUTOMATED: CPT

## 2024-08-30 PROCEDURE — 82948 REAGENT STRIP/BLOOD GLUCOSE: CPT

## 2024-08-30 PROCEDURE — 99239 HOSP IP/OBS DSCHRG MGMT >30: CPT | Performed by: INTERNAL MEDICINE

## 2024-08-30 PROCEDURE — 80048 BASIC METABOLIC PNL TOTAL CA: CPT

## 2024-08-30 RX ORDER — LISINOPRIL 5 MG/1
5 TABLET ORAL
Qty: 30 TABLET | Refills: 0 | Status: CANCELLED | OUTPATIENT
Start: 2024-08-30

## 2024-08-30 RX ORDER — LISINOPRIL 5 MG/1
5 TABLET ORAL DAILY
Qty: 30 TABLET | Refills: 0 | Status: SHIPPED | OUTPATIENT
Start: 2024-08-30 | End: 2024-09-29

## 2024-08-30 RX ORDER — LISINOPRIL 5 MG/1
5 TABLET ORAL DAILY
Status: DISCONTINUED | OUTPATIENT
Start: 2024-08-30 | End: 2024-08-30 | Stop reason: HOSPADM

## 2024-08-30 RX ORDER — ISOSORBIDE DINITRATE 5 MG/1
5 TABLET ORAL 3 TIMES DAILY
Qty: 90 TABLET | Refills: 0 | Status: CANCELLED | OUTPATIENT
Start: 2024-08-30 | End: 2024-09-29

## 2024-08-30 RX ORDER — ISOSORBIDE DINITRATE 10 MG/1
5 TABLET ORAL 3 TIMES DAILY
Status: DISCONTINUED | OUTPATIENT
Start: 2024-08-30 | End: 2024-08-30

## 2024-08-30 RX ADMIN — CLONAZEPAM 0.5 MG: 0.5 TABLET ORAL at 09:26

## 2024-08-30 RX ADMIN — ISOSORBIDE DINITRATE 5 MG: 10 TABLET ORAL at 09:26

## 2024-08-30 RX ADMIN — PREGABALIN 100 MG: 100 CAPSULE ORAL at 09:26

## 2024-08-30 RX ADMIN — LEVOTHYROXINE SODIUM 88 MCG: 88 TABLET ORAL at 04:40

## 2024-08-30 RX ADMIN — METOPROLOL TARTRATE 50 MG: 50 TABLET, FILM COATED ORAL at 09:26

## 2024-08-30 RX ADMIN — RANOLAZINE 500 MG: 500 TABLET, FILM COATED, EXTENDED RELEASE ORAL at 09:27

## 2024-08-30 RX ADMIN — CLOPIDOGREL BISULFATE 75 MG: 75 TABLET ORAL at 09:26

## 2024-08-30 RX ADMIN — EZETIMIBE 5 MG: 10 TABLET ORAL at 09:26

## 2024-08-30 RX ADMIN — APIXABAN 5 MG: 5 TABLET, FILM COATED ORAL at 09:26

## 2024-08-30 RX ADMIN — POLYETHYLENE GLYCOL 3350 17 G: 17 POWDER, FOR SOLUTION ORAL at 09:26

## 2024-08-30 RX ADMIN — LISINOPRIL 5 MG: 5 TABLET ORAL at 09:29

## 2024-08-30 NOTE — OCCUPATIONAL THERAPY NOTE
Occupational Therapy Evaluation     Patient Name: Pete Wetzel  Today's Date: 2024  Problem List  Principal Problem:    SIRS (systemic inflammatory response syndrome) (HCC)  Active Problems:    Hypertension    Hyperlipidemia    Atrial fibrillation (HCC)    Hypothyroidism    Type 2 diabetes mellitus (HCC)    History of coronary artery disease    Falls    Left leg pain    Past Medical History  Past Medical History:   Diagnosis Date    Atrial fibrillation (HCC)     CAD (coronary artery disease)     Diabetes mellitus (HCC)     IDDM    Heart attack (HCC)     Heart disease     Hyperlipidemia     Hypertension     Myocardial infarction (HCC)     Neuropathy     S/P triple vessel bypass     Sleep apnea     NO CPAP     Past Surgical History  Past Surgical History:   Procedure Laterality Date    BACK SURGERY  2010    benign tumor removed from spine    CARDIAC CATHETERIZATION Left 10/3/2022    Procedure: Cardiac Left Heart Cath;  Surgeon: Spencer Levy MD;  Location: [facility];  Service: Cardiology    CORONARY ANGIOPLASTY  2009    SHOULDER SURGERY Right     TONSILLECTOMY          Patient's identity confirmed via 2 patient identifiers (full name and ) at start of session        24 1140   OT Last Visit   OT Visit Date 24   Note Type   Note type Evaluation   Pain Assessment   Pain Assessment Tool 0-10   Pain Score 4   Pain Location/Orientation Orientation: Left;Location: Leg   Pain Onset/Description Onset: Ongoing;Descriptor: Aching   Effect of Pain on Daily Activities limits comfort, functional reach, activity tolerance   Patient's Stated Pain Goal No pain   Hospital Pain Intervention(s) Repositioned;Ambulation/increased activity;Emotional support   Restrictions/Precautions   Weight Bearing Precautions Per Order No   Other Precautions Fall Risk;Pain;Telemetry   Home Living   Type of Home House   Home Layout Two level;Bed/bath upstairs;Able to live on main level with bedroom/bathroom  (bathroom  "only on main level, no bath upstairs)   Bathroom Shower/Tub Tub/shower unit   Bathroom Toilet Raised   Bathroom Equipment Grab bars in shower;Shower chair   Bathroom Accessibility Accessible   Home Equipment Cane;Reacher;Sock aid  (rollator)   Additional Comments Pt plans to stay on first floor in recliner upon DC   Prior Function   Level of [place] Independent with ADLs;Independent with functional mobility;Independent with IADLS   Lives With Spouse   Receives Help From Family   IADLs Independent with driving;Independent with medication management;Family/Friend/Other provides meals   Falls in the last 6 months 5 to 10  (3 in hte last 3 days; 2 additional prior)   Vocational Retired   Comments Pt reports he is (I) w/ ADLs PTA, uses SPC indoors, rollator in community. Wife completes house chores/IADLs.   Lifestyle   Autonomy Pt lives w/ spouse in a 2 level house and is (I) w/ ADLs PTA, SPC vs rollator, (+) falls, (+)    Reciprocal Relationships Supportive spouse who is home 24/7   Service to Others Retired   Intrinsic Gratification Pt enjoys sitting on his porch outside   General   Additional Pertinent History Pt admitted due to multiple falls, no acute injuries per imaging. Found to have SIRS. PMH includes: HTN, HLD, A-fib, T2DM, CAD, obesity, hx of triple bypass   Family/Caregiver Present Yes  (spouse)   Subjective   Subjective \"I'm ready to break free\"   ADL   Where Assessed Edge of bed   Eating Assistance 7  Independent   Eating Deficit Setup;Beverage management   Grooming Assistance 6  Modified Independent   UB Bathing Assistance 6  Modified Independent   LB Bathing Assistance 5  Supervision/Setup   UB Dressing Assistance 6  Modified independent   UB Dressing Deficit Setup;Thread RUE;Thread LUE;Pull over head;Pull down in back  (sitting EOB)   LB Dressing Assistance 4  Minimal Assistance   LB Dressing Deficit Setup;Verbal cueing;Increased time to complete;Supervision/safety;Don/doff R sock;Don/doff L " sock;Thread RLE into pants;Thread LLE into pants;Pull up over hips;Fasteners;Don/doff L shoe;Don/doff R shoe  (sitting EOB, close S in stance to pull shorts over hips, A to don L sock due to pain, A to tie L shoe)   Toileting Assistance  5  Supervision/Setup   Bed Mobility   Supine to Sit 6  Modified independent   Additional items HOB elevated;Increased time required   Transfers   Sit to Stand 5  Supervision   Additional items Assist x 1;Increased time required;Verbal cues   Stand to Sit 5  Supervision   Additional items Assist x 1;Increased time required;Verbal cues   Additional Comments bracing w/ BLEs against EOB, x 2 STS performed this session   Functional Mobility   Functional Mobility 5  Supervision   Additional Comments Household distance w/ Rollator and S. Denies symptoms.   Additional items   (personal rollator)   Balance   Static Sitting Good   Dynamic Sitting Fair +   Static Standing Fair   Dynamic Standing Fair   Activity Tolerance   Activity Tolerance Patient tolerated treatment well;Patient limited by pain   Medical Staff Made Aware Spoke to CM Sue   Nurse Made Aware yes, RN ok to see pt   RUE Assessment   RUE Assessment WFL   LUE Assessment   LUE Assessment WFL   Hand Function   Gross Motor Coordination Functional   Fine Motor Coordination Functional   Sensation   Light Touch No apparent deficits   Vision-Basic Assessment   Current Vision Wears glasses all the time   Cognition   Overall Cognitive Status WFL   Arousal/Participation Alert;Cooperative   Attention Within functional limits   Orientation Level Oriented X4   Memory Within functional limits   Following Commands Follows all commands and directions without difficulty   Comments Pleasant and agreeable, good problem solving, safety.   Assessment   Limitation Decreased ADL status;Decreased self-care trans;Decreased high-level ADLs;Decreased endurance  (pain, balance, fxnl mobility, act violeta, fxnl reach, standing violeta, and strength)   Prognosis  Good   Assessment Pt is a 74 y.o. male seen for OT evaluation s/p admit to [facility] on 8/27/2024 w/SIRS (systemic inflammatory response syndrome) (HCC). Prior to admission, pt was living with spouse in a 2 level house, (I) with ADLs, light (A) with IADLs, SPC vs rollator, (+) falls, (+) . Personal and environmental factors affecting patient at time of evaluation include difficulty completing ADLs, difficulty completing IADLs, and inaccessible bedroom environment . Personal factors supporting patient at time of evaluation include age, (I) PLOF, supportive spouse, attitude towards recovery, and FFSU. Based upon this evaluation, pt is functioning below baseline due to generalized weakness/deconditioning, impaired balance, endurance, standing tolerance, functional reach and pain in LLE impacting performance of ADLs/mobility. Pt will benefit from continued skilled inpatient OT to maximize safety, level of independence and overall performance in ADLs, functional transfers, functional mobility to return to functional baseline/highest level of function.   Goals   Patient Goals to break free (DC from hospital)   LTG Time Frame 10-14   Long Term Goal #1 see goals below   Plan   Treatment Interventions ADL retraining;Functional transfer training;Endurance training;Patient/family training;Equipment evaluation/education;Compensatory technique education   Goal Expiration Date 09/09/24   OT Treatment Day 0   OT Frequency 2-3x/wk   Discharge Recommendation   Rehab Resource Intensity Level, OT III (Minimum Resource Intensity)   Equipment Recommended Bedside commode   Commode Type Wide   AM-PAC Daily Activity Inpatient   Lower Body Dressing 3   Bathing 3   Toileting 3   Upper Body Dressing 4   Grooming 4   Eating 4   Daily Activity Raw Score 21   Daily Activity Standardized Score (Calc for Raw Score >=11) 44.27   AM-PAC Applied Cognition Inpatient   Following a Speech/Presentation 4   Understanding Ordinary  Conversation 4   Taking Medications 3   Remembering Where Things Are Placed or Put Away 4   Remembering List of 4-5 Errands 3   Taking Care of Complicated Tasks 3   Applied Cognition Raw Score 21   Applied Cognition Standardized Score 44.3   End of Consult   Patient Position at End of Consult Bedside chair;All needs within reach   Nurse Communication Nurse aware of consult     GOALS:    *Goals established to promote patient goal of to break free ([facility]):      *Patient will perform grooming tasks standing at sink with (I) in order to increase overall independence    *LB ADL with  mod (I)  using AE prn for inc'd independence with self care    *Toileting with (I) for clothing management and hygiene to increase hygiene/thoroughness in order to reduce caregiver burden    *Pt will demonstrate use of long handled AE during 100% of tx sessions for increased ADL safety and independence following D/C     *ADL transfers with (I) for inc'd independence with ADLs/purposeful tasks    *Patient will increase functional mobility to and from bathroom with rolling walker independently to increase independence with toileting    *Increase stand tolerance x 5-8  m for inc'd tolerance with standing purposeful tasks including meal prep/household management    The patient's raw score on the -PAC Daily Activity Inpatient Short Form is 21. A raw score of greater than or equal to 19 suggests the patient may benefit from discharge to home. Please also refer to the recommendation of the Occupational Therapist for safe discharge planning.      TARUN Martinez, OTR/L    [place] License [id number]  [place] License [id number]

## 2024-08-30 NOTE — PROGRESS NOTES
Patient:  JARRET MENDOZA MICHAELA    MRN:  [id number]    Nguyễn Request ID:  [id number]    Level of care reserved:  Home Health Agency    Partner Reserved:  [facility] [place], [place] [address] [phone]    Clinical needs requested:    Geography searched:  [phone]    Start of Service:    Request sent:  10:45am EDT on 8/29/2024 by Sue Tompkins    Partner reserved:  1:27pm EDT on 8/30/2024 by Sue Tompkins    Choice list shared:  1:12pm EDT on 8/30/2024 by Sue Tompkins

## 2024-08-30 NOTE — DISCHARGE INSTR - AVS FIRST PAGE
Dear Pete Wetzel,     It was our pleasure to care for you here at [facility].  It is our hope that we were always able to exceed the expected standards for your care during your stay.  You were hospitalized due to falls.  You were cared for on the 3rd floor by Terrance Sigala MD under the service of Preeti Durham* with the [facility] who covers for your primary care physician (PCP), Issa Hull MD, while you were hospitalized.  If you have any questions or concerns related to this hospitalization, you may contact us at .  For follow up as well as any medication refills, we recommend that you follow up with your primary care physician.  A registered nurse will reach out to you by phone within a few days after your discharge to answer any additional questions that you may have after going home.  However, at this time we provide for you here, the most important instructions / recommendations at discharge:     Notable Medication Adjustments -   STOP: Take isosorbide dinitrate (Isordil) 20 mg tablet 3 times a day  STOP: chlorthalidone 25 mg tablet   STOP: potassium chloride (K-DUR,KLOR-CON) 10 mEq tablet  CHANGE: Take Lisinopril (Zestril) 5 mg tablet by mouth daily  Testing Required after Discharge -   None  ** Please contact your PCP to request testing orders for any of the testing recommended here **  Important follow up information -   Please see your PCP within a week for further evaluation of your medications   [facility] and [facility]  Other Instructions -   Please wear compression stockings to help regulate blood flow  Please go slowly when rising from the bed to a sitting position and then standing  If symptoms return please reach out to your PCP if unable please return to the hospital  Please review this entire after visit  summary as additional general instructions including medication list, appointments, activity, diet, any pertinent wound care, and other additional recommendations from your care team that may be provided for you.      Sincerely,     Terrance Sigala MD

## 2024-08-30 NOTE — PLAN OF CARE
Problem: OCCUPATIONAL THERAPY ADULT  Goal: Performs self-care activities at highest level of function for planned discharge setting.  See evaluation for individualized goals.  Description: Treatment Interventions: ADL retraining, Functional transfer training, Endurance training, Patient/family training, Equipment evaluation/education, Compensatory technique education  Equipment Recommended: Bedside commode       See flowsheet documentation for full assessment, interventions and recommendations.   Note: Limitation: Decreased ADL status, Decreased self-care trans, Decreased high-level ADLs, Decreased endurance (pain, balance, fxnl mobility, act violeta, fxnl reach, standing violeta, and strength)  Prognosis: Good  Assessment: Pt is a 74 y.o. male seen for OT evaluation s/p admit to [facility] on 8/27/2024 w/SIRS (systemic inflammatory response syndrome) (HCC). Prior to admission, pt was living with spouse in a 2 level house, (I) with ADLs, light (A) with IADLs, SPC vs rollator, (+) falls, (+) . Personal and environmental factors affecting patient at time of evaluation include difficulty completing ADLs, difficulty completing IADLs, and inaccessible bedroom environment . Personal factors supporting patient at time of evaluation include age, (I) PLOF, supportive spouse, attitude towards recovery, and FFSU. Based upon this evaluation, pt is functioning below baseline due to generalized weakness/deconditioning, impaired balance, endurance, standing tolerance, functional reach and pain in LLE impacting performance of ADLs/mobility. Pt will benefit from continued skilled inpatient OT to maximize safety, level of independence and overall performance in ADLs, functional transfers, functional mobility to return to functional baseline/highest level of function.     Rehab Resource Intensity Level, OT: III (Minimum Resource Intensity)     TARUN Martinez, OTR/L  [place] License [id number]  [place] License [id number]

## 2024-08-30 NOTE — CASE MANAGEMENT
Case Management Discharge Planning Note    Patient name Pete MENDOZA Damari  Location S /S -01 MRN [id number]  : 1950 Date 2024       Current Admission Date: 2024  Current Admission Diagnosis:SIRS (systemic inflammatory response syndrome) (HCC)   Patient Active Problem List    Diagnosis Date Noted Date Diagnosed    Falls 2024     SIRS (systemic inflammatory response syndrome) (HCC) 2024     Left leg pain 2024     History of coronary artery disease 10/02/2022     Sinus bradycardia 10/02/2022     Hypothyroidism 2022     Type 2 diabetes mellitus (HCC) 2022     Hypertension      Heart attack (HCC)      S/P triple vessel bypass      Hyperlipidemia      Atrial fibrillation (HCC)      Morbid obesity (HCC)      Supraspinatus tendon tear, right, initial encounter 2019     Infraspinatus tendon tear, right, initial encounter 2019       LOS (days): 3  Geometric Mean LOS (GMLOS) (days):   Days to GMLOS:     OBJECTIVE:  Risk of Unplanned Readmission Score: 15.44         Current admission status: Inpatient   Preferred Pharmacy:   [id number] - [place], [place] - [address]  [address]  [place] [address]  Phone: [phone] Fax: [phone]    Primary Care Provider: Issa Hull MD    Primary Insurance: [facility]  Secondary Insurance: MEDICARE    DISCHARGE DETAILS:    Discharge planning discussed with:: patient and pt's wife Shea at bedside  Freedom of Choice: Yes  Comments - Freedom of Choice: CM received call from [facility] clinic requesting patient clinicals for PT/OT/SN orders. [facility] [phone] for their review. However per the [facility] may take some time to [facility] for HHC. CM f/u with patient re: same. Pt choosing to go through medicare coverage to avoid delay in HHC services needed. [facility]ified [facility] via phone ([phone]) of same. [facility] confirmed able to  provide services for pt through  coverage. IMM reviewed with pt and spouse at bedside, pt agrees with discharge determination.  CM contacted family/caregiver?: Yes  Were Treatment Team discharge recommendations reviewed with patient/caregiver?: Yes     Were patient/caregiver advised of the risks associated with not following Treatment Team discharge recommendations?: Yes    Contacts  Patient Contacts: Shea (spouse)  Relationship to Patient:: Family  Contact Method: In Person  Reason/Outcome: Referral, Discharge Planning, Continuity of Care, Emergency Contact              Other Referral/Resources/Interventions Provided:  Interventions: [facility]  Referral Comments: MARIEL reserved in aidin. AVS updated.         Treatment Team Recommendation: Home with Home Health Care  Discharge Destination Plan:: Home with Home Health Care  Transport at Discharge : Family                          IMM reviewed with patient and caregiver, patient and caregiver agrees with discharge determination.  IMM Given (Date):: 08/30/24  IMM Given to:: Patient  Family notified:: Patient and spouse at bedside

## 2024-08-30 NOTE — DISCHARGE SUMMARY
[facility]  Discharge- Pete MENDOZA Damari 1950, 74 y.o. male MRN: [id number]  Unit/Bed#: S -01 Encounter: [id number]  Primary Care Provider: Issa Hull MD   Date and time admitted to hospital: 8/27/2024  3:23 PM    * SIRS (systemic inflammatory response syndrome) (HCC)  Assessment & Plan  SIRS criteria met is tachycardia and leukocytosis, he was hypotensive to the 70s systolic as well  Unclear source of infection.  Chest x-ray with no disease, urine with only minimal pyuria  CXR no findings of acute cardiopulmonary disease, pending official read  Initial lactic 1.5.  Received 30 cc/kg fluid for hypotension  Pro-Vitaliy of 0.07  Blood cultures x 2 pending  Consider possible bacteremia as he has several wounds from his numerous falls over the last several days    Plan  Patient did well off antibiotics.  No need on discharge   blood cultures are negative at 48 hours, remained afebrile throughout admission      Falls  Assessment & Plan  Presenting for daily mechanical falls at home over the past 3 days, pt on eliquis. Pt slipped on rug, lost balance while getting dressed, fell down 4 stairs today. Denies head strike. Associated dizziness, left leg pain, back pain that is chronic. Pt had episode of near syncope due to dizziness while pivoting at MRI, found to be hypotensive. Responded well to 2L IVF.   Pt not evaluated by trauma at the ED providers discretion.  No head strike, no prodromal chest pain, SOB, diaphoresis.  Multiple falls mechanical in nature (slipping on rug, losing balance while changing/ walking down stairs) suspect falls are multifactorial in etiology with h/o hypotension, dizziness, ambulatory dysfunction 2/2 left leg injury and chronic low back pain.   CT head negative for acute intracranial abnormality, mild to moderate chronic small vessel ischemic changes  No new neurological deficits noted on physical exam, patient does have diabetic peripheral neuropathy and is  "decreased sensation to bilateral feet    Patient's orthostatic vitals are positive most likely result of multiple medications in combination     Plan  Orthostatic vital signs now negative on DC  PT/OT evaluation complete recommend level III; stable for discharge, home health referral placed  Cardiology consulted while inpatient.  Lisinopril dose reduced to 5 mg daily  Stopped Isordil further to 5 mg 3 times daily (Down from 20 mg TID)   Compression stockings ordered  Patient will follow-up with PCP and cardiologist (appointment already made).     Left leg pain  Assessment & Plan  Presenting with left posterior, constant \"muscle straining\" pain that extends from the thigh down to the calf.  Patient obtained this injury after slipping on the rug 3 days ago.  X-ray of left tibia and left knee unremarkable for traumatic injury  MRI lumbar spine noted with chronic degenerative changes/disc herniation  Continue supportive measures with Ace bandages, ice packs, as needed Tylenol  PT/OT evaluation completed recommend level III; home PT referral placed on discharge      History of coronary artery disease  Assessment & Plan  S/p CABG x 3 and PCI in 2022   Follows with [facility] cardiology    Initial trop 4   EKG unchanged from prior with NSR and known 1st degree AV block     Type 2 diabetes mellitus (HCC)  Assessment & Plan  Lab Results   Component Value Date    HGBA1C 6.1 (H) 08/27/2024       Recent Labs     08/28/24  1553 08/28/24  2057 08/29/24  0804 08/29/24  1034   POCGLU 147* 143* 129 133         Blood Sugar Average: Last 72 hrs:  (P) 133.25  Home regimen of empagliflozin-linagliptin, Lantus 28 units HS, metformin 1000mg BID   Placed on Lantus HS and place on sliding scale insulin w/ meals while inpatient  Home regimen continued at discharge    Hypothyroidism  Assessment & Plan  Continued levothyroxine 88mcg daily    Atrial fibrillation (HCC)  Assessment & Plan  Rate controled on metoprolol, anticoagulated on eliquis   EKG " shows NSR with 1st degree AV block similar to prior     Hyperlipidemia  Assessment & Plan  Continued lipitor and zetia     Hypertension  Assessment & Plan  Pt initially hypotensive on presentation, responded well to IVF   Will adjust home medications per cardiology recommendations  Patient will follow-up with his PCP and cardiologist (already has an appointment) after discharge for further medication optimization        Medical Problems       Resolved Problems  Date Reviewed: 8/28/2024   None       Discharging Resident: Terrance Sigala MD  Discharging Attending: Preeti Durham*  PCP: Issa Hull MD  Admission Date:   Admission Orders (From admission, onward)       Ordered        08/27/24 2100  INPATIENT ADMISSION  Once                          Discharge Date: 08/30/24    Consultations During Hospital Stay:  Cardiology    Procedures Performed:   None    Significant Findings / Test Results:   None    Incidental Findings:   None  N/A    Test Results Pending at Discharge (will require follow up):  None     Outpatient Tests Requested:  None    Complications:  None    Reason for Admission: [place] Hospital Course:   Pete Wetzel is a 74 y.o. male patient with a PMH of type 2 diabetes, hypothyroidism, hyperlipidemia, CAD s/p CABG x 3, A-fib on Eliquis, obesity who originally presented to the hospital on 8/27/2024 due to frequent falling over the last 3 days, mechanical in nature.  Patient also reported 1 episode of near syncope as he pivoted to the MRI table during evaluation in the ED.  During his hospital stay he was found to be hypotensive with positive orthostatic vital signs.  Patient taking several medications with a hypotensive side effect profile.  Cardiology was consulted.  They recommended cutting down his Isodril to 5 mg 3 times daily and his lisinopril down to 20 mg daily.  Patient improved and did well with PT/OT evaluations.  Home health referral was made to continue work on deconditioning.   "Patient's Isordil was completely discontinued and on discharge lisinopril was reduced again to 5 mg daily, and chlorthalidone and potassium were further discontinued.  [facility], already has an appointment for early September and PCP on discharge.  Was also counseled on wearing compression stockings and moving only when attempting to transition from lying to sitting to standing.    Please see above list of diagnoses and related plan for additional information.     Condition at Discharge: good    Discharge Day Visit / Exam:   Subjective: Patient seen resting in bed.  No acute overnight events.  Patient states that he feels a lot better and reports resolution of his symptoms such as dizziness/lightheadedness during transitions.  He reports no new symptomology.  Vitals: Blood Pressure: 121/58 (08/30/24 1136)  Pulse: 89 (08/30/24 1136)  Temperature: 98.5 °F (36.9 °C) (08/30/24 0728)  Temp Source: Oral (08/28/24 2053)  Respirations: 18 (08/29/24 2039)  Height: 5' 8\" (172.7 cm) (08/28/24 1210)  Weight - Scale: 104 kg (229 lb 4.5 oz) (08/28/24 1210)  SpO2: 95 % (08/30/24 1136)  Exam:   Physical Exam  Vitals reviewed.   Constitutional:       General: He is not in acute distress.     Appearance: He is not ill-appearing, toxic-appearing or diaphoretic.   HENT:      Head: Normocephalic and atraumatic.      Nose: No congestion or rhinorrhea.      Mouth/Throat:      Mouth: Mucous membranes are moist.      Pharynx: No oropharyngeal exudate or posterior oropharyngeal erythema.   Eyes:      General: No scleral icterus.        Right eye: No discharge.         Left eye: No discharge.      Extraocular Movements: Extraocular movements intact.      Conjunctiva/sclera: Conjunctivae normal.   Cardiovascular:      Rate and Rhythm: Normal rate and regular rhythm.      Pulses: Normal pulses.      Heart sounds: Normal heart sounds. No murmur heard.     No friction rub. No gallop.   Pulmonary:      " Effort: Pulmonary effort is normal. No respiratory distress.      Breath sounds: No stridor. No wheezing, rhonchi or rales.   Chest:      Chest wall: No tenderness.   Abdominal:      General: Abdomen is flat. Bowel sounds are normal. There is no distension.      Palpations: Abdomen is soft. There is no mass.      Tenderness: There is no abdominal tenderness. There is no guarding or rebound.      Hernia: No hernia is present.   Musculoskeletal:         General: Signs of injury present. No swelling.      Comments: Multiple bruising and injury points from previous falls, all well-healing   Skin:     General: Skin is warm.      Findings: Bruising present.   Neurological:      General: No focal deficit present.      Mental Status: He is alert and oriented to person, place, and time. Mental status is at baseline.   Psychiatric:         Mood and Affect: Mood normal.         Behavior: Behavior normal.         Thought Content: Thought content normal.         Judgment: Judgment normal.         Discussion with Family: Updated  (wife) at bedside.    Discharge instructions/Information to patient and family:   See after visit summary for information provided to patient and family.      Provisions for Follow-Up Care:  See after visit summary for information related to follow-up care and any pertinent home health orders.      Mobility at time of Discharge:   Basic Mobility Inpatient Raw Score: 18  JH-HLM Goal: 6: Walk 10 steps or more  JH-HLM Achieved: 6: Walk 10 steps or more  HLM Goal achieved. Continue to encourage appropriate mobility.     Disposition:   Home    Planned Readmission: No.    Discharge Medications:  See after visit summary for reconciled discharge medications provided to patient and/or family.      **Please Note: This note may have been constructed using a voice recognition system**

## 2024-08-30 NOTE — PROGRESS NOTES
Cardiology Progress Note - Pete MENDOZA Damari 74 y.o. male MRN: [id number]    Unit/Bed#: S -01 Encounter: [id number]      Assessment:  Principal Problem:    SIRS (systemic inflammatory response syndrome) (HCC)  Active Problems:    Hypertension    Hyperlipidemia    Atrial fibrillation (HCC)    Hypothyroidism    Type 2 diabetes mellitus (HCC)    History of coronary artery disease    Falls    Left leg pain      Plan:  Syncope  Patient presents to the ED after 1 episode of mechanical fall followed by a second fall while standing the living room without any prodromal syncopal event.  After the fall patient did not lose consciousness.  Denied having any seizure activity on confusion.  Patient did endorse having brief episode of passing out while he underwent MRI in the hospital  Otherwise patient denies having any chest pain, shortness of breath, palpitations or any other associated symptoms  Patient did endorse having history of orthostatic hypotension and is usually careful while getting up from a seated position  In the ED patient was noted to have orthostatic hypotension and received multiple IV fluid boluses  EKG showed sinus rhythm with no significant ST changes, first-degree AV block.  Troponins remain flat  EF 60%- noted to have grade 1 diastolic dysfunction.  No concerns for aortic stenosis  Upon admission patient was noted to meet SIRS criteria with unclear source of infection and was started on empiric antibiotics.  Patient is lisinopril and chlorthalidone was held upon admission  Patient is currently on Isordil 20 g 3 times daily, Lyrica 100 mg twice daily could be potentially contributing to the patient's orthostatic hypertension, along with given overall patient's diabetic neuropathy.  Syncope appears to be less likely of cardiac origin at this point  Consider switching Lyrica to possible duloxetine vs gabapentin with less orthostatic hypotension profile  8/30 patient's orthostatic vitals were checked this  "morning which were negative and patient remained asymptomatic throughout.  No significant events overnight on telemetry.   Patient's Isordil dose initially lowered and then was later discontinued by primary team given patient's continued hypotension.  At this point patient does not have any symptoms of angina can monitor off Isordil  Continue patient's lisinopril at a reduced dose of 5 mg.  Patient otherwise stable for discharge from cardiac standpoint.  Follow-up with outpatient primary care/cardiology to uptitrate his medications as needed.     2. Atrial fibrillation  Has remained in sinus rhythm  Continue metoprolol 50 twice daily and Eliquis 5 mg twice daily     3. CAD status post CABG x 3 and 5 stents with recent PCI in 2022   EKG unchanged from prior and troponins remain flat  Denies having any chest pain, shortness of breath     4. Hyperlipidemia continue Lipitor and Zetia     5. hypertension  Continuing metoprolol 50 mg twice daily  Continue patient's lisinopril reduced dose of 5 mg daily  Discontinue Isordil  Continue to hold chlorthalidone     6.  Hypothyroidism  Maintained on levothyroxine 88 mcg       Subjective:   Patient seen and examined.  No significant events overnight.  Patient comfortably laying in bed not in any acute distress.  Denies having any chest pain, shortness of breath or any dizziness upon ambulation.     Objective:     Vitals: Blood pressure 114/59, pulse 81, temperature 98.5 °F (36.9 °C), resp. rate 18, height 5' 8\" (1.727 m), weight 104 kg (229 lb 4.5 oz), SpO2 95%., Body mass index is 34.86 kg/m².,   Orthostatic Blood Pressures      Flowsheet Row Most Recent Value   Blood Pressure 114/59 filed at 08/30/2024 0728   Patient Position - Orthostatic VS Standing for 3 minutes - Orthostatic VS filed at 08/29/2024 1208              Intake/Output Summary (Last 24 hours) at 8/30/2024 1018  Last data filed at 8/30/2024 0932  Gross per 24 hour   Intake 480 ml   Output 1000 ml   Net -520 ml "       No significant arrhythmias seen on telemetry review.       Physical Exam:  Physical Exam  Vitals and nursing note reviewed.   Constitutional:       General: He is not in acute distress.     Appearance: He is well-developed.   HENT:      Head: Normocephalic and atraumatic.   Eyes:      Conjunctiva/sclera: Conjunctivae normal.   Cardiovascular:      Rate and Rhythm: Normal rate and regular rhythm.      Heart sounds: No murmur heard.  Pulmonary:      Effort: Pulmonary effort is normal. No respiratory distress.      Breath sounds: Normal breath sounds.   Abdominal:      Palpations: Abdomen is soft.      Tenderness: There is no abdominal tenderness.   Musculoskeletal:         General: No swelling.      Cervical back: Neck supple.   Skin:     General: Skin is warm and dry.      Capillary Refill: Capillary refill takes less than 2 seconds.      Findings: Bruising present.   Neurological:      Mental Status: He is alert.   Psychiatric:         Mood and Affect: Mood normal.          Medications:      Current Facility-Administered Medications:     acetaminophen (TYLENOL) tablet 650 mg, 650 mg, Oral, Q6H PRN, Shreya Elizabeth PA-C    apixaban (ELIQUIS) tablet 5 mg, 5 mg, Oral, BID, Shreya Elizabeth PA-C, 5 mg at 08/30/24 0926    atorvastatin (LIPITOR) tablet 80 mg, 80 mg, Oral, HS, Shreya Elizabeth PA-C, 80 mg at 08/29/24 2203    clonazePAM (KlonoPIN) tablet 0.5 mg, 0.5 mg, Oral, Daily, Shreya Elizabeth PA-C, 0.5 mg at 08/30/24 0926    clonazePAM (KlonoPIN) tablet 1 mg, 1 mg, Oral, HS, Shreya Elizabeth PA-C, 1 mg at 08/29/24 2203    clopidogrel (PLAVIX) tablet 75 mg, 75 mg, Oral, Daily, Shreya Elizabeth PA-C, 75 mg at 08/30/24 0926    ezetimibe (ZETIA) tablet 5 mg, 5 mg, Oral, Daily, Shreya Elizabeth PA-C, 5 mg at 08/30/24 0926    insulin glargine (LANTUS) subcutaneous injection 28 Units 0.28 mL, 28 Units, Subcutaneous, HS, Shreya Elizabeth PA-C, 28 Units at 08/29/24 2211    insulin lispro  (HumALOG/ADMELOG) 100 units/mL subcutaneous injection 1-5 Units, 1-5 Units, Subcutaneous, HS, Shreya Elizabeth PA-C    insulin lispro (HumALOG/ADMELOG) 100 units/mL subcutaneous injection 1-6 Units, 1-6 Units, Subcutaneous, TID AC **AND** Fingerstick Glucose (POCT), , , 4x Daily AC and at bedtime, Shreya Elizabeth PA-C    isosorbide dinitrate (ISORDIL) tablet 5 mg, 5 mg, Oral, TID, Rohit Holloway DO, 5 mg at 08/30/24 0926    levothyroxine tablet 88 mcg, 88 mcg, Oral, Early Morning, Shreya Elizabeth PA-C, 88 mcg at 08/30/24 0440    lisinopril (ZESTRIL) tablet 5 mg, 5 mg, Oral, Daily, Rohit Holloway DO, 5 mg at 08/30/24 0929    metoprolol tartrate (LOPRESSOR) tablet 50 mg, 50 mg, Oral, Q12H LIZBET, Shreya Elizabeth PA-C, 50 mg at 08/30/24 0926    polyethylene glycol (MIRALAX) packet 17 g, 17 g, Oral, Daily, Rohit Holloway DO, 17 g at 08/30/24 0926    pregabalin (LYRICA) capsule 100 mg, 100 mg, Oral, BID, Shreya Elizabeth PA-C, 100 mg at 08/30/24 0926    ranolazine (RANEXA) 12 hr tablet 500 mg, 500 mg, Oral, Q12H LIZBET, Shreya Elizabeth PA-C, 500 mg at 08/30/24 0927    senna-docusate sodium (SENOKOT S) 8.6-50 mg per tablet 1 tablet, 1 tablet, Oral, HS, Rohit Holloway DO, 1 tablet at 08/29/24 2203     Labs & Results:        Results from last 7 days   Lab Units 08/30/24  0439 08/29/24  0549 08/28/24  0515   WBC Thousand/uL 9.31 9.94 11.34*   HEMOGLOBIN g/dL 9.2* 9.8* 10.7*   HEMATOCRIT % 27.7* 29.5* 32.9*   PLATELETS Thousands/uL 183 189 189         Results from last 7 days   Lab Units 08/30/24  0439 08/29/24  0549 08/28/24  0515 08/27/24  1522   POTASSIUM mmol/L 3.5 3.6 4.1 4.2   CHLORIDE mmol/L 106 106 108 101   CO2 mmol/L 26 26 26 23   BUN mg/dL 17 20 24 26*   CREATININE mg/dL 0.91 1.00 1.15 1.29   CALCIUM mg/dL 7.9* 8.0* 8.0* 8.9   ALK PHOS U/L  --  65 66 80   ALT U/L  --  21 22 26   AST U/L  --  41* 44* 63*     Results from last 7 days   Lab Units 08/27/24  1522   INR   1.23*   PTT seconds 35*           Counseling / Coordination of Care  Total floor / unit time spent today  minutes.  Greater than 50% of total time was spent with the patient and / or family counseling and / or coordination of care.  A description of the counseling / coordination of care: .

## 2024-08-30 NOTE — QUICK NOTE
Checked orthostatic vitals on rounds. HR stayed in 90s throughout. Lying BP was 120s/60s. Sitting BP was 130s/60s. Standing BP was 140s/60s. He remained asymptomatic throughout.l

## 2024-08-31 ENCOUNTER — HOME CARE VISIT (OUTPATIENT)
Dept: HOME HEALTH SERVICES | Facility: HOME HEALTHCARE | Age: 74
End: 2024-08-31
Payer: MEDICARE

## 2024-08-31 PROCEDURE — 400013 VN SOC

## 2024-08-31 PROCEDURE — 10330081 VN NO-PAY CLAIM PROCEDURE

## 2024-08-31 PROCEDURE — G0299 HHS/HOSPICE OF RN EA 15 MIN: HCPCS

## 2024-08-31 RX ORDER — ONDANSETRON 4 MG/1
4 TABLET, FILM COATED ORAL EVERY 8 HOURS PRN
Qty: 10 TABLET | Refills: 0 | Status: SHIPPED | OUTPATIENT
Start: 2024-08-31

## 2024-09-01 VITALS
DIASTOLIC BLOOD PRESSURE: 74 MMHG | RESPIRATION RATE: 20 BRPM | HEART RATE: 90 BPM | SYSTOLIC BLOOD PRESSURE: 132 MMHG | OXYGEN SATURATION: 97 % | TEMPERATURE: 97.6 F

## 2024-09-01 LAB
BACTERIA BLD CULT: NORMAL
BACTERIA BLD CULT: NORMAL

## 2024-09-03 ENCOUNTER — HOME CARE VISIT (OUTPATIENT)
Dept: HOME HEALTH SERVICES | Facility: HOME HEALTHCARE | Age: 74
End: 2024-09-03
Payer: MEDICARE

## 2024-09-03 VITALS
HEART RATE: 72 BPM | DIASTOLIC BLOOD PRESSURE: 68 MMHG | TEMPERATURE: 97.6 F | SYSTOLIC BLOOD PRESSURE: 126 MMHG | OXYGEN SATURATION: 99 % | RESPIRATION RATE: 18 BRPM

## 2024-09-03 PROCEDURE — G0299 HHS/HOSPICE OF RN EA 15 MIN: HCPCS

## 2024-09-04 ENCOUNTER — HOME CARE VISIT (OUTPATIENT)
Dept: HOME HEALTH SERVICES | Facility: HOME HEALTHCARE | Age: 74
End: 2024-09-04
Payer: MEDICARE

## 2024-09-04 VITALS
OXYGEN SATURATION: 98 % | HEART RATE: 72 BPM | SYSTOLIC BLOOD PRESSURE: 116 MMHG | RESPIRATION RATE: 18 BRPM | DIASTOLIC BLOOD PRESSURE: 60 MMHG

## 2024-09-04 LAB
ATRIAL RATE: 90 BPM
P AXIS: 41 DEGREES
PR INTERVAL: 214 MS
QRS AXIS: 267 DEGREES
QRSD INTERVAL: 84 MS
QT INTERVAL: 366 MS
QTC INTERVAL: 447 MS
T WAVE AXIS: 95 DEGREES
VENTRICULAR RATE: 90 BPM

## 2024-09-04 PROCEDURE — G0151 HHCP-SERV OF PT,EA 15 MIN: HCPCS

## 2024-09-04 PROCEDURE — 93010 ELECTROCARDIOGRAM REPORT: CPT | Performed by: INTERNAL MEDICINE

## 2024-09-05 ENCOUNTER — HOME CARE VISIT (OUTPATIENT)
Dept: HOME HEALTH SERVICES | Facility: HOME HEALTHCARE | Age: 74
End: 2024-09-05
Payer: MEDICARE

## 2024-09-05 VITALS — OXYGEN SATURATION: 97 % | SYSTOLIC BLOOD PRESSURE: 114 MMHG | HEART RATE: 67 BPM | DIASTOLIC BLOOD PRESSURE: 68 MMHG

## 2024-09-05 LAB
DME PARACHUTE DELIVERY DATE REQUESTED: NORMAL
DME PARACHUTE ITEM DESCRIPTION: NORMAL
DME PARACHUTE ORDER STATUS: NORMAL
DME PARACHUTE SUPPLIER NAME: NORMAL
DME PARACHUTE SUPPLIER PHONE: NORMAL

## 2024-09-05 PROCEDURE — G0152 HHCP-SERV OF OT,EA 15 MIN: HCPCS

## 2024-09-06 ENCOUNTER — HOME CARE VISIT (OUTPATIENT)
Dept: HOME HEALTH SERVICES | Facility: HOME HEALTHCARE | Age: 74
End: 2024-09-06
Payer: MEDICARE

## 2024-09-06 VITALS
RESPIRATION RATE: 18 BRPM | HEART RATE: 72 BPM | OXYGEN SATURATION: 97 % | TEMPERATURE: 97.6 F | DIASTOLIC BLOOD PRESSURE: 72 MMHG | SYSTOLIC BLOOD PRESSURE: 122 MMHG

## 2024-09-06 PROCEDURE — G0299 HHS/HOSPICE OF RN EA 15 MIN: HCPCS

## 2024-09-09 ENCOUNTER — HOME CARE VISIT (OUTPATIENT)
Dept: HOME HEALTH SERVICES | Facility: HOME HEALTHCARE | Age: 74
End: 2024-09-09
Payer: MEDICARE

## 2024-09-09 VITALS
SYSTOLIC BLOOD PRESSURE: 134 MMHG | RESPIRATION RATE: 26 BRPM | DIASTOLIC BLOOD PRESSURE: 74 MMHG | OXYGEN SATURATION: 98 % | HEART RATE: 76 BPM

## 2024-09-09 PROCEDURE — G0299 HHS/HOSPICE OF RN EA 15 MIN: HCPCS

## 2024-09-09 PROCEDURE — G0151 HHCP-SERV OF PT,EA 15 MIN: HCPCS

## 2024-09-10 ENCOUNTER — HOME CARE VISIT (OUTPATIENT)
Dept: HOME HEALTH SERVICES | Facility: HOME HEALTHCARE | Age: 74
End: 2024-09-10
Payer: MEDICARE

## 2024-09-10 VITALS — DIASTOLIC BLOOD PRESSURE: 78 MMHG | OXYGEN SATURATION: 96 % | SYSTOLIC BLOOD PRESSURE: 121 MMHG | HEART RATE: 63 BPM

## 2024-09-10 VITALS
DIASTOLIC BLOOD PRESSURE: 70 MMHG | TEMPERATURE: 97.6 F | RESPIRATION RATE: 18 BRPM | OXYGEN SATURATION: 98 % | HEART RATE: 72 BPM | SYSTOLIC BLOOD PRESSURE: 122 MMHG

## 2024-09-10 PROCEDURE — G0152 HHCP-SERV OF OT,EA 15 MIN: HCPCS

## 2024-09-11 ENCOUNTER — HOME CARE VISIT (OUTPATIENT)
Dept: HOME HEALTH SERVICES | Facility: HOME HEALTHCARE | Age: 74
End: 2024-09-11
Payer: MEDICARE

## 2024-09-11 VITALS
HEART RATE: 72 BPM | OXYGEN SATURATION: 98 % | DIASTOLIC BLOOD PRESSURE: 72 MMHG | SYSTOLIC BLOOD PRESSURE: 122 MMHG | RESPIRATION RATE: 20 BRPM

## 2024-09-11 PROCEDURE — G0180 MD CERTIFICATION HHA PATIENT: HCPCS | Performed by: INTERNAL MEDICINE

## 2024-09-11 PROCEDURE — G0151 HHCP-SERV OF PT,EA 15 MIN: HCPCS

## 2024-09-12 ENCOUNTER — HOME CARE VISIT (OUTPATIENT)
Dept: HOME HEALTH SERVICES | Facility: HOME HEALTHCARE | Age: 74
End: 2024-09-12
Payer: MEDICARE

## 2024-09-12 VITALS
OXYGEN SATURATION: 99 % | DIASTOLIC BLOOD PRESSURE: 74 MMHG | SYSTOLIC BLOOD PRESSURE: 122 MMHG | TEMPERATURE: 97.6 F | RESPIRATION RATE: 18 BRPM | HEART RATE: 72 BPM

## 2024-09-12 PROCEDURE — G0299 HHS/HOSPICE OF RN EA 15 MIN: HCPCS

## 2024-09-13 ENCOUNTER — HOME CARE VISIT (OUTPATIENT)
Dept: HOME HEALTH SERVICES | Facility: HOME HEALTHCARE | Age: 74
End: 2024-09-13
Payer: MEDICARE

## 2024-09-13 VITALS — SYSTOLIC BLOOD PRESSURE: 118 MMHG | OXYGEN SATURATION: 98 % | DIASTOLIC BLOOD PRESSURE: 72 MMHG | HEART RATE: 60 BPM

## 2024-09-13 PROCEDURE — G0152 HHCP-SERV OF OT,EA 15 MIN: HCPCS

## 2024-09-16 ENCOUNTER — HOME CARE VISIT (OUTPATIENT)
Dept: HOME HEALTH SERVICES | Facility: HOME HEALTHCARE | Age: 74
End: 2024-09-16
Payer: MEDICARE

## 2024-09-16 VITALS
RESPIRATION RATE: 18 BRPM | SYSTOLIC BLOOD PRESSURE: 122 MMHG | HEART RATE: 72 BPM | OXYGEN SATURATION: 97 % | DIASTOLIC BLOOD PRESSURE: 68 MMHG | TEMPERATURE: 97.6 F

## 2024-09-16 PROCEDURE — G0152 HHCP-SERV OF OT,EA 15 MIN: HCPCS

## 2024-09-16 PROCEDURE — G0299 HHS/HOSPICE OF RN EA 15 MIN: HCPCS

## 2024-09-17 VITALS — DIASTOLIC BLOOD PRESSURE: 83 MMHG | HEART RATE: 81 BPM | OXYGEN SATURATION: 99 % | SYSTOLIC BLOOD PRESSURE: 140 MMHG

## 2024-09-18 ENCOUNTER — HOME CARE VISIT (OUTPATIENT)
Dept: HOME HEALTH SERVICES | Facility: HOME HEALTHCARE | Age: 74
End: 2024-09-18
Payer: MEDICARE

## 2024-09-18 VITALS
SYSTOLIC BLOOD PRESSURE: 108 MMHG | DIASTOLIC BLOOD PRESSURE: 64 MMHG | HEART RATE: 72 BPM | OXYGEN SATURATION: 96 % | RESPIRATION RATE: 20 BRPM

## 2024-09-18 LAB
DME PARACHUTE DELIVERY DATE ACTUAL: NORMAL
DME PARACHUTE DELIVERY DATE REQUESTED: NORMAL
DME PARACHUTE ITEM DESCRIPTION: NORMAL
DME PARACHUTE ORDER STATUS: NORMAL
DME PARACHUTE SUPPLIER NAME: NORMAL
DME PARACHUTE SUPPLIER PHONE: NORMAL

## 2024-09-18 PROCEDURE — G0151 HHCP-SERV OF PT,EA 15 MIN: HCPCS

## 2024-09-19 ENCOUNTER — HOME CARE VISIT (OUTPATIENT)
Dept: HOME HEALTH SERVICES | Facility: HOME HEALTHCARE | Age: 74
End: 2024-09-19
Payer: MEDICARE

## 2024-09-19 VITALS
DIASTOLIC BLOOD PRESSURE: 68 MMHG | SYSTOLIC BLOOD PRESSURE: 116 MMHG | OXYGEN SATURATION: 98 % | HEART RATE: 72 BPM | TEMPERATURE: 96.7 F | RESPIRATION RATE: 18 BRPM

## 2024-09-19 PROCEDURE — G0299 HHS/HOSPICE OF RN EA 15 MIN: HCPCS

## 2024-09-20 ENCOUNTER — HOME CARE VISIT (OUTPATIENT)
Dept: HOME HEALTH SERVICES | Facility: HOME HEALTHCARE | Age: 74
End: 2024-09-20
Payer: MEDICARE

## 2024-09-20 VITALS
RESPIRATION RATE: 18 BRPM | SYSTOLIC BLOOD PRESSURE: 122 MMHG | OXYGEN SATURATION: 98 % | DIASTOLIC BLOOD PRESSURE: 74 MMHG | HEART RATE: 72 BPM

## 2024-09-20 PROCEDURE — G0152 HHCP-SERV OF OT,EA 15 MIN: HCPCS

## 2024-09-20 PROCEDURE — G0151 HHCP-SERV OF PT,EA 15 MIN: HCPCS

## 2024-09-23 ENCOUNTER — HOME CARE VISIT (OUTPATIENT)
Dept: HOME HEALTH SERVICES | Facility: HOME HEALTHCARE | Age: 74
End: 2024-09-23
Payer: MEDICARE

## 2024-09-23 VITALS
SYSTOLIC BLOOD PRESSURE: 106 MMHG | OXYGEN SATURATION: 97 % | TEMPERATURE: 97.6 F | DIASTOLIC BLOOD PRESSURE: 62 MMHG | RESPIRATION RATE: 18 BRPM | HEART RATE: 62 BPM

## 2024-09-23 VITALS — HEART RATE: 79 BPM | OXYGEN SATURATION: 97 % | SYSTOLIC BLOOD PRESSURE: 120 MMHG | DIASTOLIC BLOOD PRESSURE: 82 MMHG

## 2024-09-23 VITALS
SYSTOLIC BLOOD PRESSURE: 132 MMHG | OXYGEN SATURATION: 99 % | DIASTOLIC BLOOD PRESSURE: 78 MMHG | RESPIRATION RATE: 20 BRPM | HEART RATE: 68 BPM

## 2024-09-23 PROCEDURE — G0299 HHS/HOSPICE OF RN EA 15 MIN: HCPCS

## 2024-09-23 PROCEDURE — G0151 HHCP-SERV OF PT,EA 15 MIN: HCPCS

## 2025-04-05 NOTE — ED NOTES
Provider at bedside       Rosalina Ramirez RN  09/28/22 6196 no pain, swelling or deformity of joints

## (undated) DEVICE — DGW .035 FC J3MM 150CM TEF: Brand: EMERALD

## (undated) DEVICE — PINNACLE INTRODUCER SHEATH: Brand: PINNACLE

## (undated) DEVICE — GUIDEWIRE WHOLEY HI TORQUE INTERM MOD J .035 145CM

## (undated) DEVICE — RADIFOCUS GLIDECATH: Brand: GLIDECATH

## (undated) DEVICE — MICROPUNCTURE INTRODUCER SET SILHOUETTE TRANSITIONLESS PUSH-PLUS DESIGN - STIFFENED CANNULA WITH STAINLESS STEEL WIRE GUIDE: Brand: MICROPUNCTURE

## (undated) DEVICE — CATH DIAG 5FR IMPULSE 100CM LCB

## (undated) DEVICE — CATH DIAG 5FR IMPULSE 100CM FR4

## (undated) DEVICE — Device